# Patient Record
Sex: MALE | Race: WHITE | Employment: OTHER | URBAN - METROPOLITAN AREA
[De-identification: names, ages, dates, MRNs, and addresses within clinical notes are randomized per-mention and may not be internally consistent; named-entity substitution may affect disease eponyms.]

---

## 2019-03-27 ENCOUNTER — HOSPITAL ENCOUNTER (OUTPATIENT)
Dept: SURGERY | Age: 54
Discharge: HOME | End: 2019-03-27
Payer: MEDICARE

## 2019-03-27 ENCOUNTER — ANESTHESIA EVENT (OUTPATIENT)
Dept: SURGERY | Age: 54
DRG: 483 | End: 2019-03-27
Payer: MEDICARE

## 2019-03-27 VITALS
HEART RATE: 88 BPM | BODY MASS INDEX: 29.3 KG/M2 | OXYGEN SATURATION: 95 % | SYSTOLIC BLOOD PRESSURE: 107 MMHG | HEIGHT: 66 IN | WEIGHT: 182.31 LBS | RESPIRATION RATE: 18 BRPM | DIASTOLIC BLOOD PRESSURE: 69 MMHG | TEMPERATURE: 97.6 F

## 2019-03-27 LAB
ANION GAP SERPL CALC-SCNC: 6 MMOL/L (ref 7–16)
BACTERIA SPEC CULT: NORMAL
BUN SERPL-MCNC: 16 MG/DL (ref 6–23)
CALCIUM SERPL-MCNC: 8.5 MG/DL (ref 8.3–10.4)
CHLORIDE SERPL-SCNC: 106 MMOL/L (ref 98–107)
CO2 SERPL-SCNC: 29 MMOL/L (ref 21–32)
CREAT SERPL-MCNC: 0.97 MG/DL (ref 0.8–1.5)
ERYTHROCYTE [DISTWIDTH] IN BLOOD BY AUTOMATED COUNT: 13.4 % (ref 11.9–14.6)
GLUCOSE SERPL-MCNC: 150 MG/DL (ref 65–100)
HCT VFR BLD AUTO: 48.9 % (ref 41.1–50.3)
HGB BLD-MCNC: 16 G/DL (ref 13.6–17.2)
MCH RBC QN AUTO: 31.4 PG (ref 26.1–32.9)
MCHC RBC AUTO-ENTMCNC: 32.7 G/DL (ref 31.4–35)
MCV RBC AUTO: 96.1 FL (ref 79.6–97.8)
NRBC # BLD: 0 K/UL (ref 0–0.2)
PLATELET # BLD AUTO: 229 K/UL (ref 150–450)
PMV BLD AUTO: 10 FL (ref 9.4–12.3)
POTASSIUM SERPL-SCNC: 3.8 MMOL/L (ref 3.5–5.1)
RBC # BLD AUTO: 5.09 M/UL (ref 4.23–5.6)
SERVICE CMNT-IMP: NORMAL
SODIUM SERPL-SCNC: 141 MMOL/L (ref 136–145)
WBC # BLD AUTO: 4.9 K/UL (ref 4.3–11.1)

## 2019-03-27 PROCEDURE — 85027 COMPLETE CBC AUTOMATED: CPT

## 2019-03-27 PROCEDURE — 87641 MR-STAPH DNA AMP PROBE: CPT

## 2019-03-27 PROCEDURE — 80048 BASIC METABOLIC PNL TOTAL CA: CPT

## 2019-03-27 RX ORDER — CETIRIZINE HCL 10 MG
10 TABLET ORAL
COMMUNITY

## 2019-03-27 RX ORDER — LANOLIN ALCOHOL/MO/W.PET/CERES
CREAM (GRAM) TOPICAL
COMMUNITY

## 2019-03-27 RX ORDER — ERGOCALCIFEROL 1.25 MG/1
50000 CAPSULE ORAL
COMMUNITY

## 2019-03-27 NOTE — PERIOP NOTES
Lab results reviewed and acceptable per anesthesia protocol Recent Results (from the past 12 hour(s)) CBC W/O DIFF Collection Time: 03/27/19  2:49 PM  
Result Value Ref Range WBC 4.9 4.3 - 11.1 K/uL  
 RBC 5.09 4.23 - 5.6 M/uL  
 HGB 16.0 13.6 - 17.2 g/dL HCT 48.9 41.1 - 50.3 % MCV 96.1 79.6 - 97.8 FL  
 MCH 31.4 26.1 - 32.9 PG  
 MCHC 32.7 31.4 - 35.0 g/dL  
 RDW 13.4 11.9 - 14.6 % PLATELET 488 164 - 869 K/uL MPV 10.0 9.4 - 12.3 FL ABSOLUTE NRBC 0.00 0.0 - 0.2 K/uL METABOLIC PANEL, BASIC Collection Time: 03/27/19  2:49 PM  
Result Value Ref Range Sodium 141 136 - 145 mmol/L Potassium 3.8 3.5 - 5.1 mmol/L Chloride 106 98 - 107 mmol/L  
 CO2 29 21 - 32 mmol/L Anion gap 6 (L) 7 - 16 mmol/L Glucose 150 (H) 65 - 100 mg/dL BUN 16 6 - 23 MG/DL Creatinine 0.97 0.8 - 1.5 MG/DL  
 GFR est AA >60 >60 ml/min/1.73m2 GFR est non-AA >60 >60 ml/min/1.73m2 Calcium 8.5 8.3 - 10.4 MG/DL  
MSSA/MRSA SC BY PCR, NASAL SWAB Collection Time: 03/27/19  2:49 PM  
Result Value Ref Range Special Requests: NO SPECIAL REQUESTS Culture result:     
  SA target not detected. A MRSA NEGATIVE, SA NEGATIVE test result does not preclude MRSA or SA nasal colonization.

## 2019-03-27 NOTE — PERIOP NOTES
Patient verified name and . Patient provided medical/health information and PTA medications to the best of their ability. TYPE  CASE:lll Order for consent NOT found in EHR and unable to match case posting. Labs per surgeon:none. Labs per anesthesia protocol: CBC, BMP, MRSA, T&S on DOS. EKG  :  10/5/2018 SR with Rt BBB, Has 1 stent placed in , Last heart cath in  no intervention. Cardiac clearance received from Dr. Rima Concepcion on 3/18/2019. Called and spoke with Dr. Garfield Marcos to inform of patient hx and surgery, no need to see patient today. Patient provided with and instructed on education handouts including Guide to Surgery, blood transfusions, pain management, and hand hygiene for the family and community, and [facility] brochure. Antibacterial soap and Hibiclens instructions given per hospital policy. Instructed patient to continue previous medications as prescribed prior to surgery unless otherwise directed and to take the following medications the day of surgery according to anesthesia guidelines : ASA 81mg, Coreg, Breo inhaler, Levothyroxine, Hydrocodone if needed, Protonix, Desvenlafaxine . Instructed patient to hold  the following medications: Celebrex, Vitamins, CBD oil with THC until after surgery. Original medication prescription bottles were visualized during patient appointment. Patient teach back successful and patient demonstrates knowledge of instruction.

## 2019-03-28 NOTE — PERIOP NOTES
Received phone call from patient. He states he does not have a legal healthcare power of  or living will, but does not want his wife (judy [address]) to be designated. Instructed needs to get legal statement, but will document in this note. States he prefers aunt Krishna Leon ([phone]) or Elayne Garvey ([phone]).

## 2019-03-31 PROBLEM — M25.511 RIGHT SHOULDER PAIN: Status: ACTIVE | Noted: 2019-03-31

## 2019-03-31 RX ORDER — CEFAZOLIN SODIUM/WATER 2 G/20 ML
2 SYRINGE (ML) INTRAVENOUS ONCE
Status: CANCELLED | OUTPATIENT
Start: 2019-04-01 | End: 2019-04-01
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
## 2019-04-01 ENCOUNTER — HOSPITAL ENCOUNTER (INPATIENT)
Age: 54
LOS: 2 days | Discharge: HOME HEALTH | DRG: 483 | End: 2019-04-03
Attending: ORTHOPAEDIC SURGERY | Admitting: ORTHOPAEDIC SURGERY
Payer: MEDICARE

## 2019-04-01 ENCOUNTER — APPOINTMENT (OUTPATIENT)
Dept: GENERAL RADIOLOGY | Age: 54
DRG: 483 | End: 2019-04-01
Attending: ORTHOPAEDIC SURGERY
Payer: MEDICARE

## 2019-04-01 ENCOUNTER — ANESTHESIA (OUTPATIENT)
Dept: SURGERY | Age: 54
DRG: 483 | End: 2019-04-01
Payer: MEDICARE

## 2019-04-01 PROBLEM — M87.00 AVASCULAR NECROSIS (HCC): Status: ACTIVE | Noted: 2019-04-01

## 2019-04-01 LAB
ABO + RH BLD: NORMAL
BLOOD GROUP ANTIBODIES SERPL: NORMAL
BLOOD GROUP ANTIBODIES SERPL: NORMAL
SPECIMEN EXP DATE BLD: NORMAL

## 2019-04-01 PROCEDURE — 94760 N-INVAS EAR/PLS OXIMETRY 1: CPT

## 2019-04-01 PROCEDURE — 76010010054 HC POST OP PAIN BLOCK: Performed by: ORTHOPAEDIC SURGERY

## 2019-04-01 PROCEDURE — 77030031139 HC SUT VCRL2 J&J -A: Performed by: ORTHOPAEDIC SURGERY

## 2019-04-01 PROCEDURE — 86870 RBC ANTIBODY IDENTIFICATION: CPT

## 2019-04-01 PROCEDURE — C1776 JOINT DEVICE (IMPLANTABLE): HCPCS | Performed by: ORTHOPAEDIC SURGERY

## 2019-04-01 PROCEDURE — 77030032490 HC SLV COMPR SCD KNE COVD -B: Performed by: ORTHOPAEDIC SURGERY

## 2019-04-01 PROCEDURE — 65270000029 HC RM PRIVATE

## 2019-04-01 PROCEDURE — 77030037088 HC TUBE ENDOTRACH ORAL NSL COVD-A: Performed by: ANESTHESIOLOGY

## 2019-04-01 PROCEDURE — 77030020255 HC SOL INJ LR 1000ML BG

## 2019-04-01 PROCEDURE — 77030008467 HC STPLR SKN COVD -B: Performed by: ORTHOPAEDIC SURGERY

## 2019-04-01 PROCEDURE — 74011250636 HC RX REV CODE- 250/636

## 2019-04-01 PROCEDURE — 74011000250 HC RX REV CODE- 250

## 2019-04-01 PROCEDURE — 77030027138 HC INCENT SPIROMETER -A

## 2019-04-01 PROCEDURE — 77030002922 HC SUT FBRWRE ARTH -B: Performed by: ORTHOPAEDIC SURGERY

## 2019-04-01 PROCEDURE — 76210000006 HC OR PH I REC 0.5 TO 1 HR: Performed by: ORTHOPAEDIC SURGERY

## 2019-04-01 PROCEDURE — 77030018836 HC SOL IRR NACL ICUM -A: Performed by: ORTHOPAEDIC SURGERY

## 2019-04-01 PROCEDURE — 77030012894: Performed by: ORTHOPAEDIC SURGERY

## 2019-04-01 PROCEDURE — 77030039425 HC BLD LARYNG TRULITE DISP TELE -A: Performed by: ANESTHESIOLOGY

## 2019-04-01 PROCEDURE — 73030 X-RAY EXAM OF SHOULDER: CPT

## 2019-04-01 PROCEDURE — 74011250636 HC RX REV CODE- 250/636: Performed by: ORTHOPAEDIC SURGERY

## 2019-04-01 PROCEDURE — 76010000171 HC OR TIME 2 TO 2.5 HR INTENSV-TIER 1: Performed by: ORTHOPAEDIC SURGERY

## 2019-04-01 PROCEDURE — 77030018673: Performed by: ORTHOPAEDIC SURGERY

## 2019-04-01 PROCEDURE — 77030003602 HC NDL NRV BLK BBMI -B: Performed by: ANESTHESIOLOGY

## 2019-04-01 PROCEDURE — 74011250636 HC RX REV CODE- 250/636: Performed by: ANESTHESIOLOGY

## 2019-04-01 PROCEDURE — 77030016570 HC BLNKT BAIR HGGR 3M -B: Performed by: ANESTHESIOLOGY

## 2019-04-01 PROCEDURE — 77030006835 HC BLD SAW SAG STRY -B: Performed by: ORTHOPAEDIC SURGERY

## 2019-04-01 PROCEDURE — 77030002982 HC SUT POLYSRB J&J -A: Performed by: ORTHOPAEDIC SURGERY

## 2019-04-01 PROCEDURE — 76942 ECHO GUIDE FOR BIOPSY: CPT | Performed by: ORTHOPAEDIC SURGERY

## 2019-04-01 PROCEDURE — 86900 BLOOD TYPING SEROLOGIC ABO: CPT

## 2019-04-01 PROCEDURE — L3650 SO 8 ABD RESTRAINT PRE OTS: HCPCS | Performed by: ORTHOPAEDIC SURGERY

## 2019-04-01 PROCEDURE — 74011250637 HC RX REV CODE- 250/637: Performed by: ORTHOPAEDIC SURGERY

## 2019-04-01 PROCEDURE — 77030013727 HC IRR FAN PULSVC ZIMM -B: Performed by: ORTHOPAEDIC SURGERY

## 2019-04-01 PROCEDURE — 76060000035 HC ANESTHESIA 2 TO 2.5 HR: Performed by: ORTHOPAEDIC SURGERY

## 2019-04-01 DEVICE — IMPLANTABLE DEVICE: Type: IMPLANTABLE DEVICE | Site: SHOULDER | Status: FUNCTIONAL

## 2019-04-01 DEVICE — STEM HUM DIA15MM SHLDR PRI PRESSFIT EQUINOXE: Type: IMPLANTABLE DEVICE | Site: SHOULDER | Status: FUNCTIONAL

## 2019-04-01 DEVICE — HEAD HUM DIA47MM SHT SHLDR FOR HEMIARTHROPLASTY EQUINOXE: Type: IMPLANTABLE DEVICE | Site: SHOULDER | Status: FUNCTIONAL

## 2019-04-01 RX ORDER — DICYCLOMINE HYDROCHLORIDE 10 MG/1
10 CAPSULE ORAL 2 TIMES DAILY
Status: DISCONTINUED | OUTPATIENT
Start: 2019-04-01 | End: 2019-04-03 | Stop reason: HOSPADM

## 2019-04-01 RX ORDER — MIDAZOLAM HYDROCHLORIDE 1 MG/ML
2 INJECTION, SOLUTION INTRAMUSCULAR; INTRAVENOUS
Status: COMPLETED | OUTPATIENT
Start: 2019-04-01 | End: 2019-04-01

## 2019-04-01 RX ORDER — PANTOPRAZOLE SODIUM 40 MG/1
40 TABLET, DELAYED RELEASE ORAL
Status: DISCONTINUED | OUTPATIENT
Start: 2019-04-01 | End: 2019-04-03 | Stop reason: HOSPADM

## 2019-04-01 RX ORDER — LIDOCAINE HYDROCHLORIDE 20 MG/ML
INJECTION, SOLUTION EPIDURAL; INFILTRATION; INTRACAUDAL; PERINEURAL AS NEEDED
Status: DISCONTINUED | OUTPATIENT
Start: 2019-04-01 | End: 2019-04-01 | Stop reason: HOSPADM

## 2019-04-01 RX ORDER — CEFAZOLIN SODIUM/WATER 2 G/20 ML
2 SYRINGE (ML) INTRAVENOUS
Status: COMPLETED | OUTPATIENT
Start: 2019-04-01 | End: 2019-04-01

## 2019-04-01 RX ORDER — ROCURONIUM BROMIDE 10 MG/ML
INJECTION, SOLUTION INTRAVENOUS AS NEEDED
Status: DISCONTINUED | OUTPATIENT
Start: 2019-04-01 | End: 2019-04-01 | Stop reason: HOSPADM

## 2019-04-01 RX ORDER — PANTOPRAZOLE SODIUM 40 MG/1
40 TABLET, DELAYED RELEASE ORAL 2 TIMES DAILY
Status: DISCONTINUED | OUTPATIENT
Start: 2019-04-01 | End: 2019-04-01

## 2019-04-01 RX ORDER — ALPRAZOLAM 0.5 MG/1
2 TABLET ORAL
Status: DISCONTINUED | OUTPATIENT
Start: 2019-04-01 | End: 2019-04-01

## 2019-04-01 RX ORDER — LEVOTHYROXINE SODIUM 50 UG/1
50 TABLET ORAL
Status: DISCONTINUED | OUTPATIENT
Start: 2019-04-02 | End: 2019-04-01

## 2019-04-01 RX ORDER — LEVALBUTEROL TARTRATE 45 UG/1
2 AEROSOL, METERED ORAL
Status: DISCONTINUED | OUTPATIENT
Start: 2019-04-01 | End: 2019-04-03 | Stop reason: HOSPADM

## 2019-04-01 RX ORDER — CELECOXIB 200 MG/1
200 CAPSULE ORAL DAILY
Status: DISCONTINUED | OUTPATIENT
Start: 2019-04-02 | End: 2019-04-03 | Stop reason: HOSPADM

## 2019-04-01 RX ORDER — LISINOPRIL 5 MG/1
5 TABLET ORAL DAILY
Status: DISCONTINUED | OUTPATIENT
Start: 2019-04-02 | End: 2019-04-03 | Stop reason: HOSPADM

## 2019-04-01 RX ORDER — HYDROCODONE BITARTRATE AND ACETAMINOPHEN 10; 325 MG/1; MG/1
1 TABLET ORAL
Status: DISCONTINUED | OUTPATIENT
Start: 2019-04-01 | End: 2019-04-02

## 2019-04-01 RX ORDER — SODIUM CHLORIDE 0.9 % (FLUSH) 0.9 %
5-40 SYRINGE (ML) INJECTION EVERY 8 HOURS
Status: DISCONTINUED | OUTPATIENT
Start: 2019-04-01 | End: 2019-04-03 | Stop reason: HOSPADM

## 2019-04-01 RX ORDER — IPRATROPIUM BROMIDE AND ALBUTEROL SULFATE 2.5; .5 MG/3ML; MG/3ML
3 SOLUTION RESPIRATORY (INHALATION)
Status: DISCONTINUED | OUTPATIENT
Start: 2019-04-01 | End: 2019-04-03 | Stop reason: HOSPADM

## 2019-04-01 RX ORDER — ENOXAPARIN SODIUM 100 MG/ML
40 INJECTION SUBCUTANEOUS EVERY 24 HOURS
Status: DISCONTINUED | OUTPATIENT
Start: 2019-04-02 | End: 2019-04-03 | Stop reason: HOSPADM

## 2019-04-01 RX ORDER — HYDROMORPHONE HYDROCHLORIDE 2 MG/ML
0.5 INJECTION, SOLUTION INTRAMUSCULAR; INTRAVENOUS; SUBCUTANEOUS
Status: DISCONTINUED | OUTPATIENT
Start: 2019-04-01 | End: 2019-04-01 | Stop reason: HOSPADM

## 2019-04-01 RX ORDER — GLYCOPYRROLATE 0.2 MG/ML
INJECTION INTRAMUSCULAR; INTRAVENOUS AS NEEDED
Status: DISCONTINUED | OUTPATIENT
Start: 2019-04-01 | End: 2019-04-01 | Stop reason: HOSPADM

## 2019-04-01 RX ORDER — CELECOXIB 200 MG/1
200 CAPSULE ORAL DAILY
Status: DISCONTINUED | OUTPATIENT
Start: 2019-04-02 | End: 2019-04-01

## 2019-04-01 RX ORDER — DEXAMETHASONE SODIUM PHOSPHATE 4 MG/ML
INJECTION, SOLUTION INTRA-ARTICULAR; INTRALESIONAL; INTRAMUSCULAR; INTRAVENOUS; SOFT TISSUE AS NEEDED
Status: DISCONTINUED | OUTPATIENT
Start: 2019-04-01 | End: 2019-04-01 | Stop reason: HOSPADM

## 2019-04-01 RX ORDER — SODIUM CHLORIDE 0.9 % (FLUSH) 0.9 %
5-40 SYRINGE (ML) INJECTION AS NEEDED
Status: DISCONTINUED | OUTPATIENT
Start: 2019-04-01 | End: 2019-04-03 | Stop reason: HOSPADM

## 2019-04-01 RX ORDER — BUPIVACAINE HYDROCHLORIDE 5 MG/ML
INJECTION, SOLUTION EPIDURAL; INTRACAUDAL
Status: COMPLETED | OUTPATIENT
Start: 2019-04-01 | End: 2019-04-01

## 2019-04-01 RX ORDER — NITROGLYCERIN 0.4 MG/1
0.4 TABLET SUBLINGUAL AS NEEDED
Status: DISCONTINUED | OUTPATIENT
Start: 2019-04-01 | End: 2019-04-03 | Stop reason: HOSPADM

## 2019-04-01 RX ORDER — SODIUM CHLORIDE 0.9 % (FLUSH) 0.9 %
5-40 SYRINGE (ML) INJECTION AS NEEDED
Status: DISCONTINUED | OUTPATIENT
Start: 2019-04-01 | End: 2019-04-01 | Stop reason: HOSPADM

## 2019-04-01 RX ORDER — PROPOFOL 10 MG/ML
INJECTION, EMULSION INTRAVENOUS AS NEEDED
Status: DISCONTINUED | OUTPATIENT
Start: 2019-04-01 | End: 2019-04-01 | Stop reason: HOSPADM

## 2019-04-01 RX ORDER — BUPIVACAINE HYDROCHLORIDE AND EPINEPHRINE 2.5; 5 MG/ML; UG/ML
INJECTION, SOLUTION EPIDURAL; INFILTRATION; INTRACAUDAL; PERINEURAL
Status: COMPLETED | OUTPATIENT
Start: 2019-04-01 | End: 2019-04-01

## 2019-04-01 RX ORDER — ASPIRIN 81 MG/1
81 TABLET ORAL DAILY
Status: DISCONTINUED | OUTPATIENT
Start: 2019-04-02 | End: 2019-04-03 | Stop reason: HOSPADM

## 2019-04-01 RX ORDER — FLUTICASONE PROPIONATE 50 MCG
2 SPRAY, SUSPENSION (ML) NASAL DAILY
Status: DISCONTINUED | OUTPATIENT
Start: 2019-04-02 | End: 2019-04-03 | Stop reason: HOSPADM

## 2019-04-01 RX ORDER — VANCOMYCIN HYDROCHLORIDE
1250 ONCE
Status: COMPLETED | OUTPATIENT
Start: 2019-04-01 | End: 2019-04-01

## 2019-04-01 RX ORDER — LIDOCAINE HYDROCHLORIDE 10 MG/ML
0.1 INJECTION INFILTRATION; PERINEURAL AS NEEDED
Status: DISCONTINUED | OUTPATIENT
Start: 2019-04-01 | End: 2019-04-01 | Stop reason: HOSPADM

## 2019-04-01 RX ORDER — SODIUM CHLORIDE, SODIUM LACTATE, POTASSIUM CHLORIDE, CALCIUM CHLORIDE 600; 310; 30; 20 MG/100ML; MG/100ML; MG/100ML; MG/100ML
75 INJECTION, SOLUTION INTRAVENOUS CONTINUOUS
Status: DISCONTINUED | OUTPATIENT
Start: 2019-04-01 | End: 2019-04-01 | Stop reason: HOSPADM

## 2019-04-01 RX ORDER — ALPRAZOLAM 0.5 MG/1
2 TABLET ORAL
Status: DISCONTINUED | OUTPATIENT
Start: 2019-04-01 | End: 2019-04-03 | Stop reason: HOSPADM

## 2019-04-01 RX ORDER — FLUTICASONE PROPIONATE 50 MCG
2 SPRAY, SUSPENSION (ML) NASAL DAILY
Status: DISCONTINUED | OUTPATIENT
Start: 2019-04-02 | End: 2019-04-01

## 2019-04-01 RX ORDER — CARVEDILOL 3.12 MG/1
3.12 TABLET ORAL DAILY
Status: DISCONTINUED | OUTPATIENT
Start: 2019-04-02 | End: 2019-04-03 | Stop reason: HOSPADM

## 2019-04-01 RX ORDER — CEFAZOLIN SODIUM/WATER 2 G/20 ML
2 SYRINGE (ML) INTRAVENOUS EVERY 8 HOURS
Status: COMPLETED | OUTPATIENT
Start: 2019-04-01 | End: 2019-04-02

## 2019-04-01 RX ORDER — LORATADINE 10 MG/1
10 TABLET ORAL DAILY
Status: DISCONTINUED | OUTPATIENT
Start: 2019-04-02 | End: 2019-04-03 | Stop reason: HOSPADM

## 2019-04-01 RX ORDER — LISINOPRIL 5 MG/1
5 TABLET ORAL DAILY
Status: DISCONTINUED | OUTPATIENT
Start: 2019-04-02 | End: 2019-04-01

## 2019-04-01 RX ORDER — DESVENLAFAXINE SUCCINATE 50 MG/1
50 TABLET, EXTENDED RELEASE ORAL DAILY
Status: DISCONTINUED | OUTPATIENT
Start: 2019-04-02 | End: 2019-04-03 | Stop reason: HOSPADM

## 2019-04-01 RX ORDER — DICYCLOMINE HYDROCHLORIDE 10 MG/1
10 CAPSULE ORAL 2 TIMES DAILY
Status: DISCONTINUED | OUTPATIENT
Start: 2019-04-01 | End: 2019-04-01

## 2019-04-01 RX ORDER — CARVEDILOL 3.12 MG/1
3.12 TABLET ORAL 2 TIMES DAILY
Status: DISCONTINUED | OUTPATIENT
Start: 2019-04-01 | End: 2019-04-01

## 2019-04-01 RX ORDER — OXYCODONE AND ACETAMINOPHEN 5; 325 MG/1; MG/1
1 TABLET ORAL AS NEEDED
Status: DISCONTINUED | OUTPATIENT
Start: 2019-04-01 | End: 2019-04-01 | Stop reason: HOSPADM

## 2019-04-01 RX ORDER — TRANEXAMIC ACID 100 MG/ML
INJECTION, SOLUTION INTRAVENOUS AS NEEDED
Status: DISCONTINUED | OUTPATIENT
Start: 2019-04-01 | End: 2019-04-01 | Stop reason: HOSPADM

## 2019-04-01 RX ORDER — ONDANSETRON 2 MG/ML
INJECTION INTRAMUSCULAR; INTRAVENOUS AS NEEDED
Status: DISCONTINUED | OUTPATIENT
Start: 2019-04-01 | End: 2019-04-01 | Stop reason: HOSPADM

## 2019-04-01 RX ORDER — ASPIRIN 81 MG/1
81 TABLET ORAL DAILY
Status: DISCONTINUED | OUTPATIENT
Start: 2019-04-02 | End: 2019-04-01

## 2019-04-01 RX ORDER — SODIUM CHLORIDE 0.9 % (FLUSH) 0.9 %
5-40 SYRINGE (ML) INJECTION EVERY 8 HOURS
Status: DISCONTINUED | OUTPATIENT
Start: 2019-04-01 | End: 2019-04-01 | Stop reason: HOSPADM

## 2019-04-01 RX ORDER — HYDROMORPHONE HYDROCHLORIDE 1 MG/ML
0.5 INJECTION, SOLUTION INTRAMUSCULAR; INTRAVENOUS; SUBCUTANEOUS
Status: DISPENSED | OUTPATIENT
Start: 2019-04-01 | End: 2019-04-02

## 2019-04-01 RX ORDER — EPHEDRINE SULFATE 50 MG/ML
INJECTION, SOLUTION INTRAVENOUS AS NEEDED
Status: DISCONTINUED | OUTPATIENT
Start: 2019-04-01 | End: 2019-04-01 | Stop reason: HOSPADM

## 2019-04-01 RX ORDER — NALOXONE HYDROCHLORIDE 0.4 MG/ML
0.2 INJECTION, SOLUTION INTRAMUSCULAR; INTRAVENOUS; SUBCUTANEOUS AS NEEDED
Status: DISCONTINUED | OUTPATIENT
Start: 2019-04-01 | End: 2019-04-01 | Stop reason: HOSPADM

## 2019-04-01 RX ORDER — LEVOTHYROXINE SODIUM 50 UG/1
50 TABLET ORAL
Status: DISCONTINUED | OUTPATIENT
Start: 2019-04-02 | End: 2019-04-03 | Stop reason: HOSPADM

## 2019-04-01 RX ORDER — NEOSTIGMINE METHYLSULFATE 1 MG/ML
INJECTION, SOLUTION INTRAVENOUS AS NEEDED
Status: DISCONTINUED | OUTPATIENT
Start: 2019-04-01 | End: 2019-04-01 | Stop reason: HOSPADM

## 2019-04-01 RX ADMIN — BUPIVACAINE HYDROCHLORIDE 10 ML: 5 INJECTION, SOLUTION EPIDURAL; INTRACAUDAL at 08:55

## 2019-04-01 RX ADMIN — Medication 5 ML: at 20:36

## 2019-04-01 RX ADMIN — ONDANSETRON 4 MG: 2 INJECTION INTRAMUSCULAR; INTRAVENOUS at 11:40

## 2019-04-01 RX ADMIN — Medication 2 G: at 10:25

## 2019-04-01 RX ADMIN — ALPRAZOLAM 2 MG: 0.5 TABLET ORAL at 17:02

## 2019-04-01 RX ADMIN — PANTOPRAZOLE SODIUM 40 MG: 40 TABLET, DELAYED RELEASE ORAL at 18:34

## 2019-04-01 RX ADMIN — GLYCOPYRROLATE 0.4 MG: 0.2 INJECTION INTRAMUSCULAR; INTRAVENOUS at 11:49

## 2019-04-01 RX ADMIN — Medication 10 ML: at 13:37

## 2019-04-01 RX ADMIN — HYDROMORPHONE HYDROCHLORIDE 0.5 MG: 1 INJECTION, SOLUTION INTRAMUSCULAR; INTRAVENOUS; SUBCUTANEOUS at 20:35

## 2019-04-01 RX ADMIN — LIDOCAINE HYDROCHLORIDE 80 MG: 20 INJECTION, SOLUTION EPIDURAL; INFILTRATION; INTRACAUDAL; PERINEURAL at 10:19

## 2019-04-01 RX ADMIN — EPHEDRINE SULFATE 10 MG: 50 INJECTION, SOLUTION INTRAVENOUS at 10:51

## 2019-04-01 RX ADMIN — SODIUM CHLORIDE, SODIUM LACTATE, POTASSIUM CHLORIDE, AND CALCIUM CHLORIDE 75 ML/HR: 600; 310; 30; 20 INJECTION, SOLUTION INTRAVENOUS at 08:30

## 2019-04-01 RX ADMIN — SODIUM CHLORIDE, SODIUM LACTATE, POTASSIUM CHLORIDE, AND CALCIUM CHLORIDE: 600; 310; 30; 20 INJECTION, SOLUTION INTRAVENOUS at 11:19

## 2019-04-01 RX ADMIN — VANCOMYCIN HYDROCHLORIDE 1250 MG: 10 INJECTION, POWDER, LYOPHILIZED, FOR SOLUTION INTRAVENOUS at 08:30

## 2019-04-01 RX ADMIN — TRANEXAMIC ACID 2 G: 100 INJECTION, SOLUTION INTRAVENOUS at 10:51

## 2019-04-01 RX ADMIN — PROPOFOL 200 MG: 10 INJECTION, EMULSION INTRAVENOUS at 10:19

## 2019-04-01 RX ADMIN — NEOSTIGMINE METHYLSULFATE 3 MG: 1 INJECTION, SOLUTION INTRAVENOUS at 11:49

## 2019-04-01 RX ADMIN — Medication 2 G: at 17:03

## 2019-04-01 RX ADMIN — MIDAZOLAM HYDROCHLORIDE 2 MG: 2 INJECTION, SOLUTION INTRAMUSCULAR; INTRAVENOUS at 09:37

## 2019-04-01 RX ADMIN — MIDAZOLAM HYDROCHLORIDE 2 MG: 2 INJECTION, SOLUTION INTRAMUSCULAR; INTRAVENOUS at 08:50

## 2019-04-01 RX ADMIN — DEXAMETHASONE SODIUM PHOSPHATE 4 MG: 4 INJECTION, SOLUTION INTRA-ARTICULAR; INTRALESIONAL; INTRAMUSCULAR; INTRAVENOUS; SOFT TISSUE at 11:20

## 2019-04-01 RX ADMIN — ROCURONIUM BROMIDE 50 MG: 10 INJECTION, SOLUTION INTRAVENOUS at 10:19

## 2019-04-01 RX ADMIN — TRANEXAMIC ACID 1 G: 100 INJECTION, SOLUTION INTRAVENOUS at 11:43

## 2019-04-01 RX ADMIN — ALPRAZOLAM 2 MG: 0.5 TABLET ORAL at 23:41

## 2019-04-01 RX ADMIN — DICYCLOMINE HYDROCHLORIDE 10 MG: 10 CAPSULE ORAL at 18:33

## 2019-04-01 RX ADMIN — EPHEDRINE SULFATE 10 MG: 50 INJECTION, SOLUTION INTRAVENOUS at 10:35

## 2019-04-01 RX ADMIN — BUPIVACAINE HYDROCHLORIDE AND EPINEPHRINE 10 ML: 2.5; 5 INJECTION, SOLUTION EPIDURAL; INFILTRATION; INTRACAUDAL; PERINEURAL at 08:55

## 2019-04-01 RX ADMIN — Medication 1 AMPULE: at 20:35

## 2019-04-01 NOTE — PERIOP NOTES
Spoke with JEOVANNY IBANEZ [facility] in respiratory. Bipap to be needed in PACU after surgery. Verbalized understanding

## 2019-04-01 NOTE — H&P
History and Physical Updated with no interval change. Igor Elaine MD History and Physical Updated with no interval change.  Igor Elaine MD

## 2019-04-01 NOTE — PERIOP NOTES
Pt complains of shortness of breath. Sats 98%. Gave his Albuterol inhaler to take. 2 puffs taken. Dr. Heena Hodge aware and stated pt could have 2 more mg of Versed.

## 2019-04-01 NOTE — BRIEF OP NOTE
BRIEF OPERATIVE NOTE Date of Procedure: 4/1/2019 Preoperative Diagnosis: Osteonecrosis ([address] [M87.9] Postoperative Diagnosis: Osteonecrosis ([address] [M87.9] Procedure(s): RIGHT SHOULDER ARTHROPLASTY/TOTAL MORENO/NEPHEW  /CHOICE ANES Surgeon(s) and Role: 
   Monalisa Estrada MD - Primary Surgical Assistant: Dr. Gabino Fang Surgical Staff: 
Circ-1: Margarita Rivers RN 
Circ-Relief: Dalton Ware RN; Leland Agarwal RN Scrub Tech-1: TobyJose Cruz bullock Scrub Tech-2: Corrie Powell Event Time In Time Out Incision Start 1051 Incision Close Anesthesia: General  
Estimated Blood Loss: 200 ml Specimens: * No specimens in log * Findings:  As above Complications: none noted Implants:  
Implant Name Type Inv. Item Serial No.  Lot No. LRB No. Used Action STEM HUM PF PRIMARY 15. 0MM -- Tyler Patel - [id number]  STEM HUM PF PRIMARY 15. 0MM -- Tyler Patel [id number] [employer]  Right 1 Implanted CAGE Edra Sicard MED --  - [id number]  CAGE Edra Sicard MED --  [id number] [address]  Right 1 Implanted CAGE Edra Sicard MED --  - [id number]  CAGE Edra Sicard MED --   [address] [id number] Right 1 Implanted HEAD HUM SHORT BETA 47MM Formerly [facility]  HEAD HUM SHORT BETA 47MM [employer] [id number] Right 1 Implanted

## 2019-04-01 NOTE — ANESTHESIA POSTPROCEDURE EVALUATION
Procedure(s): RIGHT SHOULDER ARTHROPLASTY/TOTAL SMITH/NEPHEW  /CHOICE ANES. general 
 
Anesthesia Post Evaluation Multimodal analgesia: multimodal analgesia used between 6 hours prior to anesthesia start to PACU discharge Patient location during evaluation: PACU Patient participation: complete - patient participated Level of consciousness: awake and alert Pain management: adequate Airway patency: patent Anesthetic complications: no 
Cardiovascular status: acceptable Respiratory status: acceptable Hydration status: acceptable Post anesthesia nausea and vomiting:  none Vitals Value Taken Time /86 4/1/2019  1:08 PM  
Temp 36.2 °C (97.2 °F) 4/1/2019 12:50 PM  
Pulse 66 4/1/2019  1:16 PM  
Resp 21 4/1/2019  1:16 PM  
SpO2 97 % 4/1/2019  1:16 PM  
Vitals shown include unvalidated device data.

## 2019-04-01 NOTE — ANESTHESIA PREPROCEDURE EVALUATION
Anesthetic History Review of Systems / Medical History Patient summary reviewed and pertinent labs reviewed Pulmonary COPD Sleep apnea: CPAP Asthma Neuro/Psych  
 
 
 
TIA Comments: Myopathy Fibromyalgia PTSD Cardiovascular Hypertension CHF Past MI, CAD, cardiac stents (BRANDON 5-13) and hyperlipidemia Exercise tolerance: >4 METS Comments: Stopped Plavix in 2014, still takes 81 mg ASA  
GI/Hepatic/Renal 
  
GERD: well controlled Endo/Other Hypothyroidism Arthritis Other Findings Physical Exam 
 
Airway Mallampati: II 
TM Distance: 4 - 6 cm Neck ROM: normal range of motion Mouth opening: Diminished (comment) Cardiovascular Regular rate and rhythm,  S1 and S2 normal,  no murmur, click, rub, or gallop Rhythm: regular Dental 
 
Dentition: Caps/crowns Pulmonary Breath sounds clear to auscultation Abdominal 
 
 
 
 Other Findings Anesthetic Plan ASA: 3 Anesthesia type: general 
 
 
Post-op pain plan if not by surgeon: peripheral nerve block single Anesthetic plan and risks discussed with: Patient

## 2019-04-01 NOTE — ANESTHESIA PROCEDURE NOTES
Right Interscalene Block    Start time: 4/1/2019 8:50 AM  End time: 4/1/2019 8:55 AM  Performed by: Mahesh Howard MD  Authorized by: Mahesh Howard MD       Pre-procedure: Indications: at surgeon's request and post-op pain management    Preanesthetic Checklist: patient identified, risks and benefits discussed, site marked, timeout performed, anesthesia consent given and patient being monitored    Timeout Time: 08:50          Block Type:   Block Type:   Interscalene  Laterality:  Right  Monitoring:  Standard ASA monitoring, responsive to questions, oxygen, continuous pulse ox, frequent vital sign checks and heart rate  Injection Technique:  Single shot  Procedures: ultrasound guided    Patient Position: seated  Prep: chlorhexidine    Location:  Interscalene  Needle Type:  Stimuplex  Needle Gauge:  22 G  Needle Localization:  Ultrasound guidance    Assessment:  Number of attempts:  1  Injection Assessment:  Incremental injection every 5 mL, negative aspiration for CSF, no paresthesia, ultrasound image on chart, no intravascular symptoms, negative aspiration for blood and local visualized surrounding nerve on ultrasound  Patient tolerance:  Patient tolerated the procedure well with no immediate complications  The relevant block area was scanned by ultrasound before, during, and after the local anesthetic injection and no gross abnormalities were noted

## 2019-04-01 NOTE — PERIOP NOTES
TRANSFER - OUT REPORT: 
 
Verbal report given to Pradip Escamilla RN(name) on Lito Alcaraz  being transferred to 725(unit) for routine post - op Report consisted of patients Situation, Background, Assessment and  
Recommendations(SBAR). Information from the following report(s) Kardex, OR Summary, Procedure Summary, Intake/Output, MAR and Cardiac Rhythm SR was reviewed with the receiving nurse. Lines:  
Peripheral IV 04/01/19 Left Hand (Active) Site Assessment Clean, dry, & intact 4/1/2019 12:50 PM  
Phlebitis Assessment 0 4/1/2019 12:50 PM  
Infiltration Assessment 0 4/1/2019 12:50 PM  
Dressing Status Clean, dry, & intact 4/1/2019 12:50 PM  
Dressing Type Tape;Transparent 4/1/2019 12:50 PM  
Hub Color/Line Status Capped;Pink 4/1/2019 12:50 PM  
Alcohol Cap Used No 4/1/2019 12:23 PM  
  
 
Opportunity for questions and clarification was provided. Patient transported with: 
 O2 @ 2 liters VTE prophylaxis orders have been written for Lito Alcaraz. Patient and family given floor number and nurses name. Family updated re: pt status after security code verified.

## 2019-04-02 LAB
HCT VFR BLD AUTO: 48.7 % (ref 41.1–50.3)
HGB BLD-MCNC: 15.9 G/DL (ref 13.6–17.2)

## 2019-04-02 PROCEDURE — 65270000029 HC RM PRIVATE

## 2019-04-02 PROCEDURE — 74011250637 HC RX REV CODE- 250/637: Performed by: ORTHOPAEDIC SURGERY

## 2019-04-02 PROCEDURE — 74011250636 HC RX REV CODE- 250/636: Performed by: ORTHOPAEDIC SURGERY

## 2019-04-02 PROCEDURE — 36415 COLL VENOUS BLD VENIPUNCTURE: CPT

## 2019-04-02 PROCEDURE — 85018 HEMOGLOBIN: CPT

## 2019-04-02 PROCEDURE — 74011250637 HC RX REV CODE- 250/637: Performed by: NURSE PRACTITIONER

## 2019-04-02 PROCEDURE — 94760 N-INVAS EAR/PLS OXIMETRY 1: CPT

## 2019-04-02 RX ORDER — HYDROCODONE BITARTRATE AND ACETAMINOPHEN 10; 325 MG/1; MG/1
1 TABLET ORAL
Status: DISCONTINUED | OUTPATIENT
Start: 2019-04-02 | End: 2019-04-03 | Stop reason: HOSPADM

## 2019-04-02 RX ADMIN — Medication 5 ML: at 17:49

## 2019-04-02 RX ADMIN — DESVENLAFAXINE SUCCINATE 50 MG: 50 TABLET, EXTENDED RELEASE ORAL at 09:00

## 2019-04-02 RX ADMIN — Medication 1 AMPULE: at 09:35

## 2019-04-02 RX ADMIN — Medication 2 G: at 01:51

## 2019-04-02 RX ADMIN — ENOXAPARIN SODIUM 40 MG: 40 INJECTION SUBCUTANEOUS at 09:36

## 2019-04-02 RX ADMIN — LISINOPRIL 5 MG: 5 TABLET ORAL at 11:32

## 2019-04-02 RX ADMIN — LORATADINE 10 MG: 10 TABLET ORAL at 09:37

## 2019-04-02 RX ADMIN — Medication 2 SPRAY: at 11:28

## 2019-04-02 RX ADMIN — PANTOPRAZOLE SODIUM 40 MG: 40 TABLET, DELAYED RELEASE ORAL at 05:22

## 2019-04-02 RX ADMIN — CELECOXIB 200 MG: 200 CAPSULE ORAL at 11:29

## 2019-04-02 RX ADMIN — ALPRAZOLAM 2 MG: 0.5 TABLET ORAL at 09:35

## 2019-04-02 RX ADMIN — HYDROMORPHONE HYDROCHLORIDE 0.5 MG: 1 INJECTION, SOLUTION INTRAMUSCULAR; INTRAVENOUS; SUBCUTANEOUS at 09:45

## 2019-04-02 RX ADMIN — LEVOTHYROXINE SODIUM 50 MCG: 50 TABLET ORAL at 05:22

## 2019-04-02 RX ADMIN — DICYCLOMINE HYDROCHLORIDE 10 MG: 10 CAPSULE ORAL at 17:47

## 2019-04-02 RX ADMIN — PANTOPRAZOLE SODIUM 40 MG: 40 TABLET, DELAYED RELEASE ORAL at 17:48

## 2019-04-02 RX ADMIN — Medication 1 AMPULE: at 20:46

## 2019-04-02 RX ADMIN — DICYCLOMINE HYDROCHLORIDE 10 MG: 10 CAPSULE ORAL at 11:26

## 2019-04-02 RX ADMIN — HYDROMORPHONE HYDROCHLORIDE 0.5 MG: 1 INJECTION, SOLUTION INTRAMUSCULAR; INTRAVENOUS; SUBCUTANEOUS at 01:51

## 2019-04-02 RX ADMIN — Medication 5 ML: at 20:47

## 2019-04-02 RX ADMIN — HYDROCODONE BITARTRATE AND ACETAMINOPHEN 1 TABLET: 10; 325 TABLET ORAL at 05:22

## 2019-04-02 RX ADMIN — HYDROCODONE BITARTRATE AND ACETAMINOPHEN 1 TABLET: 10; 325 TABLET ORAL at 20:46

## 2019-04-02 RX ADMIN — ASPIRIN 81 MG: 81 TABLET, COATED ORAL at 09:00

## 2019-04-02 RX ADMIN — Medication 2 G: at 11:28

## 2019-04-02 RX ADMIN — CARVEDILOL 3.12 MG: 3.12 TABLET ORAL at 11:26

## 2019-04-02 RX ADMIN — Medication 5 ML: at 05:22

## 2019-04-02 NOTE — PROGRESS NOTES
04/01/19 1327 Dual Skin Pressure Injury Assessment Dual Skin Pressure Injury Assessment WDL Second Care Provider (Based on [address] [place] [address]) [facility], RN Skin Integumentary Skin Integumentary (WDL) X Skin Condition/Temp Dry; Warm  
Skin Color Appropriate for ethnicity;Pink (Blanchable pinkness: Buttocks) Skin Integrity Incision (comment); Scars (comment) (Incision: RUE) Turgor Non-tenting Wound Prevention and Protection Methods Orientation of Wound Prevention Posterior Location of Wound Prevention Sacrum/Coccyx Dressing Present  No  
Wound Offloading (Prevention Methods) Bed, pressure redistribution/air;Bed, pressure reduction mattress;Pillows;Repositioning;Turning

## 2019-04-02 NOTE — PROGRESS NOTES
Orthopedic Joint Progress Note 2019 Admit Date: 2019 Admit Diagnosis: [address]onecrosis ([facility] [address] [facility] [M87.9] Avascular necrosis ([facility] [address] [M87.00] 1 Day Post-Op Subjective:  
 
Jovani [place] alert oriented and doing well with some anxiety(baseline) Review of Systems: Pertinent items are noted in HPI. Objective:  
 
PT/OT:  
 
PATIENT MOBILITY Vital Signs:   
Blood pressure 123/72, pulse 93, temperature 98.2 °F (36.8 °C), resp. rate 19, height 5' 6\" (1.676 m), weight 81.1 kg (178 lb 11.2 oz), SpO2 94 %. Temp (24hrs), Av.8 °F (36.6 °C), Min:97.2 °F (36.2 °C), Max:98.2 °F (36.8 °C) Pain Control:  
Pain Assessment Pain Scale 1: Numeric (0 - 10) Pain Intensity 1: 10 
Pain Onset 1: post op Pain Location 1: Shoulder Pain Orientation 1: Right Pain Description 1: Throbbing Pain Intervention(s) 1: Cold pack Meds: 
Current Facility-Administered Medications Medication Dose Route Frequency  HYDROcodone-acetaminophen (NORCO)  mg tablet 1 Tab  1 Tab Oral Q6H PRN  
 albuterol-ipratropium (DUO-NEB) 2.5 MG-0.5 MG/3 ML  3 mL Nebulization Q6H PRN  
 loratadine (CLARITIN) tablet 10 mg  10 mg Oral DAILY  desvenlafaxine succinate (PRISTIQ) ER tablet 50 mg  (Patient Supplied)  50 mg Oral DAILY  levalbuterol tartrate (XOPENEX) 45 mcg/actuation inhaler HFAA 2 Puff (Patient Supplied)  2 Puff Inhalation Q4H PRN  
 metroNIDAZOLE (NORITATE) 1 % cream (Patient Supplied)   Topical BID WITH MEALS  nitroglycerin (NITROSTAT) tablet 0.4 mg  0.4 mg SubLINGual PRN  
 sodium chloride (NS) flush 5-40 mL  5-40 mL IntraVENous Q8H  
 sodium chloride (NS) flush 5-40 mL  5-40 mL IntraVENous PRN  
 HYDROmorphone (PF) (DILAUDID) injection 0.5 mg  0.5 mg IntraVENous Q3H PRN  
 enoxaparin (LOVENOX) injection 40 mg  40 mg SubCUTAneous Q24H  
 alcohol 62% (NOZIN) nasal  1 Ampule  1 Ampule Topical Q12H  aspirin delayed-release tablet 81 mg  81 mg Oral DAILY  carvedilol (COREG) tablet 3.125 mg (Patient Supplied)  3.125 mg Oral DAILY  celecoxib (CELEBREX) capsule 200 mg (Patient Supplied)  200 mg Oral DAILY  dicyclomine (BENTYL) capsule 10 mg  (Patient Supplied)  10 mg Oral BID  fluticasone propionate (FLONASE) 50 mcg/actuation nasal spray 2 Spray  (Patient Supplied)  2 [place] Both Nostrils DAILY  levothyroxine (SYNTHROID) tablet 50 mcg  (Patient Supplied)  50 mcg Oral ACB  ALPRAZolam (XANAX) tablet 2 mg  2 mg Oral QID PRN  
 lisinopril (PRINIVIL, ZESTRIL) tablet 5 mg (Patient Supplied)  5 mg Oral DAILY  pantoprazole (PROTONIX) tablet 40 mg (Patient Supplied)  40 mg Oral ACB&D  
 tapentadol Tb12 200 mg (Patient Supplied)  200 mg Oral BID  
  
 
LAB:   
Lab Results Component Value Date/Time INR 0.9 03/25/2015 03:20 PM  
 INR 1.0 06/21/2013 12:16 PM  
 
Lab Results Component Value Date/Time HGB 15.9 04/02/2019 05:35 AM  
 HGB 16.0 03/27/2019 02:49 PM  
 HGB 15.1 06/06/2016 05:40 PM  
 
 
Wound Shoulder Right (Active) Dressing Status Clean, dry, and intact 4/1/2019  7:34 PM  
Dressing Type ABD pad;Special tape (comment) 4/1/2019  7:34 PM  
Splint Type/Material Shoulder Immobilizer 4/1/2019  7:34 PM  
Drainage Amount None 4/1/2019  1:27 PM  
Wound Odor None 4/1/2019  1:27 PM  
Number of days: 1 Physical Exam: No significant changes, NVI with great  and wrist function. No drainage. Assessment:  
  
Principal Problem: 
  Right shoulder pain (3/31/2019) Active Problems: 
  Avascular necrosis ([address] (4/1/2019) Plan:  
 
Continue PT/OT/Rehab Consult: Rehab team including PT, OT, recreational therapy, and  Patient Expects to be Discharged to[de-identified] Private residence Signed By: Juanna Felty, MD

## 2019-04-02 NOTE — PROGRESS NOTES
Interdisciplinary team rounds were held 4/2/2019 with the following team members:Care Management, Physical Therapy and . Anticipate discharge home tomorrow. Plan of care discussed. See clinical pathway and/or care plan for interventions and desired outcomes.

## 2019-04-02 NOTE — PROGRESS NOTES
Problem: Pain Goal: *Control of Pain Outcome: Progressing Towards Goal 
  
Problem: Patient Education: Go to Patient Education Activity Goal: Patient/Family Education Outcome: Progressing Towards Goal

## 2019-04-02 NOTE — PROGRESS NOTES
Pt only has IV dilaudid for pain. Jcarlos Grant NP to clarify orders for pain. Orders received for pt's PTA dose of Norco  mg q6h PRN for moderate pain.

## 2019-04-02 NOTE — PROGRESS NOTES
Nutrition Reason for assessment: Referral received from nursing admission Malnutrition Screening Tool Recently Lost Weight Without Trying: Yes If Yes, How Much Weight Loss: 14 - 23 lbs(20 lbs) Eating Poorly Due to Decreased Appetite: Yes Assessment:  
Diet: DIET REGULAR 
DIET NUTRITIONAL SUPPLEMENTS All Meals; [employer] PMH: CHF, COPD, MI Food/Nutrition Patient History:  The patient is currently admitted for right shoulder pain and underwent right shoulder arthroplasty yesterday. He reports that he has lost about 20#. He attributes the weight loss to \"issues with my wife and the shoulder pain being unbearable at times. \" He states that he ate really well at breakfast this morning, however he could not eat lunch due to his intense shoulder pain from the doctor taking him off \"the good stuff. \" He was very fidgety and asked me to get his nurse multiple times for pain meds. The patient has no questions or concerns about his nutrition at this time. He would like to continue to receive Ensure supplements. Weight history in the EMR cannot be verified as accurate due to unknown weight source (pt stated vs estimated vs measured). Weight Loss Metrics 4/1/2019 3/27/2019 10/5/2018 Today's Wt 178 lb 11.2 oz 182 lb 5 oz 194 lb BMI 28.84 kg/m2 29.43 kg/m2 31.31 kg/m2 According to the EMR the patient has potentially lost ~16 lbs over the past ~6 months. This is a clinically insignificant weight loss of 8.2% over the past ~6 months. Anthropometrics:Height: 5' 6\" (167.6 cm),  Weight: 81.1 kg (178 lb 11.2 oz), Weight Source: Standing scale (comment), Body mass index is 28.84 kg/m². BMI class of overweight. Macronutrient needs: EER:  3357-2376 kcal /day (20-25 kcal/kg listed BW of 81.1 kg) EPR:  81-97 grams protein/day (1-1.2 grams/kg listed BW) Intake/Comparative Standards: Too be determined-no po intakes have been recorded at this time.   
 
Nutrition Diagnosis: Unintended weight loss related to social issues/martial strife as evidenced by patient with 16 lb weight loss over the past 6 months. Intervention: 
Meals and snacks: Continue current diet. Nutrition Supplement Therapy: Ensure Enlive TID Nutrition Discharge Plan: Too soon to be determined. Lazarus Dad Edmundo Gilles, [address], [address],  [phone]

## 2019-04-03 VITALS
WEIGHT: 178.1 LBS | HEART RATE: 96 BPM | HEIGHT: 66 IN | SYSTOLIC BLOOD PRESSURE: 103 MMHG | TEMPERATURE: 97.3 F | OXYGEN SATURATION: 96 % | DIASTOLIC BLOOD PRESSURE: 66 MMHG | RESPIRATION RATE: 17 BRPM | BODY MASS INDEX: 28.62 KG/M2

## 2019-04-03 PROCEDURE — 74011250637 HC RX REV CODE- 250/637: Performed by: ORTHOPAEDIC SURGERY

## 2019-04-03 PROCEDURE — 0RRJ0JZ REPLACEMENT OF RIGHT SHOULDER JOINT WITH SYNTHETIC SUBSTITUTE, OPEN APPROACH: ICD-10-PCS | Performed by: ORTHOPAEDIC SURGERY

## 2019-04-03 PROCEDURE — 74011250636 HC RX REV CODE- 250/636: Performed by: ORTHOPAEDIC SURGERY

## 2019-04-03 PROCEDURE — 97161 PT EVAL LOW COMPLEX 20 MIN: CPT

## 2019-04-03 PROCEDURE — 97110 THERAPEUTIC EXERCISES: CPT

## 2019-04-03 PROCEDURE — 97530 THERAPEUTIC ACTIVITIES: CPT

## 2019-04-03 PROCEDURE — 74011250637 HC RX REV CODE- 250/637: Performed by: NURSE PRACTITIONER

## 2019-04-03 RX ADMIN — LORATADINE 10 MG: 10 TABLET ORAL at 10:02

## 2019-04-03 RX ADMIN — Medication 5 ML: at 05:37

## 2019-04-03 RX ADMIN — DESVENLAFAXINE SUCCINATE 50 MG: 50 TABLET, EXTENDED RELEASE ORAL at 10:20

## 2019-04-03 RX ADMIN — PANTOPRAZOLE SODIUM 40 MG: 40 TABLET, DELAYED RELEASE ORAL at 05:36

## 2019-04-03 RX ADMIN — DICYCLOMINE HYDROCHLORIDE 10 MG: 10 CAPSULE ORAL at 10:19

## 2019-04-03 RX ADMIN — Medication 2 SPRAY: at 10:20

## 2019-04-03 RX ADMIN — LISINOPRIL 5 MG: 5 TABLET ORAL at 10:20

## 2019-04-03 RX ADMIN — CELECOXIB 200 MG: 200 CAPSULE ORAL at 10:18

## 2019-04-03 RX ADMIN — ASPIRIN 81 MG: 81 TABLET, COATED ORAL at 10:03

## 2019-04-03 RX ADMIN — CARVEDILOL 3.12 MG: 3.12 TABLET ORAL at 10:19

## 2019-04-03 RX ADMIN — ENOXAPARIN SODIUM 40 MG: 40 INJECTION SUBCUTANEOUS at 10:03

## 2019-04-03 RX ADMIN — HYDROCODONE BITARTRATE AND ACETAMINOPHEN 1 TABLET: 10; 325 TABLET ORAL at 10:16

## 2019-04-03 RX ADMIN — LEVOTHYROXINE SODIUM 50 MCG: 50 TABLET ORAL at 05:36

## 2019-04-03 RX ADMIN — Medication 1 AMPULE: at 10:02

## 2019-04-03 NOTE — PROGRESS NOTES
Pt's D/C instructions completed. Verbalized understanding of all instructions including diet, activity, s/sx to alert MD, medications, wound care, and f/u appointment. Family at [facility].

## 2019-04-03 NOTE — DISCHARGE SUMMARY
Total Joint Discharge Summary    Patient: Aleksandra Lee MRN: [id number]  SSN: [id number]    YOB: 1965  Age: 52 y.o. Sex: male      Admit Date: 4/1/2019  Discharge Date:4/3/2019  Admitting Physician: Connor Coto MD   Discharge Physician: Connor Coto MD   Admission Diagnoses: Osteonecrosis [facility]) [M87.9]  Avascular necrosis [facility]) [M87.00]   Discharge Diagnoses:   Patient Active Problem List   Diagnosis Code    MRSA (methicillin resistant staph aureus) culture positive Z22.322    AVN of femur ([facility] M87.059    Chronic anxiety F41.9    Chest pain R07.9    Chronic systolic heart failure ([facility] I50.22    Acute myocardial infarction ([facility] [address] I21.9    Hyperlipidemia E78.5    ASCAD-of the Native Vessel I25.10    Right shoulder pain M25.511    Avascular necrosis ([facility] [address] M87.00     Surgeon: Surgeon(s):  Bettina Mcneill MD   DVT Prophylaxis:Lovenox                                  ASA  Postoperative Complications: none detected  Last Hemoglobin:   Lab Results   Component Value Date/Time    HGB 15.9 04/02/2019 05:35 AM        Wound appears to be healing without any evidence of infection. Physical Therapy started on the day following surgery and progressed to independent ambulation with the aid of a walker. At the time of discharge, patient is able to go up and down stairs and has understanding of precautions needed following surgery. Discharged Disposition: Home    Discharge Instructions:   Anticoagulate with Ecotrin 325 mg orally once a day for 4 weeks   Resume pre-hospital diet             Resume home medications per medical continuation form      Ambulate with walker, appropriate total joint protocol   Follow up in office as scheduled    Call doctor immediately if temperature is greater [id number]°F, increased pain, swelling, drainage.    If shortness of breath or chest pain, immediately go to the Emergency Department    Signed By: Connor Coto MD     April 3, 2019

## 2019-04-03 NOTE — PROGRESS NOTES
Problem: Mobility Impaired (Adult and Pediatric) Goal: *Acute Goals and Plan of Care (Insert Text) Description Discharge Goals: 
(1.)Mr. La Edwards will demonstrate independence with HEP within 7 treatment day(s). (2.)Mr. La Edwards will ambulate with INDEPENDENT for 500+ feet with the least restrictive device within 7 treatment day(s). ________________________________________________________________________________________________ Outcome: Progressing Towards Goal 
 
 
PHYSICAL THERAPY: Initial Assessment and PM 4/3/2019 INPATIENT: PT Visit Days : 1 Payor: Jean Paul Madrigal / Plan: [address] / Product Type: Managed Care Medicare /   
  
NAME/AGE/GENDER: Joshua Vance is a 52 y.o. male PRIMARY DIAGNOSIS: Osteonecrosis ([facility] [address] [M87.9] Avascular necrosis ([facility]) [M87.00] Right shoulder pain Right shoulder pain Procedure(s) (LRB): 
RIGHT SHOULDER ARTHROPLASTY/TOTAL MORENO/NEPHEW  /CHOICE ANES (Right) 2 Days Post-Op ICD-10: Treatment Diagnosis:  
 Generalized Muscle Weakness (M62.81) Precaution/Allergies: 
Levaquin [levofloxacin]; Lexapro [escitalopram]; Statins-hmg-coa reductase inhibitors; and Sulfa (sulfonamide antibiotics) NWB R UE 
  
ASSESSMENT:  
 
Mr. La Edwards is sitting EOB upon contact and agreeable to PT evaluation and treatment this afternoon. Pt has list of questions for PT which PT addressed. Pt is independent with gait and ADLs at baseline and drives. Pt is now 2 days post op R TSA. Reviewed NWB R UE and adjusted sling for improved shoulder positioning. Reviwed HEP and pt performed 10 repetitions of each. Pt requiring much reassurance and education. Pt performed bed mobility independently and STS independently. Pt ambulated 500 ft in hallway with SBA with increased trunk sway noted but no LOB noted. Pt returned to room and left sitting EOB with all needs met and within reach. Moe Rico will benefit from skilled PT (medically necessary) to address decreased strength, decreased balance, decreased functional tolerance affecting participation in basic ADLs and functional tasks. This section established at most recent assessment PROBLEM LIST (Impairments causing functional limitations): 
Decreased Strength Decreased ADL/Functional Activities Decreased Transfer Abilities Decreased Ambulation Ability/Technique Decreased Balance Increased Pain Decreased Flexibility/Joint Mobility Decreased Knowledge of Precautions Decreased [place] with Home Exercise Program 
 INTERVENTIONS PLANNED: (Benefits and precautions of physical therapy have been discussed with the patient.) Balance Exercise Bed Mobility Family Education Gait Training Home Exercise Program (HEP) Neuromuscular Re-education/Strengthening Range of Motion (ROM) Therapeutic Activites Therapeutic Exercise/Strengthening Transfer Training TREATMENT PLAN: Frequency/Duration: daily for duration of hospital stay Rehabilitation Potential For Stated Goals: Excellent RECOMMENDED REHABILITATION/EQUIPMENT: (at time of discharge pending progress): Due to the probability of continued deficits (see above) this patient will likely need continued skilled physical therapy after discharge. Equipment:  
None at this time HISTORY:  
History of Present Injury/Illness (Reason for Referral): S/p R TSA Past Medical History/Comorbidities:  
Mr. Donna Macias  has a past medical history of Abnormal liver function, cause undetermined, Acute myocardial infarction ([address] (4/29/2016), Anxiety, Arthritis, ASCAD-of the Native Vessel (4/29/2016), Asthma, Autoimmune disease ([address] AVN of femur ([address] (4/18/2016), Bulging lumbar disc, CAD (coronary artery disease) (5/16/2013), Chest pain (4/29/2016), Chronic anxiety (4/29/2016), Chronic obstructive pulmonary disease ([address] Chronic pain, Chronic systolic heart failure ([facility] (4/29/2016), Claustrophobia, Congestive heart failure ([facility] [address] Depression, Extradural cyst of spine, GERD (gastroesophageal reflux disease), Heart failure ([facility] [address] History of rectal bleeding, Hyperlipidemia, Hypertension, MRSA (methicillin resistant staph aureus) culture positive (4/18/2016), Old MI (myocardial infarction), PTSD (post-traumatic stress disorder), Right BB block, Stroke ([facility] [address] (1991), Thyroid disease, and Unspecified sleep apnea. Mr. Jeremy Shields  has a past surgical history that includes hx hernia repair; hx colonoscopy; hx endoscopy; pr sinus surgery proc unlisted; pr left heart cath,percutaneous (5/16/2013); hx heart catheterization (06/09/2016); hx shoulder arthroscopy (Left); and hx orthopaedic (Right). Social History/Living Environment:  
Home Environment: Private residence # Steps to Enter: 2 One/Two Story Residence: Two story, live on 1st floor # of Interior Steps: 0 Lift Chair Available: No 
Living Alone: No 
Support Systems: Spouse/Significant Other/Partner, Family member(s) Patient Expects to be Discharged to[de-identified] Private residence Current DME Used/Available at Home: None Prior Level of Function/Work/Activity: 
Independent with all mobility Number of Personal Factors/Comorbidities that affect the Plan of Care: 3+: HIGH COMPLEXITY EXAMINATION:  
Most Recent Physical Functioning:  
Gross Assessment: 
Strength: Within functional limits Coordination: Within functional limits Posture: 
  
Balance: 
Sitting: Intact; Without support Standing: Intact; Without support Bed Mobility: 
Supine to Sit: Independent Sit to Supine: Independent Wheelchair Mobility: 
  
Transfers: 
Sit to Stand: Independent Stand to Sit: Independent Gait: 
  
Step Length: Left shortened;Right shortened Gait Abnormalities: Decreased step clearance Distance (ft): 500 Feet (ft) Assistive Device: (none) Ambulation - Level of Assistance: Stand-by assistance Interventions: Safety awareness training Body Structures Involved: 
Nerves Bones Joints Muscles Ligaments Body Functions Affected: 
Neuromusculoskeletal 
Movement Related Activities and Participation Affected: 
General Tasks and Demands Mobility Self Care Domestic Life Interpersonal Interactions and Relationships Community, Social and [place] Number of elements that affect the Plan of Care: 4+: HIGH COMPLEXITY CLINICAL PRESENTATION:  
Presentation: Evolving clinical presentation with changing clinical characteristics: MODERATE COMPLEXITY CLINICAL DECISION MAKING:  
[facility] AM-PAC? ?6 Clicks? Basic Mobility Inpatient Short Form How much difficulty does the patient currently have. .. Unable A Lot A Little None 1. Turning over in bed (including adjusting bedclothes, sheets and blankets)? ? 1   ? 2   ? 3   ? 4  
2. Sitting down on and standing up from a chair with arms ( e.g., wheelchair, bedside commode, etc.)   ? 1   ? 2   ? 3   ? 4  
3. Moving from lying on back to sitting on the side of the bed?   ? 1   ? 2   ? 3   ? 4 How much help from another person does the patient currently need. .. Total A Lot A Little None 4. Moving to and from a bed to a chair (including a wheelchair)? ? 1   ? 2   ? 3   ? 4  
5. Need to walk in hospital room? ? 1   ? 2   ? 3   ? 4  
6. Climbing 3-5 steps with a railing? ? 1   ? 2   ? 3   ? 4  
© 2007, Trustees of [facility] [employer], under license to [employer]. All rights reserved Score:  Initial: 24 Most Recent: X (Date: -- ) Interpretation of Tool:  Represents activities that are increasingly more difficult (i.e. Bed mobility, Transfers, Gait). Medical Necessity:    
Patient is expected to demonstrate progress in strength and range of motion 
 to decrease assistance required with HEP for discharge from improved strengthening allowed per protocol Jose Choi Reason for Services/Other Comments: 
Patient continues to require skilled intervention due to decreased strength and ROM Ivory Rabago Use of outcome tool(s) and clinical judgement create a POC that gives a: Clear prediction of patient's progress: LOW COMPLEXITY  
  
 
 
 
TREATMENT:  
(In addition to Assessment/Re-Assessment sessions the following treatments were rendered) Pre-treatment Symptoms/Complaints:  none Pain: Initial:  
Pain Intensity 1: 0  Post Session:  0/10 Therapeutic Activity: (    10 minutes): Therapeutic activities including Bed transfers and Ambulation on level ground to improve mobility, strength, balance and coordination. Required SBA Safety awareness training to promote dynamic balance in standing. Therapeutic Exercise: (15 Minutes):  Exercises per grid below to improve mobility and strength. Required minimal visual, verbal, manual and tactile cues to promote proper body alignment, promote proper body posture and promote proper body mechanics. Progressed repetitions as indicated. Date: 
4/3/19 Date: 
 Date: Activity/Exercise Parameters Parameters Parameters  
pendulums 10 X each direction Elbow flexion/extension 10 X AA, painfree range Wrist flexion/extension 10 X Forearm pronation/supination 10 X   10 X Braces/Orthotics/Lines/Etc:  
O2 Device: Room air Treatment/Session Assessment:   
Response to Treatment:  Amb 500 ft with SBA Interdisciplinary Collaboration:  
Physical Therapist 
Registered Nurse SPT After treatment position/precautions:  
Bed/Chair-wheels locked Bed in low position Call light within reach Sitting EOB Compliance with Program/Exercises: Will assess as treatment progresses Recommendations/Intent for next treatment session: \"Next visit will focus on advancements to more challenging activities and reduction in assistance provided\". Total Treatment Duration: PT Patient Time In/Time Out Time In: 2277 Time Out: 9202 Chasity Rodriguez

## 2019-04-03 NOTE — DISCHARGE INSTRUCTIONS
Maintain sling and may remove only to ROM elbow TID. · Anticoagulate with Ecotrin 325 mg orally once a day for 4 weeks  · Resume pre-hospital diet            · Resume home medications per medical continuation form     · Ambulate with walker, appropriate total joint protocol  · Follow up in office as scheduled   · Call doctor immediately if temperature is greater rtcl206.5°F, increased pain, swelling, drainage. · If shortness of breath or chest pain, immediately go to the Emergency Department        DISCHARGE SUMMARY from Nurse    PATIENT INSTRUCTIONS:    After general anesthesia or intravenous sedation, for 24 hours or while taking prescription Narcotics:  · Limit your activities  · Do not drive and operate hazardous machinery  · Do not make important personal or business decisions  · Do  not drink alcoholic beverages  · If you have not urinated within 8 hours after discharge, please contact your surgeon on call. Report the following to your surgeon:  · Excessive pain, swelling, redness or odor of or around the surgical area  · Temperature over 100.5  · Nausea and vomiting lasting longer than 4 hours or if unable to take medications  · Any signs of decreased circulation or nerve impairment to extremity: change in color, persistent  numbness, tingling, coldness or increase pain  · Any questions    What to do at Home:  Recommended activity: Activity as tolerated, per MD/PT/OT    If you experience any of the following symptoms fever > 100.5, nausea, vomiting, pain, chest pain and/or shortness of breath to ER please follow up with MD.    *  Please give a list of your current medications to your Primary Care Provider. *  Please update this list whenever your medications are discontinued, doses are      changed, or new medications (including over-the-counter products) are added. *  Please carry medication information at all times in case of emergency situations.     These are general instructions for a healthy lifestyle:    No smoking/ No tobacco products/ Avoid exposure to second hand smoke  Surgeon General's Warning:  Quitting smoking now greatly reduces serious risk to your health. Obesity, smoking, and sedentary lifestyle greatly increases your risk for illness    A healthy diet, regular physical exercise & weight monitoring are important for maintaining a healthy lifestyle    You may be retaining fluid if you have a history of heart failure or if you experience any of the following symptoms:  Weight gain of 3 pounds or more overnight or 5 pounds in a week, increased swelling in our hands or feet or shortness of breath while lying flat in bed. Please call your doctor as soon as you notice any of these symptoms; do not wait until your next office visit. Recognize signs and symptoms of STROKE:    F-face looks uneven    A-arms unable to move or move unevenly    S-speech slurred or non-existent    T-time-call 911 as soon as signs and symptoms begin-DO NOT go       Back to bed or wait to see if you get better-TIME IS BRAIN. Warning Signs of HEART ATTACK     Call 911 if you have these symptoms:   Chest discomfort. Most heart attacks involve discomfort in the center of the chest that lasts more than a few minutes, or that goes away and comes back. It can feel like uncomfortable pressure, squeezing, fullness, or pain.  Discomfort in other areas of the upper body. Symptoms can include pain or discomfort in one or both arms, the back, neck, jaw, or stomach.  Shortness of breath with or without chest discomfort.  Other signs may include breaking out in a cold sweat, nausea, or lightheadedness. Don't wait more than five minutes to call 911 - MINUTES MATTER! Fast action can save your life. Calling 911 is almost always the fastest way to get lifesaving treatment. Emergency Medical Services staff can begin treatment when they arrive -- up to an hour sooner than if someone gets to the hospital by car. The discharge information has been reviewed with the patient. The patient verbalized understanding. Discharge medications reviewed with the patient and appropriate educational materials and side effects teaching were provided.   ___________________________________________________________________________________________________________________________________

## 2019-04-03 NOTE — OP NOTES
[facility]  OPERATIVE REPORT    Name:  Mg Avelar  MR#:  [id number]  :  1965  ACCOUNT #:  [de-identified]  DATE OF SERVICE:  2019    PREOPERATIVE DIAGNOSIS:  Right proximal humeral head avascular necrosis. POSTOPERATIVE DIAGNOSIS:  Right proximal humeral head avascular necrosis. PROCEDURE PERFORMED:  Right total shoulder arthroplasty with Exactech press-fit components. SURGEON:  Destiney Higginbotham MD    ASSISTANT:  Because of the complexity of this case, it was felt that a trained assistant was necessary for the patient's benefit. Dr. Mariana Flanagan was my assistant. ANESTHESIA:  General anesthetic. COMPLICATIONS:  No complications were noted. SPECIMENS REMOVED:  none. IMPLANTS:  See brief op note. ESTIMATED BLOOD LOSS:  Approximately 200 mL. INDICATIONS:  The patient is a 59-year-old gentleman with a previous history of hip arthroplasty and he did well with that and had a diagnosis of avascular necrosis. He subsequently presented several months ago with pain and deformity in his right shoulder and findings x-ray and MRI consistent with AVN of that shoulder. It was noted that his rotator cuff was intact. We discussed different treatment options. He has opted for right total shoulder arthroplasty. He understands there are significant risks and complications included, but not limited to infection, dislocation, loosening of component which all may lead to further surgery as well as blood clots, nerve and tendon damage, and blood loss, and there are other less common but potentially serious complications that may occur, and informed consent was obtained. PROCEDURE:  The patient was seen in the preoperative area. His chart was updated. His shoulder was marked. He was taken to the operating room after a block had been placed by Anesthesia in the preop area. General anesthetic was achieved.   He was placed in the beach-chair reclining position with all prominences well padded. The shoulder was prepped and draped into a sterile field. A time-out was then effected by the surgeon. Once confirmed by the operative team and patient was given 2 g of Ancef preoperatively, an incision was made from the [facility] joint along the lateral aspect of the coracoid process to the medial aspect of the upper arm. Dissection was carried sharply through the skin and subcutaneous tissue down to the muscle layer. We then found the fibers differentiating the deltoid from the pectoralis and made our blunt dissection directly down on to the clavipectoral fascia. This was then incised. We located the biceps tendon and then excised it from its insertion proximally. We then incised the subscapularis tendon with electrocautery tagged the tendon, held this retracted, dislocated the shoulder anteriorly. There was marked changes of the humeral head with loose cartilaginous flaps and fragments. Utilizing the [facility] guide, we then osteotomized the proximal head measured for a size 47. We then broached and placed in to the appropriate size and placed in a stem  permanent 15 implant with a cap over this. We then turned our attention to the glenoid where we retracted appropriately. We circumferentially removed the labrum with sharp dissection. The actual glenoid itself was in fairly good shape as there was no evidence of AVN of the glenoid. We marked the center of this. We drilled our peg. We placed our guide. We drilled our other three holes around this. We then used the guide to remove the cartilage down to cortical bone. We sized for size medium. We then packed bone graft from the humeral head that appeared healthy into the central peg area and then implanted this press-fit component with the three pegs with a large central peg. It appeared to be very stable. We then turned our attention back to the proximal femur.   We placed in appropriate-sized humeral implant for placing the locking block and placed an Exactech stem and twisting off the head utilizing the appropriate . Once this was done, we trialed and had our appropriate size and placed our permanent. The shoulder was taken through a range of motion which was acceptable and appeared to be stable on all ranges. We utilized 3 liters of PulsaVac lavage to the shoulder joint and then closed the capsule and subscapularis through our previously placed sutures utilizing figure-of-eight configuration in the subscapularis and closed part of the rotator cuff interval with figure-of-eight FiberWire sutures. We then closed the fascial layer with 0 Vicryl and then 2-0 Vicryl and clips on the skin. EBL was approximately 200 mL. No complications were noted. He appeared to tolerate the procedure well with no complications. He will be admitted to our service.       MD IGNACIO Butts/[email or web address]  D:  04/03/2019 15:04  T:  04/03/2019 16:20  JOB #:  [id number]

## 2019-04-03 NOTE — PROGRESS NOTES
Problem: Patient Education: Go to Patient Education Activity Goal: Patient/Family Education Outcome: Progressing Towards Goal 
  
Problem: Upper Extremity Surgical Pathway: POD 1 Goal: Activity/Safety Outcome: Progressing Towards Goal 
Goal: Diagnostic Test/Procedures Outcome: Progressing Towards Goal 
Goal: Nutrition/Diet Outcome: Progressing Towards Goal 
Goal: Discharge Planning Outcome: Progressing Towards Goal 
Goal: Medications Outcome: Progressing Towards Goal 
Goal: Respiratory Outcome: Progressing Towards Goal 
Goal: Treatments/Interventions/Procedures Outcome: Progressing Towards Goal 
Goal: Psychosocial 
Outcome: Progressing Towards Goal 
  
Problem: Pain Goal: *Control of Pain Outcome: Progressing Towards Goal 
  
Problem: Patient Education: Go to Patient Education Activity Goal: Patient/Family Education Outcome: Progressing Towards Goal 
  
Problem: Falls - Risk of 
Goal: *Absence of Falls Description Document Richar Rivas Fall Risk and appropriate interventions in the flowsheet. Outcome: Progressing Towards Goal 
  
Problem: Patient Education: Go to Patient Education Activity Goal: Patient/Family Education Outcome: Progressing Towards Goal

## 2022-03-19 PROBLEM — M25.511 RIGHT SHOULDER PAIN: Status: ACTIVE | Noted: 2019-03-31

## 2022-03-20 PROBLEM — M87.00 AVASCULAR NECROSIS (HCC): Status: ACTIVE | Noted: 2019-04-01

## 2022-10-17 ENCOUNTER — TELEPHONE (OUTPATIENT)
Dept: CARDIOLOGY CLINIC | Age: 57
End: 2022-10-17

## 2022-10-17 NOTE — TELEPHONE ENCOUNTER
Patient was in the hospital for 37 days. He was taken off Lisinopril and Coreg but is wondering if he should resume that now that he is home.

## 2022-10-18 NOTE — TELEPHONE ENCOUNTER
Pt.in hospital 37 days for fistula. Came home a week ago. BP at home 145/93 most days. HR is staying . They stopped his Coreg,Lasix and Lisinopril at the hospital.FU appt. made for 11/3 w/. Should he start back on any of the above discontinued meds?

## 2022-11-03 ENCOUNTER — OFFICE VISIT (OUTPATIENT)
Dept: CARDIOLOGY CLINIC | Age: 57
End: 2022-11-03
Payer: MEDICARE

## 2022-11-03 VITALS
HEART RATE: 98 BPM | HEIGHT: 66 IN | WEIGHT: 164 LBS | DIASTOLIC BLOOD PRESSURE: 78 MMHG | SYSTOLIC BLOOD PRESSURE: 125 MMHG | BODY MASS INDEX: 26.36 KG/M2

## 2022-11-03 DIAGNOSIS — E78.2 MIXED HYPERLIPIDEMIA: Primary | ICD-10-CM

## 2022-11-03 DIAGNOSIS — I50.22 CHRONIC SYSTOLIC HEART FAILURE (HCC): ICD-10-CM

## 2022-11-03 DIAGNOSIS — I25.10 ATHEROSCLEROSIS OF NATIVE CORONARY ARTERY OF NATIVE HEART WITHOUT ANGINA PECTORIS: ICD-10-CM

## 2022-11-03 PROCEDURE — 3017F COLORECTAL CA SCREEN DOC REV: CPT | Performed by: INTERNAL MEDICINE

## 2022-11-03 PROCEDURE — G8419 CALC BMI OUT NRM PARAM NOF/U: HCPCS | Performed by: INTERNAL MEDICINE

## 2022-11-03 PROCEDURE — G8484 FLU IMMUNIZE NO ADMIN: HCPCS | Performed by: INTERNAL MEDICINE

## 2022-11-03 PROCEDURE — 4004F PT TOBACCO SCREEN RCVD TLK: CPT | Performed by: INTERNAL MEDICINE

## 2022-11-03 PROCEDURE — 99214 OFFICE O/P EST MOD 30 MIN: CPT | Performed by: INTERNAL MEDICINE

## 2022-11-03 PROCEDURE — G8427 DOCREV CUR MEDS BY ELIG CLIN: HCPCS | Performed by: INTERNAL MEDICINE

## 2022-11-03 NOTE — PROGRESS NOTES
[address] [place], [place]  [address], [address]  PHONE: [phone]    SUBJECTIVE: Govind Corrales (1965) is a 59 y.o. male seen for a follow up visit regarding the following:   Specialty Problems          Cardiology Problems    Acute myocardial infarction [facility])        Chest pain        Chronic systolic heart failure ([facility]        Coronary atherosclerosis of native coronary vessel        Hyperlipidemia         Patient is here for follow up after recent hospital stay where his medications. He was recently in the hospital with sepsis and had significant hypotension and so his BP medications were discontinued. After discharge his BP improved and so he called about his medications and he restarted his Lisinopril and Coreg. He has also restarted his Lasix in the mornings. He is currently on Doxycycline currently because he still has an open bowel to bladder fistula. Pt currently states he is felling okay and doing well. He denies any chest pain, palpitations, or shortness of breath. Patient had previously had a history of HFrEF but now he has improved ejection fraction which goes back at least to 2020 most recent echo done at [place] in August shows preserved ejection fraction. Blood pressure today is good    Past Medical History, Past Surgical History, Family history, Social History, and Medications were all reviewed with the patient today and updated as necessary.     Allergies   Allergen Reactions    Ciprofloxacin Other (See Comments)    Clopidogrel Other (See Comments)    Escitalopram Other (See Comments)    Escitalopram Oxalate Other (See Comments)    Levofloxacin Other (See Comments)    Pregabalin Other (See Comments)    Sulfa Antibiotics Hives     Past Medical History:   Diagnosis Date    Abnormal liver function     Acute myocardial infarction ([facility] [address] 4/29/2016    Anxiety     managed with medication    Arthritis     generalized    Asthma     managed with inhalers and jet neb    Autoimmune disease ([address]     Fibromyalgia    AVN of femur ([address] 2016    Bulging lumbar disc     x3 states patient     CAD (coronary artery disease) 2013    STEMI-1 stent    Chest pain 2016    Chronic anxiety 2016    Chronic obstructive pulmonary disease (HCC)      Chronic Bronchitis    Chronic pain     generalized , See Dr. Char Rodriguez    Chronic systolic heart failure ([address] 2016    Claustrophobia     mild    Congestive heart failure ([address]     EF 60% 2016    Coronary atherosclerosis of native coronary vessel 2016    Depression     managed with medication     Extradural cyst of spine     at the base of neck    GERD (gastroesophageal reflux disease)     managed with medication     Heart failure ([address]     managed with medication     History of rectal bleeding     due to Plavix, discontinued with no further difficulty    Hyperlipidemia     no present treatment     Hypertension     managed with medication     MRSA (methicillin resistant staph aureus) culture positive 2016    Old MI (myocardial infarction)     x 1    PTSD (post-traumatic stress disorder)     Right BB block     Stroke ([address]     TIA, left side    Thyroid disease     Hypoactive    Unspecified sleep apnea     CPAP compliant     Past Surgical History:   Procedure Laterality Date    CARDIAC CATHETERIZATION  2016    normal    COLONOSCOPY      HERNIA REPAIR      LEFT HEART CATH,PERCUTANEOUS  2013    stent x1 to LAD, 2 caths in  2013    ORTHOPEDIC SURGERY Right     Right hip replacement    SHOULDER ARTHROSCOPY Left     SINUS SURGERY PROC UNLISTED       x3    UPPER GASTROINTESTINAL ENDOSCOPY       Family History   Problem Relation Age of Onset    Lung Disease Mother     Diabetes Mother     Heart Disease Father         CABG-IN 52'S    Heart Disease Paternal Uncle          in 63's MI    Heart Disease Paternal Uncle         CABG in 42's    Heart Disease Paternal Aunt     Heart Disease Maternal Uncle         MI at 62yrs    Coronary Art Dis Other         Family Hx of CAD      Social History     Tobacco Use    Smoking status: Never    Smokeless tobacco: Current    Tobacco comments:     Quit smoking: chewing tobacco   Substance Use Topics    Alcohol use: No       Current Outpatient Medications:     ALPRAZolam (XANAX) 1 MG tablet, 1 mg 4 times daily as needed. , Disp: , Rfl:     aspirin 81 MG EC tablet, Take 81 mg by mouth daily, Disp: , Rfl:     baclofen (LIORESAL) 10 MG tablet, Take 10 mg by mouth 3 times daily, Disp: , Rfl:     carvedilol (COREG) 3.125 MG tablet, Take 3.125 mg by mouth daily, Disp: , Rfl:     cetirizine (ZYRTEC) 10 MG tablet, Take 10 mg by mouth daily as needed, Disp: , Rfl:     dicyclomine (BENTYL) 10 MG capsule, Take 10 mg by mouth 2 times daily, Disp: , Rfl:     divalproex (DEPAKOTE) 500 MG DR tablet, Take 1,000 mg by mouth 2 times daily, Disp: , Rfl:     docusate sodium (COLACE) 50 MG capsule, Take 50 mg by mouth, Disp: , Rfl:     ergocalciferol (ERGOCALCIFEROL) 1.25 MG (55932 UT) capsule, Take 50,000 Units by mouth, Disp: , Rfl:     ferrous sulfate (IRON 325) 325 (65 Fe) MG tablet, Take by mouth, Disp: , Rfl:     fluticasone-vilanterol (BREO ELLIPTA) 100-25 MCG/INH AEPB inhaler, Inhale 1 puff into the lungs every other day, Disp: , Rfl:     fluticasone (FLONASE) 50 MCG/ACT nasal spray, 2 sprays by Nasal route daily as needed, Disp: , Rfl:     furosemide (LASIX) 20 MG tablet, Take 20 mg by mouth daily, Disp: , Rfl:     HYDROcodone-acetaminophen (NORCO)  MG per tablet, Take 1 tablet by mouth every 6 hours as needed. , Disp: , Rfl:     ipratropium-albuterol (DUONEB) 0.5-2.5 (3) MG/3ML SOLN nebulizer solution, Inhale 3 mLs into the lungs every 6 hours as needed, Disp: , Rfl:     levalbuterol (XOPENEX HFA) 45 MCG/ACT inhaler, Inhale 2 puffs into the lungs every 4 hours as needed, Disp: , Rfl:     levothyroxine (SYNTHROID) 50 MCG tablet, Take 50 mcg by mouth every morning (before breakfast), Disp: , Rfl:     lisinopril (PRINIVIL;ZESTRIL) 5 MG tablet, TAKE 1 TABLET BY MOUTH EVERY DAY, Disp: , Rfl:     nitroGLYCERIN (NITROSTAT) 0.4 MG SL tablet, Place 1 sl under the tongue q 5 min prn cp, max 3 sl in a 15-min time period. Call 911 if no relief after the 3rd sl., Disp: , Rfl:     OLANZapine (ZYPREXA) 5 MG tablet, TAKE 1 TABLET BY MOUTH AT BEDTIME, Disp: , Rfl:     pantoprazole (PROTONIX) 40 MG tablet, Take 40 mg by mouth 2 times daily, Disp: , Rfl:     tapentadol (NUCYNTA ER) 150 MG TB12 extended release tablet, Take 150 mg by mouth 2 times daily. , Disp: , Rfl:     testosterone cypionate (DEPOTESTOTERONE CYPIONATE) 100 MG/ML injection, Inject into the muscle every 30 days. , Disp: , Rfl:     ROS:  Review of Systems - General ROS: negative for - chills, fatigue or fever  Hematological and Lymphatic ROS: negative for - bleeding problems, bruising or jaundice  Respiratory ROS: no cough, shortness of breath, or wheezing  Cardiovascular ROS: no chest pain or dyspnea on exertion  Gastrointestinal ROS: no abdominal pain, change in bowel habits, or black or bloody stools  Neurological ROS: no TIA or stroke symptoms  All other systems negative.     Wt Readings from Last 3 Encounters:   11/03/22 164 lb (74.4 kg)     Temp Readings from Last 3 Encounters:   No data found for Temp     BP Readings from Last 3 Encounters:   11/03/22 125/78     Pulse Readings from Last 3 Encounters:   11/03/22 98       PHYSICAL EXAM:  /78   Pulse 98   Ht 5' 6\" (1.676 m)   Wt 164 lb (74.4 kg)   BMI 26.47 kg/m²   Physical Examination: General appearance - alert, well appearing, and in no distress  Mental status - alert, oriented to person, place, and time  Eyes - pupils equal and reactive, extraocular eye movements intact  Neck/lymph - supple, no significant adenopathy  Chest/lungs - clear to auscultation, no wheezes, rales or rhonchi, symmetric air entry  Heart/CV - normal rate, regular rhythm, normal S1, S2, no murmurs, rubs, clicks or gallops  Abdomen/GI - soft, nontender, nondistended, no masses or organomegaly  Musculoskeletal - no joint tenderness, deformity or swelling  Extremities - no pedal edema, no clubbing or cyanosis  Skin - normal coloration and turgor, no rashes, no suspicious skin lesions noted    EKG: .         Medications reviewed and questions answered    No results found for this or any previous visit (from the past 672 hour(s)). No results found for: CHOL, CHOLPOCT, CHOLX, CHLST, CHOLV, HDL, HDLPOC, HDLC, LDL, LDLC, VLDLC, VLDL, TGLX, TRIGL    ASSESSMENT and PLAN  Problem List Items Addressed This Visit          Circulatory    Coronary atherosclerosis of native coronary vessel    Chronic systolic heart failure (HCC)       Other    Hyperlipidemia - Primary      Coronary Artery Disease:   - Stable, no anginal symptoms  - Continue current regimen    Chronic Systolic Heart Failure:  - Most recent echo is from 3/2020, EF at that time was normal at 16-27%, diastolic function indeterminate. - On ACE-I and Beta blocker, not on MRA or SGLT2 therapy    Hypertension:   - Pt's BP was low while in the hospital for sepsis and his BP medications were discontinued. He has since resumed his medications and BP has remained stable  - Continue current regimen    Hyperlipidemia:   - Most recent lipid panel from 8/8/2022 showed total cholesterol 94, LDL 52, HDL 19. Continue meds as below    Current Outpatient Medications:     ALPRAZolam (XANAX) 1 MG tablet, 1 mg 4 times daily as needed. , Disp: , Rfl:     aspirin 81 MG EC tablet, Take 81 mg by mouth daily, Disp: , Rfl:     baclofen (LIORESAL) 10 MG tablet, Take 10 mg by mouth 3 times daily, Disp: , Rfl:     carvedilol (COREG) 3.125 MG tablet, Take 3.125 mg by mouth daily, Disp: , Rfl:     cetirizine (ZYRTEC) 10 MG tablet, Take 10 mg by mouth daily as needed, Disp: , Rfl:     dicyclomine (BENTYL) 10 MG capsule, Take 10 mg by mouth 2 times daily, Disp: , Rfl: divalproex (DEPAKOTE) 500 MG DR tablet, Take 1,000 mg by mouth 2 times daily, Disp: , Rfl:     docusate sodium (COLACE) 50 MG capsule, Take 50 mg by mouth, Disp: , Rfl:     ergocalciferol (ERGOCALCIFEROL) 1.25 MG (57928 UT) capsule, Take 50,000 Units by mouth, Disp: , Rfl:     ferrous sulfate (IRON 325) 325 (65 Fe) MG tablet, Take by mouth, Disp: , Rfl:     fluticasone-vilanterol (BREO ELLIPTA) 100-25 MCG/INH AEPB inhaler, Inhale 1 puff into the lungs every other day, Disp: , Rfl:     fluticasone (FLONASE) 50 MCG/ACT nasal spray, 2 sprays by Nasal route daily as needed, Disp: , Rfl:     furosemide (LASIX) 20 MG tablet, Take 20 mg by mouth daily, Disp: , Rfl:     HYDROcodone-acetaminophen (NORCO)  MG per tablet, Take 1 tablet by mouth every 6 hours as needed. , Disp: , Rfl:     ipratropium-albuterol (DUONEB) 0.5-2.5 (3) MG/3ML SOLN nebulizer solution, Inhale 3 mLs into the lungs every 6 hours as needed, Disp: , Rfl:     levalbuterol (XOPENEX HFA) 45 MCG/ACT inhaler, Inhale 2 puffs into the lungs every 4 hours as needed, Disp: , Rfl:     levothyroxine (SYNTHROID) 50 MCG tablet, Take 50 mcg by mouth every morning (before breakfast), Disp: , Rfl:     lisinopril (PRINIVIL;ZESTRIL) 5 MG tablet, TAKE 1 TABLET BY MOUTH EVERY DAY, Disp: , Rfl:     nitroGLYCERIN (NITROSTAT) 0.4 MG SL tablet, Place 1 sl under the tongue q 5 min prn cp, max 3 sl in a 15-min time period. Call 911 if no relief after the 3rd sl., Disp: , Rfl:     OLANZapine (ZYPREXA) 5 MG tablet, TAKE 1 TABLET BY MOUTH AT BEDTIME, Disp: , Rfl:     pantoprazole (PROTONIX) 40 MG tablet, Take 40 mg by mouth 2 times daily, Disp: , Rfl:     tapentadol (NUCYNTA ER) 150 MG TB12 extended release tablet, Take 150 mg by mouth 2 times daily. , Disp: , Rfl:     testosterone cypionate (DEPOTESTOTERONE CYPIONATE) 100 MG/ML injection, Inject into the muscle every 30 days. , Disp: , Rfl:     Carter Andujar MD  11/3/2022  8:32 AM    Pt is instructed to follow all appropriate cardiac risk factor recommendations and to be compliant with meds and testing. Instructed to follow up appropriately and seek urgent medical care if acute or unstable issues arise. Results of some tests may be viewed thru [address] but this does not substitute for follow up with MD. If follow up is not scheduled pt is instructed to call for follow up    I have personally seen and evaluated the patient and reviewed the students note and agree with the following assessment and plan and findings. I was present for and observed the key components of this note. Any appropriate additions or editing of the information have been done by me.     Randall Landau MD, [address] [facility]  Cardiology

## 2024-03-12 ENCOUNTER — APPOINTMENT (OUTPATIENT)
Dept: GENERAL RADIOLOGY | Age: 59
DRG: 321 | End: 2024-03-12
Attending: INTERNAL MEDICINE
Payer: MEDICARE

## 2024-03-12 ENCOUNTER — HOSPITAL ENCOUNTER (INPATIENT)
Age: 59
LOS: 14 days | Discharge: NURSING, REHAB OR LONG-TERM CARE | DRG: 321 | End: 2024-03-27
Attending: INTERNAL MEDICINE | Admitting: INTERNAL MEDICINE
Payer: MEDICARE

## 2024-03-12 ENCOUNTER — APPOINTMENT (OUTPATIENT)
Dept: NON INVASIVE DIAGNOSTICS | Age: 59
DRG: 321 | End: 2024-03-12
Attending: INTERNAL MEDICINE
Payer: MEDICARE

## 2024-03-12 DIAGNOSIS — R79.89 ELEVATED TROPONIN: Primary | ICD-10-CM

## 2024-03-12 DIAGNOSIS — I25.119 CORONARY ARTERY DISEASE INVOLVING NATIVE CORONARY ARTERY OF NATIVE HEART WITH ANGINA PECTORIS (HCC): ICD-10-CM

## 2024-03-12 DIAGNOSIS — F41.9 CHRONIC ANXIETY: ICD-10-CM

## 2024-03-12 DIAGNOSIS — R62.7 FAILURE TO THRIVE IN ADULT: ICD-10-CM

## 2024-03-12 DIAGNOSIS — R07.9 CHEST PAIN: ICD-10-CM

## 2024-03-12 DIAGNOSIS — R90.89 ABNORMAL BRAIN MRI: ICD-10-CM

## 2024-03-12 PROBLEM — J39.8: Status: ACTIVE | Noted: 2024-03-12

## 2024-03-12 PROBLEM — T17.500A MUCUS PLUGGING OF BRONCHI: Status: ACTIVE | Noted: 2024-03-12

## 2024-03-12 PROBLEM — J18.9 PNEUMONIA OF LEFT LOWER LOBE DUE TO INFECTIOUS ORGANISM: Status: ACTIVE | Noted: 2024-03-12

## 2024-03-12 LAB
ECHO AO ASC DIAM: 3.7 CM
ECHO AO ROOT DIAM: 3.4 CM
ECHO AV AREA PEAK VELOCITY: 2.4 CM2
ECHO AV AREA VTI: 2.9 CM2
ECHO AV MEAN GRADIENT: 5 MMHG
ECHO AV MEAN VELOCITY: 1 M/S
ECHO AV PEAK GRADIENT: 9 MMHG
ECHO AV PEAK VELOCITY: 1.5 M/S
ECHO AV VELOCITY RATIO: 0.67
ECHO AV VTI: 23.3 CM
ECHO EST RA PRESSURE: 3 MMHG
ECHO IVC PROX: 1.8 CM
ECHO LA DIAMETER: 3.2 CM
ECHO LA TO AORTIC ROOT RATIO: 0.94
ECHO LV EDV A2C: 76 ML
ECHO LV EDV A4C: 50 ML
ECHO LV EJECTION FRACTION A2C: 60 %
ECHO LV EJECTION FRACTION A4C: 60 %
ECHO LV EJECTION FRACTION BIPLANE: 60 % (ref 55–100)
ECHO LV ESV A2C: 31 ML
ECHO LV ESV A4C: 20 ML
ECHO LV FRACTIONAL SHORTENING: 32 % (ref 28–44)
ECHO LV INTERNAL DIMENSION DIASTOLIC: 4.7 CM (ref 4.2–5.9)
ECHO LV INTERNAL DIMENSION SYSTOLIC: 3.2 CM
ECHO LV IVSD: 1 CM (ref 0.6–1)
ECHO LV MASS 2D: 164.5 G (ref 88–224)
ECHO LV POSTERIOR WALL DIASTOLIC: 1 CM (ref 0.6–1)
ECHO LV RELATIVE WALL THICKNESS RATIO: 0.43
ECHO LVOT AREA: 3.8 CM2
ECHO LVOT AV VTI INDEX: 0.76
ECHO LVOT DIAM: 2.2 CM
ECHO LVOT MEAN GRADIENT: 2 MMHG
ECHO LVOT PEAK GRADIENT: 4 MMHG
ECHO LVOT PEAK VELOCITY: 1 M/S
ECHO LVOT SV: 67.6 ML
ECHO LVOT VTI: 17.8 CM
ECHO MV A VELOCITY: 0.95 M/S
ECHO MV E DECELERATION TIME (DT): 130 MS
ECHO MV E VELOCITY: 0.65 M/S
ECHO MV E/A RATIO: 0.68
ECHO PV ACCELERATION TIME (AT): 85 MS
ECHO PV MAX VELOCITY: 1.3 M/S
ECHO PV PEAK GRADIENT: 7 MMHG
ECHO RIGHT VENTRICULAR SYSTOLIC PRESSURE (RVSP): 27 MMHG
ECHO RV BASAL DIMENSION: 2.9 CM
ECHO RV INTERNAL DIMENSION: 3.2 CM
ECHO RV TAPSE: 1.9 CM (ref 1.7–?)
ECHO TV REGURGITANT MAX VELOCITY: 2.43 M/S
ECHO TV REGURGITANT PEAK GRADIENT: 24 MMHG
EKG ATRIAL RATE: 82 BPM
EKG DIAGNOSIS: NORMAL
EKG P AXIS: 34 DEGREES
EKG P-R INTERVAL: 160 MS
EKG Q-T INTERVAL: 364 MS
EKG QRS DURATION: 110 MS
EKG QTC CALCULATION (BAZETT): 425 MS
EKG R AXIS: 13 DEGREES
EKG T AXIS: 108 DEGREES
EKG VENTRICULAR RATE: 82 BPM
TROPONIN I SERPL HS-MCNC: 1062 PG/ML (ref 0–14)
TROPONIN I SERPL HS-MCNC: 1083.1 PG/ML (ref 0–14)
TROPONIN I SERPL HS-MCNC: 943.3 PG/ML (ref 0–14)

## 2024-03-12 PROCEDURE — 96372 THER/PROPH/DIAG INJ SC/IM: CPT

## 2024-03-12 PROCEDURE — 94640 AIRWAY INHALATION TREATMENT: CPT

## 2024-03-12 PROCEDURE — 96365 THER/PROPH/DIAG IV INF INIT: CPT

## 2024-03-12 PROCEDURE — 36415 COLL VENOUS BLD VENIPUNCTURE: CPT

## 2024-03-12 PROCEDURE — 6360000002 HC RX W HCPCS: Performed by: PHYSICIAN ASSISTANT

## 2024-03-12 PROCEDURE — 96368 THER/DIAG CONCURRENT INF: CPT

## 2024-03-12 PROCEDURE — 2580000003 HC RX 258: Performed by: INTERNAL MEDICINE

## 2024-03-12 PROCEDURE — 84484 ASSAY OF TROPONIN QUANT: CPT

## 2024-03-12 PROCEDURE — 96366 THER/PROPH/DIAG IV INF ADDON: CPT

## 2024-03-12 PROCEDURE — 87040 BLOOD CULTURE FOR BACTERIA: CPT

## 2024-03-12 PROCEDURE — 93005 ELECTROCARDIOGRAM TRACING: CPT | Performed by: PHYSICIAN ASSISTANT

## 2024-03-12 PROCEDURE — 99223 1ST HOSP IP/OBS HIGH 75: CPT | Performed by: INTERNAL MEDICINE

## 2024-03-12 PROCEDURE — 2580000003 HC RX 258: Performed by: PHYSICIAN ASSISTANT

## 2024-03-12 PROCEDURE — G0378 HOSPITAL OBSERVATION PER HR: HCPCS

## 2024-03-12 PROCEDURE — 2500000003 HC RX 250 WO HCPCS

## 2024-03-12 PROCEDURE — 93010 ELECTROCARDIOGRAM REPORT: CPT | Performed by: INTERNAL MEDICINE

## 2024-03-12 PROCEDURE — 93306 TTE W/DOPPLER COMPLETE: CPT

## 2024-03-12 PROCEDURE — 6370000000 HC RX 637 (ALT 250 FOR IP): Performed by: PHYSICIAN ASSISTANT

## 2024-03-12 PROCEDURE — 94761 N-INVAS EAR/PLS OXIMETRY MLT: CPT

## 2024-03-12 PROCEDURE — 6360000002 HC RX W HCPCS: Performed by: INTERNAL MEDICINE

## 2024-03-12 PROCEDURE — 71046 X-RAY EXAM CHEST 2 VIEWS: CPT

## 2024-03-12 PROCEDURE — 93306 TTE W/DOPPLER COMPLETE: CPT | Performed by: INTERNAL MEDICINE

## 2024-03-12 RX ORDER — SODIUM CHLORIDE 0.9 % (FLUSH) 0.9 %
5-40 SYRINGE (ML) INJECTION PRN
Status: DISCONTINUED | OUTPATIENT
Start: 2024-03-12 | End: 2024-03-27 | Stop reason: HOSPADM

## 2024-03-12 RX ORDER — ATORVASTATIN CALCIUM 40 MG/1
40 TABLET, FILM COATED ORAL NIGHTLY
Status: DISCONTINUED | OUTPATIENT
Start: 2024-03-12 | End: 2024-03-15

## 2024-03-12 RX ORDER — ASPIRIN 81 MG/1
81 TABLET, CHEWABLE ORAL DAILY
Status: DISCONTINUED | OUTPATIENT
Start: 2024-03-13 | End: 2024-03-12

## 2024-03-12 RX ORDER — LISINOPRIL 5 MG/1
5 TABLET ORAL DAILY
Status: DISCONTINUED | OUTPATIENT
Start: 2024-03-12 | End: 2024-03-18

## 2024-03-12 RX ORDER — CETIRIZINE HYDROCHLORIDE 10 MG/1
10 TABLET ORAL DAILY PRN
Status: DISCONTINUED | OUTPATIENT
Start: 2024-03-12 | End: 2024-03-27 | Stop reason: HOSPADM

## 2024-03-12 RX ORDER — ONDANSETRON 2 MG/ML
4 INJECTION INTRAMUSCULAR; INTRAVENOUS EVERY 6 HOURS PRN
Status: DISCONTINUED | OUTPATIENT
Start: 2024-03-12 | End: 2024-03-27 | Stop reason: HOSPADM

## 2024-03-12 RX ORDER — SODIUM CHLORIDE 0.9 % (FLUSH) 0.9 %
5-40 SYRINGE (ML) INJECTION EVERY 12 HOURS SCHEDULED
Status: DISCONTINUED | OUTPATIENT
Start: 2024-03-12 | End: 2024-03-27 | Stop reason: HOSPADM

## 2024-03-12 RX ORDER — POTASSIUM CHLORIDE 20 MEQ/1
40 TABLET, EXTENDED RELEASE ORAL PRN
Status: DISCONTINUED | OUTPATIENT
Start: 2024-03-12 | End: 2024-03-27 | Stop reason: HOSPADM

## 2024-03-12 RX ORDER — CARVEDILOL 3.12 MG/1
3.12 TABLET ORAL DAILY
Status: DISCONTINUED | OUTPATIENT
Start: 2024-03-13 | End: 2024-03-18

## 2024-03-12 RX ORDER — CELECOXIB 100 MG/1
200 CAPSULE ORAL NIGHTLY
COMMUNITY

## 2024-03-12 RX ORDER — DICYCLOMINE HYDROCHLORIDE 10 MG/1
20 CAPSULE ORAL 2 TIMES DAILY
Status: DISCONTINUED | OUTPATIENT
Start: 2024-03-12 | End: 2024-03-27 | Stop reason: HOSPADM

## 2024-03-12 RX ORDER — DIVALPROEX SODIUM 500 MG/1
1000 TABLET, DELAYED RELEASE ORAL 2 TIMES DAILY
Status: DISCONTINUED | OUTPATIENT
Start: 2024-03-12 | End: 2024-03-17

## 2024-03-12 RX ORDER — IPRATROPIUM BROMIDE AND ALBUTEROL SULFATE 2.5; .5 MG/3ML; MG/3ML
1 SOLUTION RESPIRATORY (INHALATION) EVERY 6 HOURS PRN
Status: DISCONTINUED | OUTPATIENT
Start: 2024-03-12 | End: 2024-03-27 | Stop reason: HOSPADM

## 2024-03-12 RX ORDER — ALBUTEROL SULFATE 2.5 MG/3ML
2.5 SOLUTION RESPIRATORY (INHALATION)
Status: DISCONTINUED | OUTPATIENT
Start: 2024-03-12 | End: 2024-03-22

## 2024-03-12 RX ORDER — LEVOTHYROXINE SODIUM 0.05 MG/1
50 TABLET ORAL
Status: DISCONTINUED | OUTPATIENT
Start: 2024-03-13 | End: 2024-03-27 | Stop reason: HOSPADM

## 2024-03-12 RX ORDER — ACETAMINOPHEN 325 MG/1
650 TABLET ORAL EVERY 6 HOURS PRN
Status: DISCONTINUED | OUTPATIENT
Start: 2024-03-12 | End: 2024-03-27 | Stop reason: HOSPADM

## 2024-03-12 RX ORDER — LISINOPRIL 5 MG/1
5 TABLET ORAL DAILY
Status: DISCONTINUED | OUTPATIENT
Start: 2024-03-13 | End: 2024-03-12

## 2024-03-12 RX ORDER — TOPIRAMATE SPINKLE 25 MG/1
50 CAPSULE ORAL 2 TIMES DAILY
COMMUNITY

## 2024-03-12 RX ORDER — ASPIRIN 81 MG/1
81 TABLET ORAL DAILY
Status: DISCONTINUED | OUTPATIENT
Start: 2024-03-12 | End: 2024-03-27 | Stop reason: HOSPADM

## 2024-03-12 RX ORDER — HYDROCODONE BITARTRATE AND ACETAMINOPHEN 10; 325 MG/1; MG/1
1 TABLET ORAL EVERY 6 HOURS PRN
Status: DISCONTINUED | OUTPATIENT
Start: 2024-03-12 | End: 2024-03-14

## 2024-03-12 RX ORDER — MAGNESIUM SULFATE IN WATER 40 MG/ML
2000 INJECTION, SOLUTION INTRAVENOUS PRN
Status: DISCONTINUED | OUTPATIENT
Start: 2024-03-12 | End: 2024-03-27 | Stop reason: HOSPADM

## 2024-03-12 RX ORDER — FLUTICASONE PROPIONATE 50 MCG
2 SPRAY, SUSPENSION (ML) NASAL DAILY PRN
Status: DISCONTINUED | OUTPATIENT
Start: 2024-03-12 | End: 2024-03-27 | Stop reason: HOSPADM

## 2024-03-12 RX ORDER — ONDANSETRON 4 MG/1
4 TABLET, ORALLY DISINTEGRATING ORAL EVERY 8 HOURS PRN
Status: DISCONTINUED | OUTPATIENT
Start: 2024-03-12 | End: 2024-03-27 | Stop reason: HOSPADM

## 2024-03-12 RX ORDER — ALBUTEROL SULFATE 90 UG/1
2 AEROSOL, METERED RESPIRATORY (INHALATION) EVERY 4 HOURS PRN
Status: DISCONTINUED | OUTPATIENT
Start: 2024-03-12 | End: 2024-03-12

## 2024-03-12 RX ORDER — POLYETHYLENE GLYCOL 3350 17 G/17G
17 POWDER, FOR SOLUTION ORAL DAILY PRN
Status: DISCONTINUED | OUTPATIENT
Start: 2024-03-12 | End: 2024-03-27 | Stop reason: HOSPADM

## 2024-03-12 RX ORDER — SODIUM CHLORIDE 9 MG/ML
INJECTION, SOLUTION INTRAVENOUS PRN
Status: DISCONTINUED | OUTPATIENT
Start: 2024-03-12 | End: 2024-03-27 | Stop reason: HOSPADM

## 2024-03-12 RX ORDER — TOPIRAMATE 25 MG/1
50 TABLET ORAL 2 TIMES DAILY
Status: DISCONTINUED | OUTPATIENT
Start: 2024-03-12 | End: 2024-03-27 | Stop reason: HOSPADM

## 2024-03-12 RX ORDER — ASPIRIN 81 MG/1
81 TABLET ORAL DAILY
Status: DISCONTINUED | OUTPATIENT
Start: 2024-03-13 | End: 2024-03-12

## 2024-03-12 RX ORDER — PANTOPRAZOLE SODIUM 40 MG/1
40 TABLET, DELAYED RELEASE ORAL 2 TIMES DAILY
Status: DISCONTINUED | OUTPATIENT
Start: 2024-03-12 | End: 2024-03-27 | Stop reason: HOSPADM

## 2024-03-12 RX ORDER — ALPRAZOLAM 0.5 MG/1
1 TABLET ORAL 4 TIMES DAILY PRN
Status: DISCONTINUED | OUTPATIENT
Start: 2024-03-12 | End: 2024-03-13

## 2024-03-12 RX ORDER — POTASSIUM CHLORIDE 7.45 MG/ML
10 INJECTION INTRAVENOUS PRN
Status: DISCONTINUED | OUTPATIENT
Start: 2024-03-12 | End: 2024-03-27 | Stop reason: HOSPADM

## 2024-03-12 RX ORDER — ENOXAPARIN SODIUM 100 MG/ML
40 INJECTION SUBCUTANEOUS DAILY
Status: DISCONTINUED | OUTPATIENT
Start: 2024-03-12 | End: 2024-03-27 | Stop reason: HOSPADM

## 2024-03-12 RX ORDER — OLANZAPINE 5 MG/1
5 TABLET ORAL NIGHTLY
Status: DISCONTINUED | OUTPATIENT
Start: 2024-03-12 | End: 2024-03-27 | Stop reason: HOSPADM

## 2024-03-12 RX ORDER — SODIUM CHLORIDE FOR INHALATION 3 %
4 VIAL, NEBULIZER (ML) INHALATION
Status: DISCONTINUED | OUTPATIENT
Start: 2024-03-12 | End: 2024-03-22

## 2024-03-12 RX ADMIN — Medication 4 ML: at 19:49

## 2024-03-12 RX ADMIN — ENOXAPARIN SODIUM 40 MG: 100 INJECTION SUBCUTANEOUS at 13:50

## 2024-03-12 RX ADMIN — ALBUTEROL SULFATE 2.5 MG: 2.5 SOLUTION RESPIRATORY (INHALATION) at 19:49

## 2024-03-12 RX ADMIN — DICYCLOMINE HYDROCHLORIDE 10 MG: 10 CAPSULE ORAL at 21:41

## 2024-03-12 RX ADMIN — ACETAMINOPHEN 650 MG: 325 TABLET ORAL at 17:10

## 2024-03-12 RX ADMIN — TUBERCULIN PURIFIED PROTEIN DERIVATIVE 5 UNITS: 5 INJECTION, SOLUTION INTRADERMAL at 21:53

## 2024-03-12 RX ADMIN — AZITHROMYCIN MONOHYDRATE 500 MG: 500 INJECTION, POWDER, LYOPHILIZED, FOR SOLUTION INTRAVENOUS at 13:57

## 2024-03-12 RX ADMIN — CEFTRIAXONE SODIUM 2000 MG: 2 INJECTION, POWDER, FOR SOLUTION INTRAMUSCULAR; INTRAVENOUS at 13:56

## 2024-03-12 RX ADMIN — TOPIRAMATE 50 MG: 25 TABLET, FILM COATED ORAL at 21:41

## 2024-03-12 RX ADMIN — ALBUTEROL SULFATE 2.5 MG: 2.5 SOLUTION RESPIRATORY (INHALATION) at 15:23

## 2024-03-12 RX ADMIN — ALBUTEROL SULFATE 2.5 MG: 2.5 SOLUTION RESPIRATORY (INHALATION) at 13:05

## 2024-03-12 RX ADMIN — SODIUM CHLORIDE, PRESERVATIVE FREE 10 ML: 5 INJECTION INTRAVENOUS at 21:42

## 2024-03-12 RX ADMIN — IPRATROPIUM BROMIDE AND ALBUTEROL SULFATE 1 DOSE: 2.5; .5 SOLUTION RESPIRATORY (INHALATION) at 23:32

## 2024-03-12 RX ADMIN — MOMETASONE FUROATE AND FORMOTEROL FUMARATE DIHYDRATE 2 PUFF: 100; 5 AEROSOL RESPIRATORY (INHALATION) at 19:49

## 2024-03-12 RX ADMIN — OLANZAPINE 5 MG: 5 TABLET, FILM COATED ORAL at 21:41

## 2024-03-12 RX ADMIN — ATORVASTATIN CALCIUM 40 MG: 40 TABLET, FILM COATED ORAL at 21:41

## 2024-03-12 RX ADMIN — PANTOPRAZOLE SODIUM 40 MG: 40 TABLET, DELAYED RELEASE ORAL at 21:41

## 2024-03-12 RX ADMIN — Medication 4 ML: at 13:06

## 2024-03-12 RX ADMIN — DIVALPROEX SODIUM 1000 MG: 500 TABLET, DELAYED RELEASE ORAL at 21:41

## 2024-03-12 RX ADMIN — ASPIRIN 81 MG: 81 TABLET, COATED ORAL at 15:51

## 2024-03-12 RX ADMIN — SODIUM CHLORIDE, PRESERVATIVE FREE 10 ML: 5 INJECTION INTRAVENOUS at 13:51

## 2024-03-12 ASSESSMENT — PAIN SCALES - GENERAL
PAINLEVEL_OUTOF10: 8
PAINLEVEL_OUTOF10: 3

## 2024-03-12 ASSESSMENT — PAIN - FUNCTIONAL ASSESSMENT: PAIN_FUNCTIONAL_ASSESSMENT: ACTIVITIES ARE NOT PREVENTED

## 2024-03-12 ASSESSMENT — PAIN DESCRIPTION - LOCATION: LOCATION: BACK

## 2024-03-12 ASSESSMENT — PAIN DESCRIPTION - DESCRIPTORS: DESCRIPTORS: ACHING

## 2024-03-12 NOTE — H&P
[facility] Cardiology Initial Cardiac Evaluation      Date of  Admission: 3/12/2024  9:47 AM     Primary Care Physician: Benito Alonzo MD  Primary Cardiologist: Dr Wallace  Referring Physician: Ronen Fisher  Supervising Physician: Dr Trotter    CC/Reason for evaluation: elevated troponin     HPI:  Katarina Zuñiga is a 58 y.o. male with prior history of CAD s/p PCI pLAD 5/17/2013, last [facility] 2016 with mild nonobstructive coronary artery disease with patent stent, HTN, dyslipidemia, DM, COPD who presented to [facility] ED on 3/11 with complaint of generalized weakness for the past one month. States weakness progressively worse over the last two week. States he always has shortness of breath and cough but over last two weeks shortness of breath and cough have both increased. Denies any fever or chills.    Work up in ED at [facility]: WBC 10.4, H/H 12.8/40.3, Ptl 190, Na 143, K 4.3, BUN/Cr 22/0.96, trop 0.39- 0.71. CXR showed right lung base opacities, concerning for infection. CTA chest with bibasilar groundglass and nodular opacities. Left lower lobe consolidation. Bilateral mucus plugging. No acute pulmonary arterial thromboembolism. He was given Unasyn and 1L bolus in ED. Patient transferred to [facility] to cardiology service for elevated troponin. Patient denies chest pain, pressure, or tightness.     Past Medical History:   Diagnosis Date    Abnormal liver function     Acute myocardial infarction (HCC) 4/29/2016    Anxiety     managed with medication    Arthritis     generalized    Asthma     managed with inhalers and jet neb    Autoimmune disease (HCC)     Fibromyalgia    AVN of femur (HCC) 4/18/2016    Bulging lumbar disc     x3 states patient     CAD (coronary artery disease) 5/16/2013    STEMI-1 stent    Chest pain 4/29/2016    Chronic anxiety 4/29/2016    Chronic obstructive pulmonary disease (HCC)      Chronic Bronchitis    Chronic pain     generalized , See Dr. Berg    Chronic

## 2024-03-12 NOTE — MANAGEMENT PLAN
Per Dr. Trotter request,   IP consult to CM/PT/OT & PPD ordered due to high likelihood patient will require rehab prior to going home.

## 2024-03-13 PROBLEM — J44.9 COPD (CHRONIC OBSTRUCTIVE PULMONARY DISEASE) (HCC): Chronic | Status: ACTIVE | Noted: 2018-10-04

## 2024-03-13 PROBLEM — F43.10 POST TRAUMATIC STRESS DISORDER (PTSD): Status: ACTIVE | Noted: 2018-10-04

## 2024-03-13 PROBLEM — K21.9 GASTROESOPHAGEAL REFLUX DISEASE WITHOUT ESOPHAGITIS: Status: ACTIVE | Noted: 2018-10-04

## 2024-03-13 PROBLEM — G47.33 OSA ON CPAP: Status: ACTIVE | Noted: 2018-10-04

## 2024-03-13 PROBLEM — E11.9 TYPE 2 DIABETES MELLITUS (HCC): Chronic | Status: ACTIVE | Noted: 2018-10-04

## 2024-03-13 PROBLEM — E03.9 ACQUIRED HYPOTHYROIDISM: Chronic | Status: ACTIVE | Noted: 2018-10-04

## 2024-03-13 PROBLEM — I10 ESSENTIAL HYPERTENSION: Chronic | Status: ACTIVE | Noted: 2018-10-04

## 2024-03-13 PROBLEM — D50.8 IRON DEFICIENCY ANEMIA SECONDARY TO INADEQUATE DIETARY IRON INTAKE: Status: ACTIVE | Noted: 2018-10-04

## 2024-03-13 LAB
25(OH)D3 SERPL-MCNC: 40.1 NG/ML (ref 30–100)
ANION GAP SERPL CALC-SCNC: 9 MMOL/L (ref 2–11)
BUN SERPL-MCNC: 10 MG/DL (ref 8–23)
CALCIUM SERPL-MCNC: 8.9 MG/DL (ref 8.3–10.4)
CHLORIDE SERPL-SCNC: 114 MMOL/L (ref 103–113)
CHOLEST SERPL-MCNC: 188 MG/DL
CO2 SERPL-SCNC: 21 MMOL/L (ref 21–32)
CREAT SERPL-MCNC: 0.8 MG/DL (ref 0.8–1.5)
ERYTHROCYTE [DISTWIDTH] IN BLOOD BY AUTOMATED COUNT: 14.8 % (ref 11.9–14.6)
FOLATE SERPL-MCNC: 13.5 NG/ML (ref 3.1–17.5)
GLUCOSE SERPL-MCNC: 123 MG/DL (ref 65–100)
HCT VFR BLD AUTO: 38.8 % (ref 41.1–50.3)
HDLC SERPL-MCNC: 36 MG/DL (ref 40–60)
HDLC SERPL: 5.2
HGB BLD-MCNC: 12.4 G/DL (ref 13.6–17.2)
LDLC SERPL CALC-MCNC: 124.2 MG/DL
MCH RBC QN AUTO: 30.5 PG (ref 26.1–32.9)
MCHC RBC AUTO-ENTMCNC: 32 G/DL (ref 31.4–35)
MCV RBC AUTO: 95.6 FL (ref 82–102)
MM INDURATION, POC: 0 MM (ref 0–5)
NRBC # BLD: 0 K/UL (ref 0–0.2)
PLATELET # BLD AUTO: 199 K/UL (ref 150–450)
PMV BLD AUTO: 9.6 FL (ref 9.4–12.3)
POTASSIUM SERPL-SCNC: 3.7 MMOL/L (ref 3.5–5.1)
PPD, POC: NEGATIVE
RBC # BLD AUTO: 4.06 M/UL (ref 4.23–5.6)
SODIUM SERPL-SCNC: 144 MMOL/L (ref 136–146)
TRIGL SERPL-MCNC: 139 MG/DL (ref 35–150)
TSH W FREE THYROID IF ABNORMAL: 0.84 UIU/ML (ref 0.36–3.74)
VIT B12 SERPL-MCNC: 717 PG/ML (ref 193–986)
VLDLC SERPL CALC-MCNC: 27.8 MG/DL (ref 6–23)
WBC # BLD AUTO: 8.1 K/UL (ref 4.3–11.1)

## 2024-03-13 PROCEDURE — 94761 N-INVAS EAR/PLS OXIMETRY MLT: CPT

## 2024-03-13 PROCEDURE — G0378 HOSPITAL OBSERVATION PER HR: HCPCS

## 2024-03-13 PROCEDURE — 6360000002 HC RX W HCPCS: Performed by: INTERNAL MEDICINE

## 2024-03-13 PROCEDURE — 87206 SMEAR FLUORESCENT/ACID STAI: CPT

## 2024-03-13 PROCEDURE — 84443 ASSAY THYROID STIM HORMONE: CPT

## 2024-03-13 PROCEDURE — 96366 THER/PROPH/DIAG IV INF ADDON: CPT

## 2024-03-13 PROCEDURE — 85027 COMPLETE CBC AUTOMATED: CPT

## 2024-03-13 PROCEDURE — 2580000003 HC RX 258: Performed by: PHYSICIAN ASSISTANT

## 2024-03-13 PROCEDURE — 82306 VITAMIN D 25 HYDROXY: CPT

## 2024-03-13 PROCEDURE — 87186 SC STD MICRODIL/AGAR DIL: CPT

## 2024-03-13 PROCEDURE — 97530 THERAPEUTIC ACTIVITIES: CPT

## 2024-03-13 PROCEDURE — 94640 AIRWAY INHALATION TREATMENT: CPT

## 2024-03-13 PROCEDURE — 6360000002 HC RX W HCPCS: Performed by: PHYSICIAN ASSISTANT

## 2024-03-13 PROCEDURE — 87205 SMEAR GRAM STAIN: CPT

## 2024-03-13 PROCEDURE — 36415 COLL VENOUS BLD VENIPUNCTURE: CPT

## 2024-03-13 PROCEDURE — 80048 BASIC METABOLIC PNL TOTAL CA: CPT

## 2024-03-13 PROCEDURE — 99232 SBSQ HOSP IP/OBS MODERATE 35: CPT | Performed by: INTERNAL MEDICINE

## 2024-03-13 PROCEDURE — 2580000003 HC RX 258: Performed by: INTERNAL MEDICINE

## 2024-03-13 PROCEDURE — 80061 LIPID PANEL: CPT

## 2024-03-13 PROCEDURE — 97165 OT EVAL LOW COMPLEX 30 MIN: CPT

## 2024-03-13 PROCEDURE — 87070 CULTURE OTHR SPECIMN AEROBIC: CPT

## 2024-03-13 PROCEDURE — 87116 MYCOBACTERIA CULTURE: CPT

## 2024-03-13 PROCEDURE — 82607 VITAMIN B-12: CPT

## 2024-03-13 PROCEDURE — 97161 PT EVAL LOW COMPLEX 20 MIN: CPT

## 2024-03-13 PROCEDURE — 6370000000 HC RX 637 (ALT 250 FOR IP): Performed by: PHYSICIAN ASSISTANT

## 2024-03-13 PROCEDURE — 82746 ASSAY OF FOLIC ACID SERUM: CPT

## 2024-03-13 PROCEDURE — 87077 CULTURE AEROBIC IDENTIFY: CPT

## 2024-03-13 PROCEDURE — 96372 THER/PROPH/DIAG INJ SC/IM: CPT

## 2024-03-13 RX ORDER — ALPRAZOLAM 0.5 MG/1
1 TABLET ORAL 2 TIMES DAILY PRN
Status: DISCONTINUED | OUTPATIENT
Start: 2024-03-13 | End: 2024-03-27 | Stop reason: HOSPADM

## 2024-03-13 RX ADMIN — ALBUTEROL SULFATE 2.5 MG: 2.5 SOLUTION RESPIRATORY (INHALATION) at 08:32

## 2024-03-13 RX ADMIN — SODIUM CHLORIDE, PRESERVATIVE FREE 10 ML: 5 INJECTION INTRAVENOUS at 21:47

## 2024-03-13 RX ADMIN — OLANZAPINE 5 MG: 5 TABLET, FILM COATED ORAL at 21:46

## 2024-03-13 RX ADMIN — ATORVASTATIN CALCIUM 40 MG: 40 TABLET, FILM COATED ORAL at 21:46

## 2024-03-13 RX ADMIN — DIVALPROEX SODIUM 1000 MG: 500 TABLET, DELAYED RELEASE ORAL at 21:46

## 2024-03-13 RX ADMIN — PANTOPRAZOLE SODIUM 40 MG: 40 TABLET, DELAYED RELEASE ORAL at 08:30

## 2024-03-13 RX ADMIN — SODIUM CHLORIDE, PRESERVATIVE FREE 10 ML: 5 INJECTION INTRAVENOUS at 08:31

## 2024-03-13 RX ADMIN — ALBUTEROL SULFATE 2.5 MG: 2.5 SOLUTION RESPIRATORY (INHALATION) at 12:07

## 2024-03-13 RX ADMIN — ASPIRIN 81 MG: 81 TABLET, COATED ORAL at 08:30

## 2024-03-13 RX ADMIN — MOMETASONE FUROATE AND FORMOTEROL FUMARATE DIHYDRATE 2 PUFF: 100; 5 AEROSOL RESPIRATORY (INHALATION) at 08:33

## 2024-03-13 RX ADMIN — ENOXAPARIN SODIUM 40 MG: 100 INJECTION SUBCUTANEOUS at 08:30

## 2024-03-13 RX ADMIN — CEFTRIAXONE SODIUM 2000 MG: 2 INJECTION, POWDER, FOR SOLUTION INTRAMUSCULAR; INTRAVENOUS at 11:05

## 2024-03-13 RX ADMIN — PANTOPRAZOLE SODIUM 40 MG: 40 TABLET, DELAYED RELEASE ORAL at 21:46

## 2024-03-13 RX ADMIN — DICYCLOMINE HYDROCHLORIDE 20 MG: 10 CAPSULE ORAL at 08:29

## 2024-03-13 RX ADMIN — ALBUTEROL SULFATE 2.5 MG: 2.5 SOLUTION RESPIRATORY (INHALATION) at 16:15

## 2024-03-13 RX ADMIN — Medication 4 ML: at 08:32

## 2024-03-13 RX ADMIN — LEVOTHYROXINE SODIUM 50 MCG: 0.05 TABLET ORAL at 05:39

## 2024-03-13 RX ADMIN — TOPIRAMATE 50 MG: 25 TABLET, FILM COATED ORAL at 21:46

## 2024-03-13 RX ADMIN — AZITHROMYCIN MONOHYDRATE 500 MG: 500 INJECTION, POWDER, LYOPHILIZED, FOR SOLUTION INTRAVENOUS at 11:40

## 2024-03-13 RX ADMIN — DICYCLOMINE HYDROCHLORIDE 20 MG: 10 CAPSULE ORAL at 21:46

## 2024-03-13 RX ADMIN — TOPIRAMATE 50 MG: 25 TABLET, FILM COATED ORAL at 08:30

## 2024-03-13 RX ADMIN — DIVALPROEX SODIUM 1000 MG: 500 TABLET, DELAYED RELEASE ORAL at 08:30

## 2024-03-13 RX ADMIN — DOCUSATE SODIUM 50 MG: 50 LIQUID ORAL at 08:30

## 2024-03-13 ASSESSMENT — PAIN SCALES - GENERAL
PAINLEVEL_OUTOF10: 0

## 2024-03-13 NOTE — THERAPY EVALUATION
ACUTE PHYSICAL THERAPY GOALS:   (Developed with and agreed upon by patient and/or caregiver.)    (1.) Katarina Zuñiga  will move from supine to sit and sit to supine , scoot up and down, and roll side to side with STAND BY ASSIST within 7 treatment day(s).    (2.) Katarina Zuñiga will transfer from bed to chair and chair to bed with STAND BY ASSIST using the least restrictive device within 7 treatment day(s).    (3.) Katarina Zuñiga will ambulate with STAND BY ASSIST for 150 feet with the least restrictive device within 7 treatment day(s).   (4.) Katarina Zuñiga will perform standing static and dynamic balance activities x 5 minutes with STAND BY ASSIST to improve safety within 7 treatment day(s).  (5.) Katarina Zuñiga will perform therapeutic exercises x 10 min for HEP with STAND BY ASSIST to improve strength, endurance, and functional mobility within 7 treatment day(s).      PHYSICAL THERAPY Initial Assessment, Daily Note, and AM  (Link to Caseload Tracking: PT Visit Days : 1  Acknowledge Orders  Time In/Out  PT Charge Capture  Rehab Caseload Tracker    Katarina Zuñiga is a 58 y.o. male   PRIMARY DIAGNOSIS: Elevated troponin  Elevated troponin [R79.89]       Reason for Referral: Generalized Muscle Weakness (M62.81)  Difficulty in walking, Not elsewhere classified (R26.2)  Observation: Payor: [employer]ICARE / Plan: [facility] MEDICARE-ADVANTAGE PPO / Product Type: Medicare /     ASSESSMENT:     REHAB RECOMMENDATIONS:   Recommendation to date pending progress:  Setting:  Short-term Rehab    Equipment:    To Be Determined     ASSESSMENT:  Mr. Zuñiga is a 58 year old male who presents with progressive weakness and is admitted with elevated troponin and pneumonia. At baseline he lives home with wife and 3 children and performs mobility MI using a 2WW. Patient reports limited activity tolerance/mobility even at baseline, with wife assisting with meals and

## 2024-03-14 ENCOUNTER — APPOINTMENT (OUTPATIENT)
Dept: MRI IMAGING | Age: 59
DRG: 321 | End: 2024-03-14
Attending: INTERNAL MEDICINE
Payer: MEDICARE

## 2024-03-14 LAB
ANION GAP SERPL CALC-SCNC: 4 MMOL/L (ref 2–11)
BUN SERPL-MCNC: 10 MG/DL (ref 8–23)
CALCIUM SERPL-MCNC: 9.3 MG/DL (ref 8.3–10.4)
CHLORIDE SERPL-SCNC: 114 MMOL/L (ref 103–113)
CO2 SERPL-SCNC: 23 MMOL/L (ref 21–32)
CREAT SERPL-MCNC: 0.7 MG/DL (ref 0.8–1.5)
ERYTHROCYTE [DISTWIDTH] IN BLOOD BY AUTOMATED COUNT: 14.8 % (ref 11.9–14.6)
GLUCOSE SERPL-MCNC: 84 MG/DL (ref 65–100)
HCT VFR BLD AUTO: 39.8 % (ref 41.1–50.3)
HGB BLD-MCNC: 12.7 G/DL (ref 13.6–17.2)
MCH RBC QN AUTO: 30.6 PG (ref 26.1–32.9)
MCHC RBC AUTO-ENTMCNC: 31.9 G/DL (ref 31.4–35)
MCV RBC AUTO: 95.9 FL (ref 82–102)
MM INDURATION, POC: 0 MM (ref 0–5)
NRBC # BLD: 0 K/UL (ref 0–0.2)
PLATELET # BLD AUTO: 221 K/UL (ref 150–450)
PMV BLD AUTO: 9.6 FL (ref 9.4–12.3)
POTASSIUM SERPL-SCNC: 4 MMOL/L (ref 3.5–5.1)
PPD, POC: NEGATIVE
RBC # BLD AUTO: 4.15 M/UL (ref 4.23–5.6)
SODIUM SERPL-SCNC: 141 MMOL/L (ref 136–146)
WBC # BLD AUTO: 8.8 K/UL (ref 4.3–11.1)

## 2024-03-14 PROCEDURE — 97530 THERAPEUTIC ACTIVITIES: CPT

## 2024-03-14 PROCEDURE — 6360000002 HC RX W HCPCS: Performed by: INTERNAL MEDICINE

## 2024-03-14 PROCEDURE — 36415 COLL VENOUS BLD VENIPUNCTURE: CPT

## 2024-03-14 PROCEDURE — 94640 AIRWAY INHALATION TREATMENT: CPT

## 2024-03-14 PROCEDURE — 2140000000 HC CCU INTERMEDIATE R&B

## 2024-03-14 PROCEDURE — 2580000003 HC RX 258: Performed by: PHYSICIAN ASSISTANT

## 2024-03-14 PROCEDURE — 2580000003 HC RX 258: Performed by: INTERNAL MEDICINE

## 2024-03-14 PROCEDURE — 6370000000 HC RX 637 (ALT 250 FOR IP): Performed by: NURSE PRACTITIONER

## 2024-03-14 PROCEDURE — 94760 N-INVAS EAR/PLS OXIMETRY 1: CPT

## 2024-03-14 PROCEDURE — 97112 NEUROMUSCULAR REEDUCATION: CPT

## 2024-03-14 PROCEDURE — 70553 MRI BRAIN STEM W/O & W/DYE: CPT

## 2024-03-14 PROCEDURE — 6370000000 HC RX 637 (ALT 250 FOR IP): Performed by: FAMILY MEDICINE

## 2024-03-14 PROCEDURE — 6370000000 HC RX 637 (ALT 250 FOR IP): Performed by: PHYSICIAN ASSISTANT

## 2024-03-14 PROCEDURE — 94669 MECHANICAL CHEST WALL OSCILL: CPT

## 2024-03-14 PROCEDURE — 99232 SBSQ HOSP IP/OBS MODERATE 35: CPT | Performed by: INTERNAL MEDICINE

## 2024-03-14 PROCEDURE — 85027 COMPLETE CBC AUTOMATED: CPT

## 2024-03-14 PROCEDURE — 6360000002 HC RX W HCPCS: Performed by: PHYSICIAN ASSISTANT

## 2024-03-14 PROCEDURE — 92610 EVALUATE SWALLOWING FUNCTION: CPT

## 2024-03-14 PROCEDURE — 6360000004 HC RX CONTRAST MEDICATION: Performed by: FAMILY MEDICINE

## 2024-03-14 PROCEDURE — 94761 N-INVAS EAR/PLS OXIMETRY MLT: CPT

## 2024-03-14 PROCEDURE — A9579 GAD-BASE MR CONTRAST NOS,1ML: HCPCS | Performed by: FAMILY MEDICINE

## 2024-03-14 PROCEDURE — 97535 SELF CARE MNGMENT TRAINING: CPT

## 2024-03-14 PROCEDURE — 80048 BASIC METABOLIC PNL TOTAL CA: CPT

## 2024-03-14 RX ORDER — GUAIFENESIN/DEXTROMETHORPHAN 100-10MG/5
5 SYRUP ORAL EVERY 4 HOURS PRN
Status: DISCONTINUED | OUTPATIENT
Start: 2024-03-14 | End: 2024-03-27 | Stop reason: HOSPADM

## 2024-03-14 RX ORDER — TRAMADOL HYDROCHLORIDE 50 MG/1
50 TABLET ORAL EVERY 6 HOURS PRN
Status: DISCONTINUED | OUTPATIENT
Start: 2024-03-14 | End: 2024-03-27 | Stop reason: HOSPADM

## 2024-03-14 RX ADMIN — DICYCLOMINE HYDROCHLORIDE 20 MG: 10 CAPSULE ORAL at 21:09

## 2024-03-14 RX ADMIN — ALPRAZOLAM 1 MG: 0.5 TABLET ORAL at 15:46

## 2024-03-14 RX ADMIN — DOCUSATE SODIUM 50 MG: 50 LIQUID ORAL at 08:54

## 2024-03-14 RX ADMIN — ALBUTEROL SULFATE 2.5 MG: 2.5 SOLUTION RESPIRATORY (INHALATION) at 07:50

## 2024-03-14 RX ADMIN — DIVALPROEX SODIUM 1000 MG: 500 TABLET, DELAYED RELEASE ORAL at 21:09

## 2024-03-14 RX ADMIN — ENOXAPARIN SODIUM 40 MG: 100 INJECTION SUBCUTANEOUS at 08:52

## 2024-03-14 RX ADMIN — ASPIRIN 81 MG: 81 TABLET, COATED ORAL at 08:53

## 2024-03-14 RX ADMIN — TRAMADOL HYDROCHLORIDE 50 MG: 50 TABLET ORAL at 00:34

## 2024-03-14 RX ADMIN — TOPIRAMATE 50 MG: 25 TABLET, FILM COATED ORAL at 21:09

## 2024-03-14 RX ADMIN — GUAIFENESIN SYRUP AND DEXTROMETHORPHAN 5 ML: 100; 10 SYRUP ORAL at 22:50

## 2024-03-14 RX ADMIN — PANTOPRAZOLE SODIUM 40 MG: 40 TABLET, DELAYED RELEASE ORAL at 21:09

## 2024-03-14 RX ADMIN — LEVOTHYROXINE SODIUM 50 MCG: 0.05 TABLET ORAL at 05:28

## 2024-03-14 RX ADMIN — ALBUTEROL SULFATE 2.5 MG: 2.5 SOLUTION RESPIRATORY (INHALATION) at 12:19

## 2024-03-14 RX ADMIN — OLANZAPINE 5 MG: 5 TABLET, FILM COATED ORAL at 21:09

## 2024-03-14 RX ADMIN — PANTOPRAZOLE SODIUM 40 MG: 40 TABLET, DELAYED RELEASE ORAL at 08:53

## 2024-03-14 RX ADMIN — AZITHROMYCIN MONOHYDRATE 500 MG: 500 INJECTION, POWDER, LYOPHILIZED, FOR SOLUTION INTRAVENOUS at 12:01

## 2024-03-14 RX ADMIN — CEFTRIAXONE SODIUM 2000 MG: 2 INJECTION, POWDER, FOR SOLUTION INTRAMUSCULAR; INTRAVENOUS at 10:57

## 2024-03-14 RX ADMIN — TOPIRAMATE 50 MG: 25 TABLET, FILM COATED ORAL at 08:53

## 2024-03-14 RX ADMIN — GADOTERIDOL 15 ML: 279.3 INJECTION, SOLUTION INTRAVENOUS at 16:45

## 2024-03-14 RX ADMIN — SODIUM CHLORIDE, PRESERVATIVE FREE 10 ML: 5 INJECTION INTRAVENOUS at 08:54

## 2024-03-14 RX ADMIN — TRAMADOL HYDROCHLORIDE 50 MG: 50 TABLET ORAL at 09:05

## 2024-03-14 RX ADMIN — DICYCLOMINE HYDROCHLORIDE 20 MG: 10 CAPSULE ORAL at 08:53

## 2024-03-14 RX ADMIN — SODIUM CHLORIDE, PRESERVATIVE FREE 10 ML: 5 INJECTION INTRAVENOUS at 19:37

## 2024-03-14 RX ADMIN — TRAMADOL HYDROCHLORIDE 50 MG: 50 TABLET ORAL at 18:04

## 2024-03-14 RX ADMIN — MOMETASONE FUROATE AND FORMOTEROL FUMARATE DIHYDRATE 2 PUFF: 100; 5 AEROSOL RESPIRATORY (INHALATION) at 07:51

## 2024-03-14 RX ADMIN — ATORVASTATIN CALCIUM 40 MG: 40 TABLET, FILM COATED ORAL at 21:09

## 2024-03-14 RX ADMIN — DIVALPROEX SODIUM 1000 MG: 500 TABLET, DELAYED RELEASE ORAL at 08:53

## 2024-03-14 RX ADMIN — IPRATROPIUM BROMIDE AND ALBUTEROL SULFATE 1 DOSE: 2.5; .5 SOLUTION RESPIRATORY (INHALATION) at 18:01

## 2024-03-14 ASSESSMENT — PAIN SCALES - GENERAL
PAINLEVEL_OUTOF10: 0
PAINLEVEL_OUTOF10: 6
PAINLEVEL_OUTOF10: 5
PAINLEVEL_OUTOF10: 6
PAINLEVEL_OUTOF10: 0

## 2024-03-14 ASSESSMENT — PAIN DESCRIPTION - LOCATION
LOCATION: GENERALIZED
LOCATION: GENERALIZED

## 2024-03-14 ASSESSMENT — PAIN - FUNCTIONAL ASSESSMENT: PAIN_FUNCTIONAL_ASSESSMENT: ACTIVITIES ARE NOT PREVENTED

## 2024-03-15 PROBLEM — R90.89 ABNORMAL BRAIN MRI: Status: ACTIVE | Noted: 2024-03-15

## 2024-03-15 LAB
25(OH)D3 SERPL-MCNC: 45.6 NG/ML (ref 30–100)
ANION GAP SERPL CALC-SCNC: 6 MMOL/L (ref 2–11)
BUN SERPL-MCNC: 12 MG/DL (ref 8–23)
CALCIUM SERPL-MCNC: 9.3 MG/DL (ref 8.3–10.4)
CHLORIDE SERPL-SCNC: 111 MMOL/L (ref 103–113)
CO2 SERPL-SCNC: 23 MMOL/L (ref 21–32)
CREAT SERPL-MCNC: 0.7 MG/DL (ref 0.8–1.5)
ERYTHROCYTE [DISTWIDTH] IN BLOOD BY AUTOMATED COUNT: 14.7 % (ref 11.9–14.6)
EST. AVERAGE GLUCOSE BLD GHB EST-MCNC: 117 MG/DL
GLUCOSE SERPL-MCNC: 100 MG/DL (ref 65–100)
HBA1C MFR BLD: 5.7 % (ref 4.8–5.6)
HCT VFR BLD AUTO: 40 % (ref 41.1–50.3)
HGB BLD-MCNC: 12.7 G/DL (ref 13.6–17.2)
MAGNESIUM SERPL-MCNC: 2.2 MG/DL (ref 1.8–2.4)
MCH RBC QN AUTO: 30.5 PG (ref 26.1–32.9)
MCHC RBC AUTO-ENTMCNC: 31.8 G/DL (ref 31.4–35)
MCV RBC AUTO: 96.2 FL (ref 82–102)
NRBC # BLD: 0 K/UL (ref 0–0.2)
PLATELET # BLD AUTO: 237 K/UL (ref 150–450)
PMV BLD AUTO: 9.2 FL (ref 9.4–12.3)
POTASSIUM SERPL-SCNC: 4.2 MMOL/L (ref 3.5–5.1)
RBC # BLD AUTO: 4.16 M/UL (ref 4.23–5.6)
SODIUM SERPL-SCNC: 140 MMOL/L (ref 136–146)
T4 FREE SERPL-MCNC: 1.1 NG/DL (ref 0.78–1.46)
WBC # BLD AUTO: 8.4 K/UL (ref 4.3–11.1)

## 2024-03-15 PROCEDURE — 6370000000 HC RX 637 (ALT 250 FOR IP): Performed by: PHYSICIAN ASSISTANT

## 2024-03-15 PROCEDURE — 83735 ASSAY OF MAGNESIUM: CPT

## 2024-03-15 PROCEDURE — 94760 N-INVAS EAR/PLS OXIMETRY 1: CPT

## 2024-03-15 PROCEDURE — 85027 COMPLETE CBC AUTOMATED: CPT

## 2024-03-15 PROCEDURE — 97530 THERAPEUTIC ACTIVITIES: CPT

## 2024-03-15 PROCEDURE — 92526 ORAL FUNCTION THERAPY: CPT

## 2024-03-15 PROCEDURE — 82172 ASSAY OF APOLIPOPROTEIN: CPT

## 2024-03-15 PROCEDURE — 80048 BASIC METABOLIC PNL TOTAL CA: CPT

## 2024-03-15 PROCEDURE — 83036 HEMOGLOBIN GLYCOSYLATED A1C: CPT

## 2024-03-15 PROCEDURE — 82306 VITAMIN D 25 HYDROXY: CPT

## 2024-03-15 PROCEDURE — 94667 MNPJ CHEST WALL 1ST: CPT

## 2024-03-15 PROCEDURE — 2140000000 HC CCU INTERMEDIATE R&B

## 2024-03-15 PROCEDURE — 6360000002 HC RX W HCPCS: Performed by: INTERNAL MEDICINE

## 2024-03-15 PROCEDURE — 99222 1ST HOSP IP/OBS MODERATE 55: CPT | Performed by: PSYCHIATRY & NEUROLOGY

## 2024-03-15 PROCEDURE — 2580000003 HC RX 258: Performed by: PHYSICIAN ASSISTANT

## 2024-03-15 PROCEDURE — 6370000000 HC RX 637 (ALT 250 FOR IP): Performed by: INTERNAL MEDICINE

## 2024-03-15 PROCEDURE — 84439 ASSAY OF FREE THYROXINE: CPT

## 2024-03-15 PROCEDURE — 6360000002 HC RX W HCPCS: Performed by: PHYSICIAN ASSISTANT

## 2024-03-15 PROCEDURE — 92507 TX SP LANG VOICE COMM INDIV: CPT

## 2024-03-15 PROCEDURE — 6370000000 HC RX 637 (ALT 250 FOR IP): Performed by: NURSE PRACTITIONER

## 2024-03-15 PROCEDURE — 2580000003 HC RX 258: Performed by: INTERNAL MEDICINE

## 2024-03-15 PROCEDURE — 99232 SBSQ HOSP IP/OBS MODERATE 35: CPT | Performed by: INTERNAL MEDICINE

## 2024-03-15 PROCEDURE — 6370000000 HC RX 637 (ALT 250 FOR IP): Performed by: FAMILY MEDICINE

## 2024-03-15 PROCEDURE — 36415 COLL VENOUS BLD VENIPUNCTURE: CPT

## 2024-03-15 RX ORDER — ATORVASTATIN CALCIUM 80 MG/1
80 TABLET, FILM COATED ORAL NIGHTLY
Status: DISCONTINUED | OUTPATIENT
Start: 2024-03-15 | End: 2024-03-27 | Stop reason: HOSPADM

## 2024-03-15 RX ORDER — ACETYLCYSTEINE 200 MG/ML
600 SOLUTION ORAL; RESPIRATORY (INHALATION)
Status: DISCONTINUED | OUTPATIENT
Start: 2024-03-15 | End: 2024-03-22

## 2024-03-15 RX ADMIN — ALPRAZOLAM 1 MG: 0.5 TABLET ORAL at 16:40

## 2024-03-15 RX ADMIN — OLANZAPINE 5 MG: 5 TABLET, FILM COATED ORAL at 20:28

## 2024-03-15 RX ADMIN — TOPIRAMATE 50 MG: 25 TABLET, FILM COATED ORAL at 08:11

## 2024-03-15 RX ADMIN — DICYCLOMINE HYDROCHLORIDE 20 MG: 10 CAPSULE ORAL at 20:28

## 2024-03-15 RX ADMIN — DIVALPROEX SODIUM 1000 MG: 500 TABLET, DELAYED RELEASE ORAL at 20:28

## 2024-03-15 RX ADMIN — ATORVASTATIN CALCIUM 80 MG: 80 TABLET, FILM COATED ORAL at 20:28

## 2024-03-15 RX ADMIN — PANTOPRAZOLE SODIUM 40 MG: 40 TABLET, DELAYED RELEASE ORAL at 20:28

## 2024-03-15 RX ADMIN — GUAIFENESIN SYRUP AND DEXTROMETHORPHAN 5 ML: 100; 10 SYRUP ORAL at 03:26

## 2024-03-15 RX ADMIN — DICYCLOMINE HYDROCHLORIDE 20 MG: 10 CAPSULE ORAL at 08:12

## 2024-03-15 RX ADMIN — TRAMADOL HYDROCHLORIDE 50 MG: 50 TABLET ORAL at 03:23

## 2024-03-15 RX ADMIN — AZITHROMYCIN MONOHYDRATE 500 MG: 500 INJECTION, POWDER, LYOPHILIZED, FOR SOLUTION INTRAVENOUS at 11:54

## 2024-03-15 RX ADMIN — GUAIFENESIN SYRUP AND DEXTROMETHORPHAN 5 ML: 100; 10 SYRUP ORAL at 18:33

## 2024-03-15 RX ADMIN — SODIUM CHLORIDE, PRESERVATIVE FREE 10 ML: 5 INJECTION INTRAVENOUS at 07:31

## 2024-03-15 RX ADMIN — TOPIRAMATE 50 MG: 25 TABLET, FILM COATED ORAL at 20:28

## 2024-03-15 RX ADMIN — TRAMADOL HYDROCHLORIDE 50 MG: 50 TABLET ORAL at 11:23

## 2024-03-15 RX ADMIN — ENOXAPARIN SODIUM 40 MG: 100 INJECTION SUBCUTANEOUS at 08:12

## 2024-03-15 RX ADMIN — LEVOTHYROXINE SODIUM 50 MCG: 0.05 TABLET ORAL at 06:30

## 2024-03-15 RX ADMIN — PANTOPRAZOLE SODIUM 40 MG: 40 TABLET, DELAYED RELEASE ORAL at 08:12

## 2024-03-15 RX ADMIN — CEFTRIAXONE SODIUM 2000 MG: 2 INJECTION, POWDER, FOR SOLUTION INTRAMUSCULAR; INTRAVENOUS at 11:18

## 2024-03-15 RX ADMIN — DIVALPROEX SODIUM 1000 MG: 500 TABLET, DELAYED RELEASE ORAL at 08:11

## 2024-03-15 RX ADMIN — ASPIRIN 81 MG: 81 TABLET, COATED ORAL at 08:12

## 2024-03-15 RX ADMIN — SODIUM CHLORIDE, PRESERVATIVE FREE 5 ML: 5 INJECTION INTRAVENOUS at 20:29

## 2024-03-15 ASSESSMENT — PAIN SCALES - GENERAL
PAINLEVEL_OUTOF10: 0
PAINLEVEL_OUTOF10: 5
PAINLEVEL_OUTOF10: 0
PAINLEVEL_OUTOF10: 6

## 2024-03-15 ASSESSMENT — PAIN DESCRIPTION - ORIENTATION: ORIENTATION: MID

## 2024-03-15 ASSESSMENT — PAIN DESCRIPTION - LOCATION
LOCATION: GENERALIZED
LOCATION: GENERALIZED;BACK

## 2024-03-15 ASSESSMENT — PAIN DESCRIPTION - DESCRIPTORS: DESCRIPTORS: ACHING

## 2024-03-15 NOTE — PLAN OF CARE
Problem: Respiratory - Adult  Goal: Achieves optimal ventilation and oxygenation  Outcome: Progressing  Flowsheets (Taken 3/15/2024 2217)  Achieves optimal ventilation and oxygenation:   Assess for changes in respiratory status   Position to facilitate oxygenation and minimize respiratory effort   Oxygen supplementation based on oxygen saturation or arterial blood gases   Assess for changes in mentation and behavior   Encourage broncho-pulmonary hygiene including cough, deep breathe, incentive spirometry   Assess and instruct to report shortness of breath or any respiratory difficulty   Respiratory therapy support as indicated

## 2024-03-15 NOTE — PLAN OF CARE
Problem: Discharge Planning  Goal: Discharge to home or other facility with appropriate resources  Outcome: Progressing     Problem: Safety - Adult  Goal: Free from fall injury  Outcome: Progressing     Problem: Skin/Tissue Integrity  Goal: Absence of new skin breakdown  Description: 1.  Monitor for areas of redness and/or skin breakdown  2.  Assess vascular access sites hourly  3.  Every 4-6 hours minimum:  Change oxygen saturation probe site  4.  Every 4-6 hours:  If on nasal continuous positive airway pressure, respiratory therapy assess nares and determine need for appliance change or resting period.  Outcome: Progressing     Problem: Pain  Goal: Verbalizes/displays adequate comfort level or baseline comfort level  Outcome: Progressing     Problem: Chronic Conditions and Co-morbidities  Goal: Patient's chronic conditions and co-morbidity symptoms are monitored and maintained or improved  Outcome: Progressing

## 2024-03-15 NOTE — CONSULTS
Comprehensive Nutrition Assessment    Type and Reason for Visit: Initial, Consult  Oral Nutrition Supplements (Cardiology) - RN generated  \" Pt has decreased appetite at home per wife, pt has lost strength and developed sacral wound.\"    Nutrition Recommendations/Plan:   Meals and Snacks:  Diet: Liberalize to 4 CHO choices  Nutrition Supplement Therapy:  Medical food supplement therapy:  Initiate Glucerna Shake once per day (this provides 220 kcal and 10 grams protein per bottle) and Pa twice per day (this provides 90 kcal and 2.5 grams protein per packet)     Malnutrition Assessment:  Malnutrition Status: At risk for malnutrition (Comment) (decreased ability to prepare meals, recall PTA, new wound)    No wasting  Nutrition Assessment:  Nutrition History: Wife provides history. She states that patient has a tremor and uses a walker thus difficulty preparing things to eat. She states if she is home for breakfast he will eat a good breakfast. Otherwise he may just drink and Ensure. She tries to prepare him something for lunch before she goes to work, if not he will have another Ensure and a pack of crackers or sandwich.. She usually cooks a full dinner or they will eat out. They both deny weight changes.     Do You Have Any Cultural, Church, or Ethnic Food Preferences?: No   Weight History: 3/17/23 163#, 4/3/23 168#, 11/6/23 182#, 2/12/24 173#  Nutrition Background:   Wound Type:  (sacral wound per notes, WC assessment pending)   PMH significant for MI, CAD< COPD, CHF, HLD, TIA, DM. He present to outside ER with worsening weakness and SOB.  He was transferred to [facility] for cardiology with elevated troponin.   Nutrition Interval:  Patient reclined in bed. He endorses at good appetite. He ate ~75% protein portion and all of mashed potatoes. He did not eat green beans as they were not prepare the way he likes. Discussed starting nutrition supplement in case he does not eat well for a meal. Also discussed wound healing 
Consult    Patient: Katarina Zuñiga MRN: [id number]     YOB: 1965  Age: 58 y.o.  Sex: male      Subjective:      Katarina Zuñiga is a 58 y.o. male who is being seen for left-sided weakness.    The patient has had progressive generalized weakness but has also had progressive left-sided sensorimotor deficits.  Over the last few weeks he has been having difficulty getting in and out of the car because he cannot use his left side to pull them up into the vehicle or assist him getting out of the vehicle.  He feels as though his symptoms have been progressive.    Otherwise the patient is hospitalized in the setting of coronary artery disease, pneumonia, tracheomalacia, hypothyroidism, and failure to thrive.    Past Medical History:   Diagnosis Date    Abnormal liver function     Acute myocardial infarction (HCC) 4/29/2016    Anxiety     managed with medication    Arthritis     generalized    Asthma     managed with inhalers and jet neb    Autoimmune disease ([facility])     Fibromyalgia    AVN of femur ([facility]) 4/18/2016    Bulging lumbar disc     x3 states patient     CAD (coronary artery disease) 5/16/2013    STEMI-1 stent    Chest pain 4/29/2016    Chronic anxiety 4/29/2016    Chronic obstructive pulmonary disease (HCC)      Chronic Bronchitis    Chronic pain     generalized , See Dr. Berg    Chronic systolic heart failure ([facility]) 4/29/2016    Claustrophobia     mild    Congestive heart failure ([facility])     EF 60% 6/2016    Coronary atherosclerosis of native coronary vessel 4/29/2016    Depression     managed with medication     Extradural cyst of spine     at the base of neck    GERD (gastroesophageal reflux disease)     managed with medication     Heart failure (HCC)     managed with medication     History of rectal bleeding     due to Plavix, discontinued with no further difficulty    Hyperlipidemia     no present treatment     Hypertension     managed with medication     MRSA (methicillin 
previous visit.    MICRO: No results for input(s): \"CULTURE\" in the last 72 hours.  No results for input(s): \"COVID19\" in the last 72 hours.  Assessment and Plan:  (Medical Decision Making)   Principal Problem:    Elevated troponin  Plan: per cardiology  Active Problems:    Pneumonia of left lower lobe due to infectious organism  Plan: possible, most of his respiratory symptoms sound chronic. He has tracheomalacia and sounds like he probably does not have COPD with no significant smoking history. He is chronically on Breo and had some mild peripheral eosinophilia and possible he has asthma + tracheomalacia. He has chronic mucus plugging and sputum production and with his general weakness and debility he could be considered for aspiration, but there's not much history or concern for aspiration from him or family. Check sputum, airway clearance measures, CAP coverage for now.     Acquired tracheomalacia  Plan: albuterol/3% saline nebs BID, flutter. Sputum culture    Mucus plugging of bronchi  Plan: as above    Failure to thrive in adult  Plan: seems unlikely related to pulmonary system, progressive debility and may need rehab or nursing home.     Full Code    More than 50% of the time documented was spent in face-to-face contact with the patient and in the care of the patient on the floor/unit where the patient is located.    Thank you very much for this referral.  We appreciate the opportunity to participate in this patient's care.  Will follow along with above stated plan.    Rtiu Cardona MD    
g  17 g Oral Daily PRN    atorvastatin (LIPITOR) tablet 40 mg  40 mg Oral Nightly    enoxaparin (LOVENOX) injection 40 mg  40 mg SubCUTAneous Daily    ALPRAZolam (XANAX) tablet 1 mg  1 mg Oral 4x Daily PRN    carvedilol (COREG) tablet 3.125 mg  3.125 mg Oral Daily    cetirizine (ZYRTEC) tablet 10 mg  10 mg Oral Daily PRN    dicyclomine (BENTYL) capsule 20 mg  20 mg Oral BID    divalproex (DEPAKOTE) DR tablet 1,000 mg  1,000 mg Oral BID    docusate (COLACE) 50 MG/5ML liquid 50 mg  50 mg Oral Daily    fluticasone (FLONASE) 50 MCG/ACT nasal spray 2 spray  2 spray Nasal Daily PRN    mometasone-formoterol (DULERA) 100-5 MCG/ACT inhaler 2 puff  2 puff Inhalation BID RT    HYDROcodone-acetaminophen (NORCO)  MG per tablet 1 tablet  1 tablet Oral Q6H PRN    ipratropium 0.5 mg-albuterol 2.5 mg (DUONEB) nebulizer solution 1 Dose  1 Dose Inhalation Q6H PRN    levothyroxine (SYNTHROID) tablet 50 mcg  50 mcg Oral QAM AC    OLANZapine (ZYPREXA) tablet 5 mg  5 mg Oral Nightly    pantoprazole (PROTONIX) tablet 40 mg  40 mg Oral BID    topiramate (TOPAMAX) tablet 50 mg  50 mg Oral BID    tuberculin injection 5 Units  5 Units IntraDERmal Once    albuterol (PROVENTIL) (2.5 MG/3ML) 0.083% nebulizer solution 2.5 mg  2.5 mg Nebulization 4x Daily RT    sodium chloride (Inhalant) 3 % nebulizer solution 4 mL  4 mL Nebulization BID RT    cefTRIAXone (ROCEPHIN) 2,000 mg in sodium chloride 0.9 % 50 mL IVPB (mini-bag)  2,000 mg IntraVENous Q24H    azithromycin (ZITHROMAX) 500 mg in sodium chloride 0.9 % 250 mL IVPB ([id number])  500 mg IntraVENous Q24H    aspirin EC tablet 81 mg  81 mg Oral Daily    lisinopril (PRINIVIL;ZESTRIL) tablet 5 mg  5 mg Oral Daily       Signed:  Nitza Mancia DO    Part of this note may have been written by using a voice dictation software.  The note has been proof read but may still contain some grammatical/other typographical errors.

## 2024-03-16 PROBLEM — J15.212 PNEUMONIA OF BOTH LOWER LOBES DUE TO METHICILLIN RESISTANT STAPHYLOCOCCUS AUREUS (MRSA) (HCC): Status: ACTIVE | Noted: 2024-03-16

## 2024-03-16 LAB
ANION GAP SERPL CALC-SCNC: 6 MMOL/L (ref 2–11)
BACTERIA SPEC CULT: ABNORMAL
BACTERIA SPEC CULT: ABNORMAL
BUN SERPL-MCNC: 13 MG/DL (ref 8–23)
CALCIUM SERPL-MCNC: 9 MG/DL (ref 8.3–10.4)
CHLORIDE SERPL-SCNC: 112 MMOL/L (ref 103–113)
CO2 SERPL-SCNC: 23 MMOL/L (ref 21–32)
CREAT SERPL-MCNC: 0.7 MG/DL (ref 0.8–1.5)
ECHO BSA: 1.96 M2
ERYTHROCYTE [DISTWIDTH] IN BLOOD BY AUTOMATED COUNT: 14.2 % (ref 11.9–14.6)
GLUCOSE SERPL-MCNC: 90 MG/DL (ref 65–100)
GRAM STN SPEC: ABNORMAL
HCT VFR BLD AUTO: 41.9 % (ref 41.1–50.3)
HGB BLD-MCNC: 13.4 G/DL (ref 13.6–17.2)
MCH RBC QN AUTO: 30.3 PG (ref 26.1–32.9)
MCHC RBC AUTO-ENTMCNC: 32 G/DL (ref 31.4–35)
MCV RBC AUTO: 94.8 FL (ref 82–102)
MM INDURATION, POC: 0 MM (ref 0–5)
NRBC # BLD: 0 K/UL (ref 0–0.2)
PLATELET # BLD AUTO: 246 K/UL (ref 150–450)
PMV BLD AUTO: 9.2 FL (ref 9.4–12.3)
POTASSIUM SERPL-SCNC: 4 MMOL/L (ref 3.5–5.1)
PPD, POC: NEGATIVE
RBC # BLD AUTO: 4.42 M/UL (ref 4.23–5.6)
SERVICE CMNT-IMP: ABNORMAL
SODIUM SERPL-SCNC: 141 MMOL/L (ref 136–146)
WBC # BLD AUTO: 7.4 K/UL (ref 4.3–11.1)

## 2024-03-16 PROCEDURE — 99152 MOD SED SAME PHYS/QHP 5/>YRS: CPT | Performed by: INTERNAL MEDICINE

## 2024-03-16 PROCEDURE — B2111ZZ FLUOROSCOPY OF MULTIPLE CORONARY ARTERIES USING LOW OSMOLAR CONTRAST: ICD-10-PCS | Performed by: INTERNAL MEDICINE

## 2024-03-16 PROCEDURE — 2500000003 HC RX 250 WO HCPCS: Performed by: INTERNAL MEDICINE

## 2024-03-16 PROCEDURE — 2709999900 HC NON-CHARGEABLE SUPPLY: Performed by: INTERNAL MEDICINE

## 2024-03-16 PROCEDURE — A4216 STERILE WATER/SALINE, 10 ML: HCPCS | Performed by: PHYSICIAN ASSISTANT

## 2024-03-16 PROCEDURE — 2140000000 HC CCU INTERMEDIATE R&B

## 2024-03-16 PROCEDURE — C1874 STENT, COATED/COV W/DEL SYS: HCPCS | Performed by: INTERNAL MEDICINE

## 2024-03-16 PROCEDURE — 93458 L HRT ARTERY/VENTRICLE ANGIO: CPT | Performed by: INTERNAL MEDICINE

## 2024-03-16 PROCEDURE — 2580000003 HC RX 258: Performed by: PHYSICIAN ASSISTANT

## 2024-03-16 PROCEDURE — 36415 COLL VENOUS BLD VENIPUNCTURE: CPT

## 2024-03-16 PROCEDURE — C1769 GUIDE WIRE: HCPCS | Performed by: INTERNAL MEDICINE

## 2024-03-16 PROCEDURE — 027034Z DILATION OF CORONARY ARTERY, ONE ARTERY WITH DRUG-ELUTING INTRALUMINAL DEVICE, PERCUTANEOUS APPROACH: ICD-10-PCS | Performed by: INTERNAL MEDICINE

## 2024-03-16 PROCEDURE — C9600 PERC DRUG-EL COR STENT SING: HCPCS | Performed by: INTERNAL MEDICINE

## 2024-03-16 PROCEDURE — 6360000002 HC RX W HCPCS: Performed by: INTERNAL MEDICINE

## 2024-03-16 PROCEDURE — 99232 SBSQ HOSP IP/OBS MODERATE 35: CPT | Performed by: INTERNAL MEDICINE

## 2024-03-16 PROCEDURE — 4A023N7 MEASUREMENT OF CARDIAC SAMPLING AND PRESSURE, LEFT HEART, PERCUTANEOUS APPROACH: ICD-10-PCS | Performed by: INTERNAL MEDICINE

## 2024-03-16 PROCEDURE — 94640 AIRWAY INHALATION TREATMENT: CPT

## 2024-03-16 PROCEDURE — 99153 MOD SED SAME PHYS/QHP EA: CPT | Performed by: INTERNAL MEDICINE

## 2024-03-16 PROCEDURE — 92928 PRQ TCAT PLMT NTRAC ST 1 LES: CPT | Performed by: INTERNAL MEDICINE

## 2024-03-16 PROCEDURE — 6370000000 HC RX 637 (ALT 250 FOR IP): Performed by: PHYSICIAN ASSISTANT

## 2024-03-16 PROCEDURE — 2580000003 HC RX 258: Performed by: INTERNAL MEDICINE

## 2024-03-16 PROCEDURE — 6370000000 HC RX 637 (ALT 250 FOR IP): Performed by: INTERNAL MEDICINE

## 2024-03-16 PROCEDURE — 6360000004 HC RX CONTRAST MEDICATION: Performed by: INTERNAL MEDICINE

## 2024-03-16 PROCEDURE — C1725 CATH, TRANSLUMIN NON-LASER: HCPCS | Performed by: INTERNAL MEDICINE

## 2024-03-16 PROCEDURE — C1887 CATHETER, GUIDING: HCPCS | Performed by: INTERNAL MEDICINE

## 2024-03-16 PROCEDURE — 6370000000 HC RX 637 (ALT 250 FOR IP): Performed by: FAMILY MEDICINE

## 2024-03-16 PROCEDURE — 94760 N-INVAS EAR/PLS OXIMETRY 1: CPT

## 2024-03-16 PROCEDURE — 6360000002 HC RX W HCPCS: Performed by: NURSE PRACTITIONER

## 2024-03-16 PROCEDURE — 6370000000 HC RX 637 (ALT 250 FOR IP): Performed by: NURSE PRACTITIONER

## 2024-03-16 PROCEDURE — 80048 BASIC METABOLIC PNL TOTAL CA: CPT

## 2024-03-16 PROCEDURE — 85027 COMPLETE CBC AUTOMATED: CPT

## 2024-03-16 PROCEDURE — C1894 INTRO/SHEATH, NON-LASER: HCPCS | Performed by: INTERNAL MEDICINE

## 2024-03-16 DEVICE — STENT ONYXNG25038UX ONYX 2.50X38RX
Type: IMPLANTABLE DEVICE | Site: HEART | Status: FUNCTIONAL
Brand: ONYX FRONTIER™

## 2024-03-16 RX ORDER — BIVALIRUDIN 250 MG/5ML
INJECTION, POWDER, LYOPHILIZED, FOR SOLUTION INTRAVENOUS PRN
Status: DISCONTINUED | OUTPATIENT
Start: 2024-03-16 | End: 2024-03-16 | Stop reason: HOSPADM

## 2024-03-16 RX ORDER — HEPARIN SODIUM 200 [USP'U]/100ML
INJECTION, SOLUTION INTRAVENOUS CONTINUOUS PRN
Status: DISCONTINUED | OUTPATIENT
Start: 2024-03-16 | End: 2024-03-16 | Stop reason: HOSPADM

## 2024-03-16 RX ORDER — ASPIRIN 81 MG/1
TABLET, CHEWABLE ORAL PRN
Status: DISCONTINUED | OUTPATIENT
Start: 2024-03-16 | End: 2024-03-16 | Stop reason: HOSPADM

## 2024-03-16 RX ORDER — LIDOCAINE HYDROCHLORIDE 10 MG/ML
INJECTION, SOLUTION INFILTRATION; PERINEURAL PRN
Status: DISCONTINUED | OUTPATIENT
Start: 2024-03-16 | End: 2024-03-16 | Stop reason: HOSPADM

## 2024-03-16 RX ORDER — NITROGLYCERIN 20 MG/100ML
INJECTION INTRAVENOUS PRN
Status: DISCONTINUED | OUTPATIENT
Start: 2024-03-16 | End: 2024-03-16 | Stop reason: HOSPADM

## 2024-03-16 RX ORDER — MIDAZOLAM HYDROCHLORIDE 1 MG/ML
INJECTION INTRAMUSCULAR; INTRAVENOUS PRN
Status: DISCONTINUED | OUTPATIENT
Start: 2024-03-16 | End: 2024-03-16 | Stop reason: HOSPADM

## 2024-03-16 RX ORDER — HYDROCODONE BITARTRATE AND ACETAMINOPHEN 7.5; 325 MG/1; MG/1
1 TABLET ORAL EVERY 6 HOURS PRN
Status: DISCONTINUED | OUTPATIENT
Start: 2024-03-16 | End: 2024-03-27 | Stop reason: HOSPADM

## 2024-03-16 RX ADMIN — SODIUM CHLORIDE, PRESERVATIVE FREE 5 ML: 5 INJECTION INTRAVENOUS at 20:06

## 2024-03-16 RX ADMIN — TOPIRAMATE 50 MG: 25 TABLET, FILM COATED ORAL at 09:52

## 2024-03-16 RX ADMIN — PANTOPRAZOLE SODIUM 40 MG: 40 TABLET, DELAYED RELEASE ORAL at 09:53

## 2024-03-16 RX ADMIN — TRAMADOL HYDROCHLORIDE 50 MG: 50 TABLET ORAL at 15:01

## 2024-03-16 RX ADMIN — DICYCLOMINE HYDROCHLORIDE 20 MG: 10 CAPSULE ORAL at 09:51

## 2024-03-16 RX ADMIN — ALBUTEROL SULFATE 2.5 MG: 2.5 SOLUTION RESPIRATORY (INHALATION) at 07:53

## 2024-03-16 RX ADMIN — TICAGRELOR 90 MG: 90 TABLET ORAL at 20:03

## 2024-03-16 RX ADMIN — OLANZAPINE 5 MG: 5 TABLET, FILM COATED ORAL at 20:04

## 2024-03-16 RX ADMIN — DOCUSATE SODIUM 50 MG: 50 LIQUID ORAL at 09:52

## 2024-03-16 RX ADMIN — ALPRAZOLAM 1 MG: 0.5 TABLET ORAL at 23:57

## 2024-03-16 RX ADMIN — ATORVASTATIN CALCIUM 80 MG: 80 TABLET, FILM COATED ORAL at 20:03

## 2024-03-16 RX ADMIN — PANTOPRAZOLE SODIUM 40 MG: 40 TABLET, DELAYED RELEASE ORAL at 20:03

## 2024-03-16 RX ADMIN — LEVOTHYROXINE SODIUM 50 MCG: 0.05 TABLET ORAL at 05:31

## 2024-03-16 RX ADMIN — HYDROCODONE BITARTRATE AND ACETAMINOPHEN 1 TABLET: 7.5; 325 TABLET ORAL at 20:03

## 2024-03-16 RX ADMIN — Medication 4 ML: at 07:53

## 2024-03-16 RX ADMIN — SODIUM CHLORIDE, PRESERVATIVE FREE 10 ML: 5 INJECTION INTRAVENOUS at 09:53

## 2024-03-16 RX ADMIN — TOPIRAMATE 50 MG: 25 TABLET, FILM COATED ORAL at 20:03

## 2024-03-16 RX ADMIN — VANCOMYCIN HYDROCHLORIDE 2000 MG: 10 INJECTION, POWDER, LYOPHILIZED, FOR SOLUTION INTRAVENOUS at 13:01

## 2024-03-16 RX ADMIN — DICYCLOMINE HYDROCHLORIDE 20 MG: 10 CAPSULE ORAL at 20:04

## 2024-03-16 ASSESSMENT — PAIN SCALES - GENERAL
PAINLEVEL_OUTOF10: 2
PAINLEVEL_OUTOF10: 6
PAINLEVEL_OUTOF10: 0

## 2024-03-16 ASSESSMENT — PAIN DESCRIPTION - ORIENTATION: ORIENTATION: MID

## 2024-03-16 ASSESSMENT — PAIN DESCRIPTION - DESCRIPTORS: DESCRIPTORS: ACHING;DISCOMFORT

## 2024-03-16 ASSESSMENT — PAIN DESCRIPTION - LOCATION: LOCATION: BACK

## 2024-03-16 NOTE — PLAN OF CARE
Problem: Discharge Planning  Goal: Discharge to home or other facility with appropriate resources  Outcome: Progressing  Flowsheets (Taken 3/15/2024 2020)  Discharge to home or other facility with appropriate resources: Identify barriers to discharge with patient and caregiver     Problem: Safety - Adult  Goal: Free from fall injury  Outcome: Progressing  Flowsheets (Taken 3/15/2024 2020)  Free From Fall Injury: Instruct family/caregiver on patient safety     Problem: Skin/Tissue Integrity  Goal: Absence of new skin breakdown  Description: 1.  Monitor for areas of redness and/or skin breakdown  2.  Assess vascular access sites hourly  3.  Every 4-6 hours minimum:  Change oxygen saturation probe site  4.  Every 4-6 hours:  If on nasal continuous positive airway pressure, respiratory therapy assess nares and determine need for appliance change or resting period.  Outcome: Progressing     Problem: Pain  Goal: Verbalizes/displays adequate comfort level or baseline comfort level  Outcome: Progressing  Flowsheets (Taken 3/15/2024 2020)  Verbalizes/displays adequate comfort level or baseline comfort level: Encourage patient to monitor pain and request assistance     Problem: Chronic Conditions and Co-morbidities  Goal: Patient's chronic conditions and co-morbidity symptoms are monitored and maintained or improved  Outcome: Progressing  Flowsheets (Taken 3/15/2024 2020)  Care Plan - Patient's Chronic Conditions and Co-Morbidity Symptoms are Monitored and Maintained or Improved: Monitor and assess patient's chronic conditions and comorbid symptoms for stability, deterioration, or improvement     Problem: Respiratory - Adult  Goal: Achieves optimal ventilation and oxygenation  Outcome: Progressing  Flowsheets (Taken 3/15/2024 2020)  Achieves optimal ventilation and oxygenation: Assess for changes in respiratory status

## 2024-03-17 LAB
ACID FAST STN SPEC: NEGATIVE
ANION GAP SERPL CALC-SCNC: 5 MMOL/L (ref 2–11)
APO A-I SERPL-MCNC: 100 MG/DL (ref 101–178)
APO B SERPL-MCNC: 117 MG/DL
APO B/APO A-I SERPL: 1.2 RATIO (ref 0–0.7)
BACTERIA SPEC CULT: NORMAL
BACTERIA SPEC CULT: NORMAL
BUN SERPL-MCNC: 15 MG/DL (ref 8–23)
CALCIUM SERPL-MCNC: 8.7 MG/DL (ref 8.3–10.4)
CHLORIDE SERPL-SCNC: 113 MMOL/L (ref 103–113)
CO2 SERPL-SCNC: 21 MMOL/L (ref 21–32)
CREAT SERPL-MCNC: 0.8 MG/DL (ref 0.8–1.5)
ERYTHROCYTE [DISTWIDTH] IN BLOOD BY AUTOMATED COUNT: 14.6 % (ref 11.9–14.6)
GLUCOSE SERPL-MCNC: 112 MG/DL (ref 65–100)
HCT VFR BLD AUTO: 39.8 % (ref 41.1–50.3)
HGB BLD-MCNC: 12.6 G/DL (ref 13.6–17.2)
LPA SERPL-SCNC: 69.7 NMOL/L
MCH RBC QN AUTO: 30.7 PG (ref 26.1–32.9)
MCHC RBC AUTO-ENTMCNC: 31.7 G/DL (ref 31.4–35)
MCV RBC AUTO: 96.8 FL (ref 82–102)
MYCOBACTERIUM SPEC QL CULT: NORMAL
NRBC # BLD: 0 K/UL (ref 0–0.2)
PLATELET # BLD AUTO: 266 K/UL (ref 150–450)
PMV BLD AUTO: 9.5 FL (ref 9.4–12.3)
POTASSIUM SERPL-SCNC: 3.6 MMOL/L (ref 3.5–5.1)
RBC # BLD AUTO: 4.11 M/UL (ref 4.23–5.6)
SERVICE CMNT-IMP: NORMAL
SERVICE CMNT-IMP: NORMAL
SODIUM SERPL-SCNC: 139 MMOL/L (ref 136–146)
SPECIMEN PREPARATION: NORMAL
SPECIMEN SOURCE: NORMAL
WBC # BLD AUTO: 8.1 K/UL (ref 4.3–11.1)

## 2024-03-17 PROCEDURE — 6370000000 HC RX 637 (ALT 250 FOR IP): Performed by: INTERNAL MEDICINE

## 2024-03-17 PROCEDURE — 99232 SBSQ HOSP IP/OBS MODERATE 35: CPT | Performed by: INTERNAL MEDICINE

## 2024-03-17 PROCEDURE — 6370000000 HC RX 637 (ALT 250 FOR IP): Performed by: PHYSICIAN ASSISTANT

## 2024-03-17 PROCEDURE — 2580000003 HC RX 258: Performed by: PHYSICIAN ASSISTANT

## 2024-03-17 PROCEDURE — 6360000002 HC RX W HCPCS: Performed by: PHYSICIAN ASSISTANT

## 2024-03-17 PROCEDURE — 94760 N-INVAS EAR/PLS OXIMETRY 1: CPT

## 2024-03-17 PROCEDURE — 6360000002 HC RX W HCPCS: Performed by: INTERNAL MEDICINE

## 2024-03-17 PROCEDURE — 2140000000 HC CCU INTERMEDIATE R&B

## 2024-03-17 PROCEDURE — 6370000000 HC RX 637 (ALT 250 FOR IP): Performed by: FAMILY MEDICINE

## 2024-03-17 PROCEDURE — 85027 COMPLETE CBC AUTOMATED: CPT

## 2024-03-17 PROCEDURE — 2580000003 HC RX 258: Performed by: INTERNAL MEDICINE

## 2024-03-17 PROCEDURE — 36415 COLL VENOUS BLD VENIPUNCTURE: CPT

## 2024-03-17 PROCEDURE — 80048 BASIC METABOLIC PNL TOTAL CA: CPT

## 2024-03-17 RX ORDER — MINERAL OIL/HYDROPHIL PETROLAT
OINTMENT (GRAM) TOPICAL 2 TIMES DAILY PRN
Status: DISCONTINUED | OUTPATIENT
Start: 2024-03-17 | End: 2024-03-27 | Stop reason: HOSPADM

## 2024-03-17 RX ORDER — OXYMETAZOLINE HYDROCHLORIDE 0.05 G/100ML
2 SPRAY NASAL 2 TIMES DAILY PRN
Status: DISPENSED | OUTPATIENT
Start: 2024-03-17 | End: 2024-03-20

## 2024-03-17 RX ORDER — DIVALPROEX SODIUM 500 MG/1
1000 TABLET, DELAYED RELEASE ORAL NIGHTLY
Status: DISCONTINUED | OUTPATIENT
Start: 2024-03-18 | End: 2024-03-27 | Stop reason: HOSPADM

## 2024-03-17 RX ADMIN — VANCOMYCIN HYDROCHLORIDE 1250 MG: 10 INJECTION, POWDER, LYOPHILIZED, FOR SOLUTION INTRAVENOUS at 13:19

## 2024-03-17 RX ADMIN — HYDROCODONE BITARTRATE AND ACETAMINOPHEN 1 TABLET: 7.5; 325 TABLET ORAL at 10:14

## 2024-03-17 RX ADMIN — TICAGRELOR 90 MG: 90 TABLET ORAL at 20:39

## 2024-03-17 RX ADMIN — DICYCLOMINE HYDROCHLORIDE 20 MG: 10 CAPSULE ORAL at 20:38

## 2024-03-17 RX ADMIN — HYDROCODONE BITARTRATE AND ACETAMINOPHEN 1 TABLET: 7.5; 325 TABLET ORAL at 18:15

## 2024-03-17 RX ADMIN — TICAGRELOR 90 MG: 90 TABLET ORAL at 09:05

## 2024-03-17 RX ADMIN — OLANZAPINE 5 MG: 5 TABLET, FILM COATED ORAL at 20:39

## 2024-03-17 RX ADMIN — DOCUSATE SODIUM 50 MG: 50 LIQUID ORAL at 09:05

## 2024-03-17 RX ADMIN — ATORVASTATIN CALCIUM 80 MG: 80 TABLET, FILM COATED ORAL at 20:39

## 2024-03-17 RX ADMIN — DIVALPROEX SODIUM 1000 MG: 500 TABLET, DELAYED RELEASE ORAL at 09:06

## 2024-03-17 RX ADMIN — SODIUM CHLORIDE, PRESERVATIVE FREE 10 ML: 5 INJECTION INTRAVENOUS at 09:07

## 2024-03-17 RX ADMIN — OXYMETAZOLINE HCL 2 SPRAY: 0.05 SPRAY NASAL at 21:45

## 2024-03-17 RX ADMIN — VANCOMYCIN HYDROCHLORIDE 1250 MG: 10 INJECTION, POWDER, LYOPHILIZED, FOR SOLUTION INTRAVENOUS at 00:01

## 2024-03-17 RX ADMIN — ALPRAZOLAM 1 MG: 0.5 TABLET ORAL at 10:14

## 2024-03-17 RX ADMIN — TOPIRAMATE 50 MG: 25 TABLET, FILM COATED ORAL at 20:39

## 2024-03-17 RX ADMIN — SODIUM CHLORIDE, PRESERVATIVE FREE 5 ML: 5 INJECTION INTRAVENOUS at 20:39

## 2024-03-17 RX ADMIN — DICYCLOMINE HYDROCHLORIDE 20 MG: 10 CAPSULE ORAL at 09:05

## 2024-03-17 RX ADMIN — PANTOPRAZOLE SODIUM 40 MG: 40 TABLET, DELAYED RELEASE ORAL at 09:05

## 2024-03-17 RX ADMIN — TOPIRAMATE 50 MG: 25 TABLET, FILM COATED ORAL at 09:05

## 2024-03-17 RX ADMIN — LEVOTHYROXINE SODIUM 50 MCG: 0.05 TABLET ORAL at 05:09

## 2024-03-17 RX ADMIN — HYDROCODONE BITARTRATE AND ACETAMINOPHEN 1 TABLET: 7.5; 325 TABLET ORAL at 01:45

## 2024-03-17 RX ADMIN — Medication: at 21:45

## 2024-03-17 RX ADMIN — ALPRAZOLAM 1 MG: 0.5 TABLET ORAL at 18:15

## 2024-03-17 RX ADMIN — ENOXAPARIN SODIUM 40 MG: 100 INJECTION SUBCUTANEOUS at 09:05

## 2024-03-17 RX ADMIN — PANTOPRAZOLE SODIUM 40 MG: 40 TABLET, DELAYED RELEASE ORAL at 20:38

## 2024-03-17 RX ADMIN — ASPIRIN 81 MG: 81 TABLET, COATED ORAL at 09:05

## 2024-03-17 ASSESSMENT — PAIN DESCRIPTION - ORIENTATION: ORIENTATION: MID

## 2024-03-17 ASSESSMENT — PAIN DESCRIPTION - DESCRIPTORS: DESCRIPTORS: ACHING;DISCOMFORT

## 2024-03-17 ASSESSMENT — PAIN DESCRIPTION - LOCATION: LOCATION: BACK

## 2024-03-17 ASSESSMENT — PAIN SCALES - GENERAL
PAINLEVEL_OUTOF10: 8
PAINLEVEL_OUTOF10: 0

## 2024-03-17 NOTE — PLAN OF CARE
Problem: Discharge Planning  Goal: Discharge to home or other facility with appropriate resources  Outcome: Progressing  Flowsheets  Taken 3/16/2024 1955 by Milagros Bragg RN  Discharge to home or other facility with appropriate resources: Identify barriers to discharge with patient and caregiver  Taken 3/16/2024 0800 by Dina Diaz RN  Discharge to home or other facility with appropriate resources:   Identify barriers to discharge with patient and caregiver   Arrange for needed discharge resources and transportation as appropriate   Identify discharge learning needs (meds, wound care, etc)     Problem: Safety - Adult  Goal: Free from fall injury  Outcome: Progressing  Flowsheets (Taken 3/16/2024 1955)  Free From Fall Injury: Instruct family/caregiver on patient safety     Problem: Pain  Goal: Verbalizes/displays adequate comfort level or baseline comfort level  Outcome: Progressing  Flowsheets (Taken 3/16/2024 1955)  Verbalizes/displays adequate comfort level or baseline comfort level: Encourage patient to monitor pain and request assistance     Problem: Chronic Conditions and Co-morbidities  Goal: Patient's chronic conditions and co-morbidity symptoms are monitored and maintained or improved  Outcome: Progressing  Flowsheets  Taken 3/16/2024 1955 by Milagros Bragg RN  Care Plan - Patient's Chronic Conditions and Co-Morbidity Symptoms are Monitored and Maintained or Improved: Monitor and assess patient's chronic conditions and comorbid symptoms for stability, deterioration, or improvement  Taken 3/16/2024 0800 by Dina Diaz RN  Care Plan - Patient's Chronic Conditions and Co-Morbidity Symptoms are Monitored and Maintained or Improved:   Monitor and assess patient's chronic conditions and comorbid symptoms for stability, deterioration, or improvement   Collaborate with multidisciplinary team to address chronic and comorbid conditions and prevent exacerbation or deterioration

## 2024-03-18 LAB
ANION GAP SERPL CALC-SCNC: 3 MMOL/L (ref 2–11)
BUN SERPL-MCNC: 15 MG/DL (ref 8–23)
CALCIUM SERPL-MCNC: 8.7 MG/DL (ref 8.3–10.4)
CHLORIDE SERPL-SCNC: 117 MMOL/L (ref 103–113)
CO2 SERPL-SCNC: 24 MMOL/L (ref 21–32)
CREAT SERPL-MCNC: 0.9 MG/DL (ref 0.8–1.5)
ERYTHROCYTE [DISTWIDTH] IN BLOOD BY AUTOMATED COUNT: 14.9 % (ref 11.9–14.6)
GLUCOSE SERPL-MCNC: 106 MG/DL (ref 65–100)
HCT VFR BLD AUTO: 41.3 % (ref 41.1–50.3)
HGB BLD-MCNC: 13 G/DL (ref 13.6–17.2)
MCH RBC QN AUTO: 30.8 PG (ref 26.1–32.9)
MCHC RBC AUTO-ENTMCNC: 31.5 G/DL (ref 31.4–35)
MCV RBC AUTO: 97.9 FL (ref 82–102)
NRBC # BLD: 0 K/UL (ref 0–0.2)
PLATELET # BLD AUTO: 255 K/UL (ref 150–450)
PMV BLD AUTO: 9 FL (ref 9.4–12.3)
POTASSIUM SERPL-SCNC: 4 MMOL/L (ref 3.5–5.1)
RBC # BLD AUTO: 4.22 M/UL (ref 4.23–5.6)
SODIUM SERPL-SCNC: 144 MMOL/L (ref 136–146)
VANCOMYCIN SERPL-MCNC: 27.3 UG/ML
WBC # BLD AUTO: 9 K/UL (ref 4.3–11.1)

## 2024-03-18 PROCEDURE — 36415 COLL VENOUS BLD VENIPUNCTURE: CPT

## 2024-03-18 PROCEDURE — 6360000002 HC RX W HCPCS: Performed by: INTERNAL MEDICINE

## 2024-03-18 PROCEDURE — 6360000002 HC RX W HCPCS: Performed by: NURSE PRACTITIONER

## 2024-03-18 PROCEDURE — 94761 N-INVAS EAR/PLS OXIMETRY MLT: CPT

## 2024-03-18 PROCEDURE — 2580000003 HC RX 258: Performed by: INTERNAL MEDICINE

## 2024-03-18 PROCEDURE — 2580000003 HC RX 258: Performed by: PHYSICIAN ASSISTANT

## 2024-03-18 PROCEDURE — 6370000000 HC RX 637 (ALT 250 FOR IP): Performed by: PHYSICIAN ASSISTANT

## 2024-03-18 PROCEDURE — 2140000000 HC CCU INTERMEDIATE R&B

## 2024-03-18 PROCEDURE — 6370000000 HC RX 637 (ALT 250 FOR IP): Performed by: INTERNAL MEDICINE

## 2024-03-18 PROCEDURE — 6360000002 HC RX W HCPCS: Performed by: PHYSICIAN ASSISTANT

## 2024-03-18 PROCEDURE — 99231 SBSQ HOSP IP/OBS SF/LOW 25: CPT | Performed by: INTERNAL MEDICINE

## 2024-03-18 PROCEDURE — 80048 BASIC METABOLIC PNL TOTAL CA: CPT

## 2024-03-18 PROCEDURE — 6370000000 HC RX 637 (ALT 250 FOR IP): Performed by: NURSE PRACTITIONER

## 2024-03-18 PROCEDURE — 94640 AIRWAY INHALATION TREATMENT: CPT

## 2024-03-18 PROCEDURE — 85027 COMPLETE CBC AUTOMATED: CPT

## 2024-03-18 PROCEDURE — 97530 THERAPEUTIC ACTIVITIES: CPT

## 2024-03-18 PROCEDURE — 6370000000 HC RX 637 (ALT 250 FOR IP): Performed by: FAMILY MEDICINE

## 2024-03-18 PROCEDURE — 99232 SBSQ HOSP IP/OBS MODERATE 35: CPT | Performed by: INTERNAL MEDICINE

## 2024-03-18 PROCEDURE — 80202 ASSAY OF VANCOMYCIN: CPT

## 2024-03-18 RX ADMIN — ALPRAZOLAM 1 MG: 0.5 TABLET ORAL at 12:19

## 2024-03-18 RX ADMIN — ENOXAPARIN SODIUM 40 MG: 100 INJECTION SUBCUTANEOUS at 08:48

## 2024-03-18 RX ADMIN — OLANZAPINE 5 MG: 5 TABLET, FILM COATED ORAL at 21:26

## 2024-03-18 RX ADMIN — TRAMADOL HYDROCHLORIDE 50 MG: 50 TABLET ORAL at 21:26

## 2024-03-18 RX ADMIN — HYDROCODONE BITARTRATE AND ACETAMINOPHEN 1 TABLET: 7.5; 325 TABLET ORAL at 04:50

## 2024-03-18 RX ADMIN — TOPIRAMATE 50 MG: 25 TABLET, FILM COATED ORAL at 21:27

## 2024-03-18 RX ADMIN — ALBUTEROL SULFATE 2.5 MG: 2.5 SOLUTION RESPIRATORY (INHALATION) at 11:20

## 2024-03-18 RX ADMIN — LEVOTHYROXINE SODIUM 50 MCG: 0.05 TABLET ORAL at 04:51

## 2024-03-18 RX ADMIN — TOPIRAMATE 50 MG: 25 TABLET, FILM COATED ORAL at 08:49

## 2024-03-18 RX ADMIN — ATORVASTATIN CALCIUM 80 MG: 80 TABLET, FILM COATED ORAL at 21:27

## 2024-03-18 RX ADMIN — ALBUTEROL SULFATE 2.5 MG: 2.5 SOLUTION RESPIRATORY (INHALATION) at 07:35

## 2024-03-18 RX ADMIN — TICAGRELOR 90 MG: 90 TABLET ORAL at 08:48

## 2024-03-18 RX ADMIN — ASPIRIN 81 MG: 81 TABLET, COATED ORAL at 08:49

## 2024-03-18 RX ADMIN — PANTOPRAZOLE SODIUM 40 MG: 40 TABLET, DELAYED RELEASE ORAL at 08:49

## 2024-03-18 RX ADMIN — VANCOMYCIN HYDROCHLORIDE 1250 MG: 10 INJECTION, POWDER, LYOPHILIZED, FOR SOLUTION INTRAVENOUS at 11:44

## 2024-03-18 RX ADMIN — DIVALPROEX SODIUM 1000 MG: 500 TABLET, DELAYED RELEASE ORAL at 21:26

## 2024-03-18 RX ADMIN — SODIUM CHLORIDE, PRESERVATIVE FREE 10 ML: 5 INJECTION INTRAVENOUS at 21:27

## 2024-03-18 RX ADMIN — ACETYLCYSTEINE 600 MG: 200 SOLUTION ORAL; RESPIRATORY (INHALATION) at 07:35

## 2024-03-18 RX ADMIN — Medication 4 ML: at 07:35

## 2024-03-18 RX ADMIN — TICAGRELOR 90 MG: 90 TABLET ORAL at 21:30

## 2024-03-18 RX ADMIN — HYDROCODONE BITARTRATE AND ACETAMINOPHEN 1 TABLET: 7.5; 325 TABLET ORAL at 16:39

## 2024-03-18 RX ADMIN — PANTOPRAZOLE SODIUM 40 MG: 40 TABLET, DELAYED RELEASE ORAL at 21:27

## 2024-03-18 RX ADMIN — ALBUTEROL SULFATE 2.5 MG: 2.5 SOLUTION RESPIRATORY (INHALATION) at 14:58

## 2024-03-18 RX ADMIN — DOCUSATE SODIUM 50 MG: 50 LIQUID ORAL at 08:47

## 2024-03-18 RX ADMIN — DICYCLOMINE HYDROCHLORIDE 20 MG: 10 CAPSULE ORAL at 21:27

## 2024-03-18 RX ADMIN — SODIUM CHLORIDE, PRESERVATIVE FREE 10 ML: 5 INJECTION INTRAVENOUS at 08:50

## 2024-03-18 RX ADMIN — VANCOMYCIN HYDROCHLORIDE 1250 MG: 10 INJECTION, POWDER, LYOPHILIZED, FOR SOLUTION INTRAVENOUS at 00:24

## 2024-03-18 RX ADMIN — DICYCLOMINE HYDROCHLORIDE 20 MG: 10 CAPSULE ORAL at 08:49

## 2024-03-18 ASSESSMENT — PAIN DESCRIPTION - ORIENTATION
ORIENTATION: LEFT;RIGHT
ORIENTATION: MID
ORIENTATION: POSTERIOR;LOWER

## 2024-03-18 ASSESSMENT — PAIN SCALES - GENERAL
PAINLEVEL_OUTOF10: 6
PAINLEVEL_OUTOF10: 0
PAINLEVEL_OUTOF10: 8
PAINLEVEL_OUTOF10: 0
PAINLEVEL_OUTOF10: 0
PAINLEVEL_OUTOF10: 6

## 2024-03-18 ASSESSMENT — PAIN DESCRIPTION - LOCATION
LOCATION: BACK
LOCATION: BACK
LOCATION: BACK;LEG

## 2024-03-18 ASSESSMENT — PAIN DESCRIPTION - DESCRIPTORS
DESCRIPTORS: ACHING;DISCOMFORT
DESCRIPTORS: ACHING
DESCRIPTORS: ACHING

## 2024-03-18 ASSESSMENT — PAIN DESCRIPTION - FREQUENCY: FREQUENCY: INTERMITTENT

## 2024-03-18 ASSESSMENT — PAIN DESCRIPTION - PAIN TYPE: TYPE: ACUTE PAIN

## 2024-03-18 NOTE — PLAN OF CARE
Problem: Discharge Planning  Goal: Discharge to home or other facility with appropriate resources  Outcome: Progressing  Flowsheets (Taken 3/17/2024 2031)  Discharge to home or other facility with appropriate resources: Identify barriers to discharge with patient and caregiver     Problem: Safety - Adult  Goal: Free from fall injury  Outcome: Progressing  Flowsheets (Taken 3/17/2024 2031)  Free From Fall Injury: Instruct family/caregiver on patient safety     Problem: Skin/Tissue Integrity  Goal: Absence of new skin breakdown  Description: 1.  Monitor for areas of redness and/or skin breakdown  2.  Assess vascular access sites hourly  3.  Every 4-6 hours minimum:  Change oxygen saturation probe site  4.  Every 4-6 hours:  If on nasal continuous positive airway pressure, respiratory therapy assess nares and determine need for appliance change or resting period.  Outcome: Progressing     Problem: Chronic Conditions and Co-morbidities  Goal: Patient's chronic conditions and co-morbidity symptoms are monitored and maintained or improved  Outcome: Progressing  Flowsheets (Taken 3/17/2024 2031)  Care Plan - Patient's Chronic Conditions and Co-Morbidity Symptoms are Monitored and Maintained or Improved: Monitor and assess patient's chronic conditions and comorbid symptoms for stability, deterioration, or improvement     Problem: Respiratory - Adult  Goal: Achieves optimal ventilation and oxygenation  Outcome: Progressing  Flowsheets (Taken 3/17/2024 2031)  Achieves optimal ventilation and oxygenation: Assess for changes in respiratory status

## 2024-03-19 LAB
ANION GAP SERPL CALC-SCNC: 7 MMOL/L (ref 2–11)
BUN SERPL-MCNC: 15 MG/DL (ref 8–23)
CALCIUM SERPL-MCNC: 8.9 MG/DL (ref 8.3–10.4)
CHLORIDE SERPL-SCNC: 114 MMOL/L (ref 103–113)
CO2 SERPL-SCNC: 21 MMOL/L (ref 21–32)
CREAT SERPL-MCNC: 0.8 MG/DL (ref 0.8–1.5)
ERYTHROCYTE [DISTWIDTH] IN BLOOD BY AUTOMATED COUNT: 15.3 % (ref 11.9–14.6)
GLUCOSE SERPL-MCNC: 82 MG/DL (ref 65–100)
HCT VFR BLD AUTO: 40.1 % (ref 41.1–50.3)
HGB BLD-MCNC: 12.4 G/DL (ref 13.6–17.2)
MCH RBC QN AUTO: 30.6 PG (ref 26.1–32.9)
MCHC RBC AUTO-ENTMCNC: 30.9 G/DL (ref 31.4–35)
MCV RBC AUTO: 99 FL (ref 82–102)
NRBC # BLD: 0 K/UL (ref 0–0.2)
PLATELET # BLD AUTO: 242 K/UL (ref 150–450)
PMV BLD AUTO: 9.5 FL (ref 9.4–12.3)
POTASSIUM SERPL-SCNC: 3.7 MMOL/L (ref 3.5–5.1)
RBC # BLD AUTO: 4.05 M/UL (ref 4.23–5.6)
SODIUM SERPL-SCNC: 142 MMOL/L (ref 136–146)
WBC # BLD AUTO: 9.4 K/UL (ref 4.3–11.1)

## 2024-03-19 PROCEDURE — 36415 COLL VENOUS BLD VENIPUNCTURE: CPT

## 2024-03-19 PROCEDURE — 6370000000 HC RX 637 (ALT 250 FOR IP): Performed by: PHYSICIAN ASSISTANT

## 2024-03-19 PROCEDURE — 99231 SBSQ HOSP IP/OBS SF/LOW 25: CPT | Performed by: INTERNAL MEDICINE

## 2024-03-19 PROCEDURE — 6360000002 HC RX W HCPCS: Performed by: NURSE PRACTITIONER

## 2024-03-19 PROCEDURE — 2700000000 HC OXYGEN THERAPY PER DAY

## 2024-03-19 PROCEDURE — 2580000003 HC RX 258: Performed by: INTERNAL MEDICINE

## 2024-03-19 PROCEDURE — 6370000000 HC RX 637 (ALT 250 FOR IP): Performed by: INTERNAL MEDICINE

## 2024-03-19 PROCEDURE — 6360000002 HC RX W HCPCS: Performed by: INTERNAL MEDICINE

## 2024-03-19 PROCEDURE — 85027 COMPLETE CBC AUTOMATED: CPT

## 2024-03-19 PROCEDURE — 6370000000 HC RX 637 (ALT 250 FOR IP): Performed by: FAMILY MEDICINE

## 2024-03-19 PROCEDURE — 2140000000 HC CCU INTERMEDIATE R&B

## 2024-03-19 PROCEDURE — 94640 AIRWAY INHALATION TREATMENT: CPT

## 2024-03-19 PROCEDURE — 2580000003 HC RX 258: Performed by: PHYSICIAN ASSISTANT

## 2024-03-19 PROCEDURE — 6360000002 HC RX W HCPCS: Performed by: PHYSICIAN ASSISTANT

## 2024-03-19 PROCEDURE — 94760 N-INVAS EAR/PLS OXIMETRY 1: CPT

## 2024-03-19 PROCEDURE — 80048 BASIC METABOLIC PNL TOTAL CA: CPT

## 2024-03-19 RX ADMIN — TOPIRAMATE 50 MG: 25 TABLET, FILM COATED ORAL at 09:17

## 2024-03-19 RX ADMIN — HYDROCODONE BITARTRATE AND ACETAMINOPHEN 1 TABLET: 7.5; 325 TABLET ORAL at 09:25

## 2024-03-19 RX ADMIN — MOMETASONE FUROATE AND FORMOTEROL FUMARATE DIHYDRATE 2 PUFF: 100; 5 AEROSOL RESPIRATORY (INHALATION) at 08:34

## 2024-03-19 RX ADMIN — HYDROCODONE BITARTRATE AND ACETAMINOPHEN 1 TABLET: 7.5; 325 TABLET ORAL at 00:18

## 2024-03-19 RX ADMIN — DIVALPROEX SODIUM 1000 MG: 500 TABLET, DELAYED RELEASE ORAL at 20:42

## 2024-03-19 RX ADMIN — LEVOTHYROXINE SODIUM 50 MCG: 0.05 TABLET ORAL at 05:39

## 2024-03-19 RX ADMIN — HYDROCODONE BITARTRATE AND ACETAMINOPHEN 1 TABLET: 7.5; 325 TABLET ORAL at 23:43

## 2024-03-19 RX ADMIN — OLANZAPINE 5 MG: 5 TABLET, FILM COATED ORAL at 20:42

## 2024-03-19 RX ADMIN — DICYCLOMINE HYDROCHLORIDE 20 MG: 10 CAPSULE ORAL at 20:42

## 2024-03-19 RX ADMIN — ALPRAZOLAM 1 MG: 0.5 TABLET ORAL at 20:57

## 2024-03-19 RX ADMIN — ALBUTEROL SULFATE 2.5 MG: 2.5 SOLUTION RESPIRATORY (INHALATION) at 21:23

## 2024-03-19 RX ADMIN — DICYCLOMINE HYDROCHLORIDE 20 MG: 10 CAPSULE ORAL at 09:17

## 2024-03-19 RX ADMIN — PANTOPRAZOLE SODIUM 40 MG: 40 TABLET, DELAYED RELEASE ORAL at 09:17

## 2024-03-19 RX ADMIN — ACETYLCYSTEINE 600 MG: 200 SOLUTION ORAL; RESPIRATORY (INHALATION) at 21:23

## 2024-03-19 RX ADMIN — DOCUSATE SODIUM 50 MG: 50 LIQUID ORAL at 09:17

## 2024-03-19 RX ADMIN — ENOXAPARIN SODIUM 40 MG: 100 INJECTION SUBCUTANEOUS at 09:19

## 2024-03-19 RX ADMIN — ALPRAZOLAM 1 MG: 0.5 TABLET ORAL at 05:39

## 2024-03-19 RX ADMIN — TOPIRAMATE 50 MG: 25 TABLET, FILM COATED ORAL at 20:42

## 2024-03-19 RX ADMIN — PANTOPRAZOLE SODIUM 40 MG: 40 TABLET, DELAYED RELEASE ORAL at 20:42

## 2024-03-19 RX ADMIN — MOMETASONE FUROATE AND FORMOTEROL FUMARATE DIHYDRATE 2 PUFF: 100; 5 AEROSOL RESPIRATORY (INHALATION) at 21:23

## 2024-03-19 RX ADMIN — ACETYLCYSTEINE 600 MG: 200 SOLUTION ORAL; RESPIRATORY (INHALATION) at 08:33

## 2024-03-19 RX ADMIN — TICAGRELOR 90 MG: 90 TABLET ORAL at 20:42

## 2024-03-19 RX ADMIN — Medication 4 ML: at 08:34

## 2024-03-19 RX ADMIN — Medication 4 ML: at 21:30

## 2024-03-19 RX ADMIN — SODIUM CHLORIDE, PRESERVATIVE FREE 10 ML: 5 INJECTION INTRAVENOUS at 20:42

## 2024-03-19 RX ADMIN — HYDROCODONE BITARTRATE AND ACETAMINOPHEN 1 TABLET: 7.5; 325 TABLET ORAL at 17:02

## 2024-03-19 RX ADMIN — TICAGRELOR 90 MG: 90 TABLET ORAL at 09:18

## 2024-03-19 RX ADMIN — VANCOMYCIN HYDROCHLORIDE 1250 MG: 10 INJECTION, POWDER, LYOPHILIZED, FOR SOLUTION INTRAVENOUS at 09:22

## 2024-03-19 RX ADMIN — ATORVASTATIN CALCIUM 80 MG: 80 TABLET, FILM COATED ORAL at 20:42

## 2024-03-19 RX ADMIN — SODIUM CHLORIDE, PRESERVATIVE FREE 10 ML: 5 INJECTION INTRAVENOUS at 09:18

## 2024-03-19 RX ADMIN — ASPIRIN 81 MG: 81 TABLET, COATED ORAL at 09:18

## 2024-03-19 RX ADMIN — ALBUTEROL SULFATE 2.5 MG: 2.5 SOLUTION RESPIRATORY (INHALATION) at 08:33

## 2024-03-19 RX ADMIN — ALBUTEROL SULFATE 2.5 MG: 2.5 SOLUTION RESPIRATORY (INHALATION) at 11:35

## 2024-03-19 ASSESSMENT — PAIN SCALES - GENERAL
PAINLEVEL_OUTOF10: 8
PAINLEVEL_OUTOF10: 3
PAINLEVEL_OUTOF10: 8
PAINLEVEL_OUTOF10: 0
PAINLEVEL_OUTOF10: 3
PAINLEVEL_OUTOF10: 0
PAINLEVEL_OUTOF10: 0
PAINLEVEL_OUTOF10: 8
PAINLEVEL_OUTOF10: 0

## 2024-03-19 ASSESSMENT — PAIN - FUNCTIONAL ASSESSMENT
PAIN_FUNCTIONAL_ASSESSMENT: ACTIVITIES ARE NOT PREVENTED
PAIN_FUNCTIONAL_ASSESSMENT: ACTIVITIES ARE NOT PREVENTED

## 2024-03-19 ASSESSMENT — PAIN DESCRIPTION - DESCRIPTORS
DESCRIPTORS: ACHING

## 2024-03-19 ASSESSMENT — PAIN DESCRIPTION - ORIENTATION
ORIENTATION: LEFT;RIGHT
ORIENTATION: LEFT;RIGHT
ORIENTATION: RIGHT;LEFT
ORIENTATION: LEFT;RIGHT

## 2024-03-19 ASSESSMENT — PAIN DESCRIPTION - FREQUENCY
FREQUENCY: INTERMITTENT
FREQUENCY: INTERMITTENT

## 2024-03-19 ASSESSMENT — PAIN DESCRIPTION - LOCATION
LOCATION: BACK;LEG
LOCATION: BACK;LEG;SHOULDER
LOCATION: BACK;LEG

## 2024-03-19 ASSESSMENT — PAIN DESCRIPTION - PAIN TYPE
TYPE: ACUTE PAIN
TYPE: ACUTE PAIN

## 2024-03-19 NOTE — ADT AUTH CERT
due to concerns for hypotension.  Will reinstitute appropriate antihypertensive medications if patient's blood pressure becomes elevated  -will continue to closely monitor and restart medications as pressures allow.   Still unable to initiate appropriate beta-blocker and/or ACE inhibitor therapy     Obstructive Sleep Apnea  -Continue CPAP     MEDICATIONS:  albuterol (PROVENTIL) (2.5 MG/3ML) 0.083% nebulizer solution 2.5 mg  Dose: 2.5 mg  Freq: 4 TIMES DAILY RESP Route: NEBULIZATION   aspirin EC tablet 81 mg  Dose: 81 mg  Freq: DAILY Route: PO   azithromycin (ZITHROMAX) 500 mg in sodium chloride 0.9 % 250 mL IVPB ([id number])  Dose: 500 mg  Freq: EVERY 24 HOURS Route: IV   cefTRIAXone (ROCEPHIN) 2,000 mg in sodium chloride 0.9 % 50 mL IVPB (mini-bag)  Dose: 2,000 mg  Freq: EVERY 24 HOURS Route: IV  enoxaparin (LOVENOX) injection 40 mg  Dose: 40 mg  Freq: DAILY Route: SC          ORDERS:  I&O

## 2024-03-20 PROCEDURE — 6360000002 HC RX W HCPCS: Performed by: NURSE PRACTITIONER

## 2024-03-20 PROCEDURE — 6360000002 HC RX W HCPCS: Performed by: INTERNAL MEDICINE

## 2024-03-20 PROCEDURE — 2140000000 HC CCU INTERMEDIATE R&B

## 2024-03-20 PROCEDURE — 97530 THERAPEUTIC ACTIVITIES: CPT

## 2024-03-20 PROCEDURE — 94664 DEMO&/EVAL PT USE INHALER: CPT

## 2024-03-20 PROCEDURE — 92526 ORAL FUNCTION THERAPY: CPT

## 2024-03-20 PROCEDURE — 99232 SBSQ HOSP IP/OBS MODERATE 35: CPT | Performed by: INTERNAL MEDICINE

## 2024-03-20 PROCEDURE — 6370000000 HC RX 637 (ALT 250 FOR IP): Performed by: PHYSICIAN ASSISTANT

## 2024-03-20 PROCEDURE — 97112 NEUROMUSCULAR REEDUCATION: CPT

## 2024-03-20 PROCEDURE — 94668 MNPJ CHEST WALL SBSQ: CPT

## 2024-03-20 PROCEDURE — 2580000003 HC RX 258: Performed by: PHYSICIAN ASSISTANT

## 2024-03-20 PROCEDURE — 2580000003 HC RX 258: Performed by: INTERNAL MEDICINE

## 2024-03-20 PROCEDURE — 94760 N-INVAS EAR/PLS OXIMETRY 1: CPT

## 2024-03-20 PROCEDURE — 94640 AIRWAY INHALATION TREATMENT: CPT

## 2024-03-20 PROCEDURE — 6370000000 HC RX 637 (ALT 250 FOR IP): Performed by: INTERNAL MEDICINE

## 2024-03-20 PROCEDURE — 6360000002 HC RX W HCPCS: Performed by: PHYSICIAN ASSISTANT

## 2024-03-20 PROCEDURE — 6370000000 HC RX 637 (ALT 250 FOR IP): Performed by: FAMILY MEDICINE

## 2024-03-20 RX ADMIN — MOMETASONE FUROATE AND FORMOTEROL FUMARATE DIHYDRATE 2 PUFF: 100; 5 AEROSOL RESPIRATORY (INHALATION) at 08:09

## 2024-03-20 RX ADMIN — TOPIRAMATE 50 MG: 25 TABLET, FILM COATED ORAL at 09:01

## 2024-03-20 RX ADMIN — TICAGRELOR 90 MG: 90 TABLET ORAL at 20:40

## 2024-03-20 RX ADMIN — PANTOPRAZOLE SODIUM 40 MG: 40 TABLET, DELAYED RELEASE ORAL at 20:39

## 2024-03-20 RX ADMIN — Medication 4 ML: at 08:09

## 2024-03-20 RX ADMIN — ATORVASTATIN CALCIUM 80 MG: 80 TABLET, FILM COATED ORAL at 20:40

## 2024-03-20 RX ADMIN — TICAGRELOR 90 MG: 90 TABLET ORAL at 09:01

## 2024-03-20 RX ADMIN — PANTOPRAZOLE SODIUM 40 MG: 40 TABLET, DELAYED RELEASE ORAL at 09:01

## 2024-03-20 RX ADMIN — ALBUTEROL SULFATE 2.5 MG: 2.5 SOLUTION RESPIRATORY (INHALATION) at 08:09

## 2024-03-20 RX ADMIN — SODIUM CHLORIDE, PRESERVATIVE FREE 10 ML: 5 INJECTION INTRAVENOUS at 09:02

## 2024-03-20 RX ADMIN — VANCOMYCIN HYDROCHLORIDE 1250 MG: 10 INJECTION, POWDER, LYOPHILIZED, FOR SOLUTION INTRAVENOUS at 04:00

## 2024-03-20 RX ADMIN — ENOXAPARIN SODIUM 40 MG: 100 INJECTION SUBCUTANEOUS at 09:02

## 2024-03-20 RX ADMIN — ALPRAZOLAM 1 MG: 0.5 TABLET ORAL at 14:09

## 2024-03-20 RX ADMIN — ASPIRIN 81 MG: 81 TABLET, COATED ORAL at 09:01

## 2024-03-20 RX ADMIN — DICYCLOMINE HYDROCHLORIDE 20 MG: 10 CAPSULE ORAL at 09:01

## 2024-03-20 RX ADMIN — DOCUSATE SODIUM 50 MG: 50 LIQUID ORAL at 09:02

## 2024-03-20 RX ADMIN — SODIUM CHLORIDE, PRESERVATIVE FREE 10 ML: 5 INJECTION INTRAVENOUS at 20:45

## 2024-03-20 RX ADMIN — ACETYLCYSTEINE 600 MG: 200 SOLUTION ORAL; RESPIRATORY (INHALATION) at 19:50

## 2024-03-20 RX ADMIN — ALBUTEROL SULFATE 2.5 MG: 2.5 SOLUTION RESPIRATORY (INHALATION) at 19:50

## 2024-03-20 RX ADMIN — VANCOMYCIN HYDROCHLORIDE 1250 MG: 10 INJECTION, POWDER, LYOPHILIZED, FOR SOLUTION INTRAVENOUS at 20:44

## 2024-03-20 RX ADMIN — LEVOTHYROXINE SODIUM 50 MCG: 0.05 TABLET ORAL at 05:54

## 2024-03-20 RX ADMIN — TOPIRAMATE 50 MG: 25 TABLET, FILM COATED ORAL at 20:39

## 2024-03-20 RX ADMIN — DIVALPROEX SODIUM 1000 MG: 500 TABLET, DELAYED RELEASE ORAL at 20:39

## 2024-03-20 RX ADMIN — ACETYLCYSTEINE 600 MG: 200 SOLUTION ORAL; RESPIRATORY (INHALATION) at 08:09

## 2024-03-20 RX ADMIN — DICYCLOMINE HYDROCHLORIDE 20 MG: 10 CAPSULE ORAL at 20:39

## 2024-03-20 RX ADMIN — OLANZAPINE 5 MG: 5 TABLET, FILM COATED ORAL at 20:39

## 2024-03-20 RX ADMIN — MOMETASONE FUROATE AND FORMOTEROL FUMARATE DIHYDRATE 2 PUFF: 100; 5 AEROSOL RESPIRATORY (INHALATION) at 19:51

## 2024-03-20 RX ADMIN — Medication 4 ML: at 19:50

## 2024-03-20 RX ADMIN — ALBUTEROL SULFATE 2.5 MG: 2.5 SOLUTION RESPIRATORY (INHALATION) at 11:15

## 2024-03-20 ASSESSMENT — PAIN - FUNCTIONAL ASSESSMENT: PAIN_FUNCTIONAL_ASSESSMENT: ACTIVITIES ARE NOT PREVENTED

## 2024-03-20 ASSESSMENT — PAIN SCALES - GENERAL
PAINLEVEL_OUTOF10: 3
PAINLEVEL_OUTOF10: 0

## 2024-03-20 ASSESSMENT — PAIN DESCRIPTION - FREQUENCY: FREQUENCY: INTERMITTENT

## 2024-03-20 ASSESSMENT — PAIN DESCRIPTION - LOCATION: LOCATION: BACK;LEG

## 2024-03-20 ASSESSMENT — PAIN DESCRIPTION - PAIN TYPE: TYPE: ACUTE PAIN

## 2024-03-20 ASSESSMENT — PAIN DESCRIPTION - ORIENTATION: ORIENTATION: LEFT;RIGHT

## 2024-03-20 ASSESSMENT — PAIN DESCRIPTION - DESCRIPTORS: DESCRIPTORS: ACHING

## 2024-03-21 PROCEDURE — 2140000000 HC CCU INTERMEDIATE R&B

## 2024-03-21 PROCEDURE — 99231 SBSQ HOSP IP/OBS SF/LOW 25: CPT | Performed by: INTERNAL MEDICINE

## 2024-03-21 PROCEDURE — 2580000003 HC RX 258: Performed by: INTERNAL MEDICINE

## 2024-03-21 PROCEDURE — 6370000000 HC RX 637 (ALT 250 FOR IP): Performed by: PHYSICIAN ASSISTANT

## 2024-03-21 PROCEDURE — 94640 AIRWAY INHALATION TREATMENT: CPT

## 2024-03-21 PROCEDURE — 94669 MECHANICAL CHEST WALL OSCILL: CPT

## 2024-03-21 PROCEDURE — 6370000000 HC RX 637 (ALT 250 FOR IP): Performed by: INTERNAL MEDICINE

## 2024-03-21 PROCEDURE — 6360000002 HC RX W HCPCS: Performed by: PHYSICIAN ASSISTANT

## 2024-03-21 PROCEDURE — 94761 N-INVAS EAR/PLS OXIMETRY MLT: CPT

## 2024-03-21 PROCEDURE — 2580000003 HC RX 258: Performed by: PHYSICIAN ASSISTANT

## 2024-03-21 PROCEDURE — 6360000002 HC RX W HCPCS: Performed by: INTERNAL MEDICINE

## 2024-03-21 PROCEDURE — 6370000000 HC RX 637 (ALT 250 FOR IP): Performed by: FAMILY MEDICINE

## 2024-03-21 RX ADMIN — ALBUTEROL SULFATE 2.5 MG: 2.5 SOLUTION RESPIRATORY (INHALATION) at 11:22

## 2024-03-21 RX ADMIN — ENOXAPARIN SODIUM 40 MG: 100 INJECTION SUBCUTANEOUS at 08:57

## 2024-03-21 RX ADMIN — SODIUM CHLORIDE, PRESERVATIVE FREE 10 ML: 5 INJECTION INTRAVENOUS at 20:30

## 2024-03-21 RX ADMIN — Medication 4 ML: at 11:24

## 2024-03-21 RX ADMIN — ALPRAZOLAM 1 MG: 0.5 TABLET ORAL at 02:12

## 2024-03-21 RX ADMIN — TOPIRAMATE 50 MG: 25 TABLET, FILM COATED ORAL at 09:00

## 2024-03-21 RX ADMIN — DICYCLOMINE HYDROCHLORIDE 20 MG: 10 CAPSULE ORAL at 09:00

## 2024-03-21 RX ADMIN — DIVALPROEX SODIUM 1000 MG: 500 TABLET, DELAYED RELEASE ORAL at 20:27

## 2024-03-21 RX ADMIN — OLANZAPINE 5 MG: 5 TABLET, FILM COATED ORAL at 20:27

## 2024-03-21 RX ADMIN — HYDROCODONE BITARTRATE AND ACETAMINOPHEN 1 TABLET: 7.5; 325 TABLET ORAL at 04:57

## 2024-03-21 RX ADMIN — HYDROCODONE BITARTRATE AND ACETAMINOPHEN 1 TABLET: 7.5; 325 TABLET ORAL at 17:24

## 2024-03-21 RX ADMIN — LEVOTHYROXINE SODIUM 50 MCG: 0.05 TABLET ORAL at 05:25

## 2024-03-21 RX ADMIN — DOCUSATE SODIUM 50 MG: 50 LIQUID ORAL at 08:58

## 2024-03-21 RX ADMIN — VANCOMYCIN HYDROCHLORIDE 1250 MG: 10 INJECTION, POWDER, LYOPHILIZED, FOR SOLUTION INTRAVENOUS at 15:57

## 2024-03-21 RX ADMIN — DICYCLOMINE HYDROCHLORIDE 20 MG: 10 CAPSULE ORAL at 20:27

## 2024-03-21 RX ADMIN — ASPIRIN 81 MG: 81 TABLET, COATED ORAL at 09:00

## 2024-03-21 RX ADMIN — HYDROCODONE BITARTRATE AND ACETAMINOPHEN 1 TABLET: 7.5; 325 TABLET ORAL at 11:56

## 2024-03-21 RX ADMIN — PANTOPRAZOLE SODIUM 40 MG: 40 TABLET, DELAYED RELEASE ORAL at 20:27

## 2024-03-21 RX ADMIN — ALPRAZOLAM 1 MG: 0.5 TABLET ORAL at 20:27

## 2024-03-21 RX ADMIN — TOPIRAMATE 50 MG: 25 TABLET, FILM COATED ORAL at 20:26

## 2024-03-21 RX ADMIN — TICAGRELOR 90 MG: 90 TABLET ORAL at 20:29

## 2024-03-21 RX ADMIN — PANTOPRAZOLE SODIUM 40 MG: 40 TABLET, DELAYED RELEASE ORAL at 09:04

## 2024-03-21 RX ADMIN — ATORVASTATIN CALCIUM 80 MG: 80 TABLET, FILM COATED ORAL at 20:27

## 2024-03-21 RX ADMIN — ACETAMINOPHEN 650 MG: 325 TABLET ORAL at 09:03

## 2024-03-21 RX ADMIN — SODIUM CHLORIDE, PRESERVATIVE FREE 10 ML: 5 INJECTION INTRAVENOUS at 08:58

## 2024-03-21 RX ADMIN — IPRATROPIUM BROMIDE AND ALBUTEROL SULFATE 1 DOSE: 2.5; .5 SOLUTION RESPIRATORY (INHALATION) at 14:30

## 2024-03-21 RX ADMIN — TICAGRELOR 90 MG: 90 TABLET ORAL at 09:00

## 2024-03-21 ASSESSMENT — PAIN DESCRIPTION - DESCRIPTORS
DESCRIPTORS: ACHING;SORE
DESCRIPTORS: ACHING

## 2024-03-21 ASSESSMENT — PAIN DESCRIPTION - LOCATION
LOCATION: BUTTOCKS;ABDOMEN;LEG
LOCATION: BACK;LEG
LOCATION: BUTTOCKS
LOCATION: LEG;BACK
LOCATION: ABDOMEN

## 2024-03-21 ASSESSMENT — PAIN DESCRIPTION - ORIENTATION
ORIENTATION: RIGHT
ORIENTATION: MID
ORIENTATION: MID

## 2024-03-21 ASSESSMENT — PAIN SCALES - GENERAL
PAINLEVEL_OUTOF10: 3
PAINLEVEL_OUTOF10: 7
PAINLEVEL_OUTOF10: 8
PAINLEVEL_OUTOF10: 3
PAINLEVEL_OUTOF10: 9

## 2024-03-21 NOTE — PLAN OF CARE
Problem: Discharge Planning  Goal: Discharge to home or other facility with appropriate resources  Outcome: Progressing  Flowsheets (Taken 3/20/2024 8445)  Discharge to home or other facility with appropriate resources:   Identify barriers to discharge with patient and caregiver   Arrange for needed discharge resources and transportation as appropriate   Identify discharge learning needs (meds, wound care, etc)   Arrange for interpreters to assist at discharge as needed

## 2024-03-22 LAB
ANION GAP SERPL CALC-SCNC: 5 MMOL/L (ref 2–11)
BASOPHILS # BLD: 0.2 K/UL (ref 0–0.2)
BASOPHILS NFR BLD: 2 % (ref 0–2)
BUN SERPL-MCNC: 26 MG/DL (ref 8–23)
CALCIUM SERPL-MCNC: 9.6 MG/DL (ref 8.3–10.4)
CHLORIDE SERPL-SCNC: 113 MMOL/L (ref 103–113)
CO2 SERPL-SCNC: 23 MMOL/L (ref 21–32)
CREAT SERPL-MCNC: 0.9 MG/DL (ref 0.8–1.5)
DIFFERENTIAL METHOD BLD: ABNORMAL
EOSINOPHIL # BLD: 1 K/UL (ref 0–0.8)
EOSINOPHIL NFR BLD: 8 % (ref 0.5–7.8)
ERYTHROCYTE [DISTWIDTH] IN BLOOD BY AUTOMATED COUNT: 15.6 % (ref 11.9–14.6)
GLUCOSE SERPL-MCNC: 94 MG/DL (ref 65–100)
HCT VFR BLD AUTO: 42.2 % (ref 41.1–50.3)
HGB BLD-MCNC: 13.6 G/DL (ref 13.6–17.2)
IMM GRANULOCYTES # BLD AUTO: 0.6 K/UL (ref 0–0.5)
IMM GRANULOCYTES NFR BLD AUTO: 5 % (ref 0–5)
LYMPHOCYTES # BLD: 3.5 K/UL (ref 0.5–4.6)
LYMPHOCYTES NFR BLD: 29 % (ref 13–44)
MCH RBC QN AUTO: 31.2 PG (ref 26.1–32.9)
MCHC RBC AUTO-ENTMCNC: 32.2 G/DL (ref 31.4–35)
MCV RBC AUTO: 96.8 FL (ref 82–102)
MONOCYTES # BLD: 1.3 K/UL (ref 0.1–1.3)
MONOCYTES NFR BLD: 11 % (ref 4–12)
NEUTS SEG # BLD: 5.3 K/UL (ref 1.7–8.2)
NEUTS SEG NFR BLD: 45 % (ref 43–78)
NRBC # BLD: 0 K/UL (ref 0–0.2)
PLATELET # BLD AUTO: 293 K/UL (ref 150–450)
PLATELET COMMENT: ADEQUATE
PMV BLD AUTO: 9.7 FL (ref 9.4–12.3)
POTASSIUM SERPL-SCNC: 4.3 MMOL/L (ref 3.5–5.1)
RBC # BLD AUTO: 4.36 M/UL (ref 4.23–5.6)
RBC MORPH BLD: ABNORMAL
RBC MORPH BLD: ABNORMAL
SODIUM SERPL-SCNC: 141 MMOL/L (ref 136–146)
WBC # BLD AUTO: 11.9 K/UL (ref 4.3–11.1)
WBC MORPH BLD: ABNORMAL

## 2024-03-22 PROCEDURE — 80048 BASIC METABOLIC PNL TOTAL CA: CPT

## 2024-03-22 PROCEDURE — 94640 AIRWAY INHALATION TREATMENT: CPT

## 2024-03-22 PROCEDURE — 2580000003 HC RX 258: Performed by: INTERNAL MEDICINE

## 2024-03-22 PROCEDURE — 6370000000 HC RX 637 (ALT 250 FOR IP): Performed by: FAMILY MEDICINE

## 2024-03-22 PROCEDURE — 6360000002 HC RX W HCPCS: Performed by: INTERNAL MEDICINE

## 2024-03-22 PROCEDURE — 2580000003 HC RX 258: Performed by: PHYSICIAN ASSISTANT

## 2024-03-22 PROCEDURE — 6370000000 HC RX 637 (ALT 250 FOR IP): Performed by: PHYSICIAN ASSISTANT

## 2024-03-22 PROCEDURE — 94760 N-INVAS EAR/PLS OXIMETRY 1: CPT

## 2024-03-22 PROCEDURE — 6370000000 HC RX 637 (ALT 250 FOR IP): Performed by: INTERNAL MEDICINE

## 2024-03-22 PROCEDURE — 99231 SBSQ HOSP IP/OBS SF/LOW 25: CPT | Performed by: INTERNAL MEDICINE

## 2024-03-22 PROCEDURE — 6360000002 HC RX W HCPCS: Performed by: PHYSICIAN ASSISTANT

## 2024-03-22 PROCEDURE — 6360000002 HC RX W HCPCS: Performed by: NURSE PRACTITIONER

## 2024-03-22 PROCEDURE — 2140000000 HC CCU INTERMEDIATE R&B

## 2024-03-22 PROCEDURE — 97530 THERAPEUTIC ACTIVITIES: CPT

## 2024-03-22 PROCEDURE — 36415 COLL VENOUS BLD VENIPUNCTURE: CPT

## 2024-03-22 PROCEDURE — 85025 COMPLETE CBC W/AUTO DIFF WBC: CPT

## 2024-03-22 RX ORDER — ALBUTEROL SULFATE 2.5 MG/3ML
2.5 SOLUTION RESPIRATORY (INHALATION) EVERY 4 HOURS PRN
Status: DISCONTINUED | OUTPATIENT
Start: 2024-03-22 | End: 2024-03-27 | Stop reason: HOSPADM

## 2024-03-22 RX ADMIN — TICAGRELOR 90 MG: 90 TABLET ORAL at 09:38

## 2024-03-22 RX ADMIN — OLANZAPINE 5 MG: 5 TABLET, FILM COATED ORAL at 21:11

## 2024-03-22 RX ADMIN — PANTOPRAZOLE SODIUM 40 MG: 40 TABLET, DELAYED RELEASE ORAL at 21:11

## 2024-03-22 RX ADMIN — ATORVASTATIN CALCIUM 80 MG: 80 TABLET, FILM COATED ORAL at 21:10

## 2024-03-22 RX ADMIN — DICYCLOMINE HYDROCHLORIDE 20 MG: 10 CAPSULE ORAL at 21:11

## 2024-03-22 RX ADMIN — PANTOPRAZOLE SODIUM 40 MG: 40 TABLET, DELAYED RELEASE ORAL at 09:38

## 2024-03-22 RX ADMIN — HYDROCODONE BITARTRATE AND ACETAMINOPHEN 1 TABLET: 7.5; 325 TABLET ORAL at 14:18

## 2024-03-22 RX ADMIN — SODIUM CHLORIDE, PRESERVATIVE FREE 10 ML: 5 INJECTION INTRAVENOUS at 09:38

## 2024-03-22 RX ADMIN — MOMETASONE FUROATE AND FORMOTEROL FUMARATE DIHYDRATE 2 PUFF: 100; 5 AEROSOL RESPIRATORY (INHALATION) at 08:35

## 2024-03-22 RX ADMIN — ACETYLCYSTEINE 600 MG: 200 SOLUTION ORAL; RESPIRATORY (INHALATION) at 08:35

## 2024-03-22 RX ADMIN — TICAGRELOR 90 MG: 90 TABLET ORAL at 21:11

## 2024-03-22 RX ADMIN — ALBUTEROL SULFATE 2.5 MG: 2.5 SOLUTION RESPIRATORY (INHALATION) at 08:35

## 2024-03-22 RX ADMIN — ALBUTEROL SULFATE 2.5 MG: 2.5 SOLUTION RESPIRATORY (INHALATION) at 11:42

## 2024-03-22 RX ADMIN — DICYCLOMINE HYDROCHLORIDE 20 MG: 10 CAPSULE ORAL at 09:38

## 2024-03-22 RX ADMIN — TOPIRAMATE 50 MG: 25 TABLET, FILM COATED ORAL at 09:30

## 2024-03-22 RX ADMIN — HYDROCODONE BITARTRATE AND ACETAMINOPHEN 1 TABLET: 7.5; 325 TABLET ORAL at 21:12

## 2024-03-22 RX ADMIN — ALPRAZOLAM 1 MG: 0.5 TABLET ORAL at 22:59

## 2024-03-22 RX ADMIN — HYDROCODONE BITARTRATE AND ACETAMINOPHEN 1 TABLET: 7.5; 325 TABLET ORAL at 05:09

## 2024-03-22 RX ADMIN — ENOXAPARIN SODIUM 40 MG: 100 INJECTION SUBCUTANEOUS at 09:30

## 2024-03-22 RX ADMIN — DIVALPROEX SODIUM 1000 MG: 500 TABLET, DELAYED RELEASE ORAL at 21:10

## 2024-03-22 RX ADMIN — LEVOTHYROXINE SODIUM 50 MCG: 0.05 TABLET ORAL at 05:01

## 2024-03-22 RX ADMIN — Medication 4 ML: at 08:35

## 2024-03-22 RX ADMIN — ASPIRIN 81 MG: 81 TABLET, COATED ORAL at 09:38

## 2024-03-22 RX ADMIN — SODIUM CHLORIDE, PRESERVATIVE FREE 10 ML: 5 INJECTION INTRAVENOUS at 21:14

## 2024-03-22 RX ADMIN — TOPIRAMATE 50 MG: 25 TABLET, FILM COATED ORAL at 21:10

## 2024-03-22 RX ADMIN — VANCOMYCIN HYDROCHLORIDE 1250 MG: 10 INJECTION, POWDER, LYOPHILIZED, FOR SOLUTION INTRAVENOUS at 09:44

## 2024-03-22 RX ADMIN — MOMETASONE FUROATE AND FORMOTEROL FUMARATE DIHYDRATE 2 PUFF: 100; 5 AEROSOL RESPIRATORY (INHALATION) at 19:44

## 2024-03-22 ASSESSMENT — PAIN DESCRIPTION - DESCRIPTORS
DESCRIPTORS: ACHING
DESCRIPTORS: ACHING;SORE
DESCRIPTORS: ACHING

## 2024-03-22 ASSESSMENT — PAIN DESCRIPTION - LOCATION
LOCATION: LEG
LOCATION: BACK;LEG
LOCATION: ABDOMEN;BUTTOCKS

## 2024-03-22 ASSESSMENT — PAIN DESCRIPTION - ORIENTATION
ORIENTATION: MID;RIGHT;LEFT
ORIENTATION: MID;UPPER

## 2024-03-22 ASSESSMENT — PAIN SCALES - GENERAL
PAINLEVEL_OUTOF10: 8
PAINLEVEL_OUTOF10: 4
PAINLEVEL_OUTOF10: 8
PAINLEVEL_OUTOF10: 7

## 2024-03-23 PROBLEM — I25.119 CORONARY ARTERY DISEASE INVOLVING NATIVE CORONARY ARTERY OF NATIVE HEART WITH ANGINA PECTORIS (HCC): Status: ACTIVE | Noted: 2024-03-23

## 2024-03-23 PROCEDURE — 6370000000 HC RX 637 (ALT 250 FOR IP): Performed by: FAMILY MEDICINE

## 2024-03-23 PROCEDURE — 99232 SBSQ HOSP IP/OBS MODERATE 35: CPT | Performed by: INTERNAL MEDICINE

## 2024-03-23 PROCEDURE — 6370000000 HC RX 637 (ALT 250 FOR IP): Performed by: INTERNAL MEDICINE

## 2024-03-23 PROCEDURE — 6370000000 HC RX 637 (ALT 250 FOR IP): Performed by: NURSE PRACTITIONER

## 2024-03-23 PROCEDURE — 2580000003 HC RX 258: Performed by: PHYSICIAN ASSISTANT

## 2024-03-23 PROCEDURE — 94660 CPAP INITIATION&MGMT: CPT

## 2024-03-23 PROCEDURE — 94640 AIRWAY INHALATION TREATMENT: CPT

## 2024-03-23 PROCEDURE — 6360000002 HC RX W HCPCS: Performed by: PHYSICIAN ASSISTANT

## 2024-03-23 PROCEDURE — 6370000000 HC RX 637 (ALT 250 FOR IP): Performed by: PHYSICIAN ASSISTANT

## 2024-03-23 PROCEDURE — 2140000000 HC CCU INTERMEDIATE R&B

## 2024-03-23 PROCEDURE — 94760 N-INVAS EAR/PLS OXIMETRY 1: CPT

## 2024-03-23 RX ADMIN — MOMETASONE FUROATE AND FORMOTEROL FUMARATE DIHYDRATE 2 PUFF: 100; 5 AEROSOL RESPIRATORY (INHALATION) at 08:52

## 2024-03-23 RX ADMIN — PANTOPRAZOLE SODIUM 40 MG: 40 TABLET, DELAYED RELEASE ORAL at 23:16

## 2024-03-23 RX ADMIN — GUAIFENESIN SYRUP AND DEXTROMETHORPHAN 5 ML: 100; 10 SYRUP ORAL at 13:39

## 2024-03-23 RX ADMIN — ALPRAZOLAM 1 MG: 0.5 TABLET ORAL at 11:49

## 2024-03-23 RX ADMIN — TOPIRAMATE 50 MG: 25 TABLET, FILM COATED ORAL at 08:50

## 2024-03-23 RX ADMIN — HYDROCODONE BITARTRATE AND ACETAMINOPHEN 1 TABLET: 7.5; 325 TABLET ORAL at 23:28

## 2024-03-23 RX ADMIN — ENOXAPARIN SODIUM 40 MG: 100 INJECTION SUBCUTANEOUS at 08:19

## 2024-03-23 RX ADMIN — ALPRAZOLAM 1 MG: 0.5 TABLET ORAL at 23:16

## 2024-03-23 RX ADMIN — HYDROCODONE BITARTRATE AND ACETAMINOPHEN 1 TABLET: 7.5; 325 TABLET ORAL at 08:24

## 2024-03-23 RX ADMIN — SODIUM CHLORIDE, PRESERVATIVE FREE 10 ML: 5 INJECTION INTRAVENOUS at 08:27

## 2024-03-23 RX ADMIN — LEVOTHYROXINE SODIUM 50 MCG: 0.05 TABLET ORAL at 05:24

## 2024-03-23 RX ADMIN — DIVALPROEX SODIUM 1000 MG: 500 TABLET, DELAYED RELEASE ORAL at 23:15

## 2024-03-23 RX ADMIN — MOMETASONE FUROATE AND FORMOTEROL FUMARATE DIHYDRATE 2 PUFF: 100; 5 AEROSOL RESPIRATORY (INHALATION) at 20:23

## 2024-03-23 RX ADMIN — OLANZAPINE 5 MG: 5 TABLET, FILM COATED ORAL at 23:16

## 2024-03-23 RX ADMIN — SODIUM CHLORIDE, PRESERVATIVE FREE 5 ML: 5 INJECTION INTRAVENOUS at 20:00

## 2024-03-23 RX ADMIN — DICYCLOMINE HYDROCHLORIDE 20 MG: 10 CAPSULE ORAL at 08:20

## 2024-03-23 RX ADMIN — TICAGRELOR 90 MG: 90 TABLET ORAL at 08:19

## 2024-03-23 RX ADMIN — HYDROCODONE BITARTRATE AND ACETAMINOPHEN 1 TABLET: 7.5; 325 TABLET ORAL at 17:08

## 2024-03-23 RX ADMIN — ATORVASTATIN CALCIUM 80 MG: 80 TABLET, FILM COATED ORAL at 23:16

## 2024-03-23 RX ADMIN — TOPIRAMATE 50 MG: 25 TABLET, FILM COATED ORAL at 23:16

## 2024-03-23 RX ADMIN — ASPIRIN 81 MG: 81 TABLET, COATED ORAL at 08:19

## 2024-03-23 RX ADMIN — DICYCLOMINE HYDROCHLORIDE 20 MG: 10 CAPSULE ORAL at 23:16

## 2024-03-23 RX ADMIN — TICAGRELOR 90 MG: 90 TABLET ORAL at 23:16

## 2024-03-23 RX ADMIN — PANTOPRAZOLE SODIUM 40 MG: 40 TABLET, DELAYED RELEASE ORAL at 08:19

## 2024-03-23 ASSESSMENT — PAIN DESCRIPTION - DESCRIPTORS
DESCRIPTORS: ACHING;DISCOMFORT

## 2024-03-23 ASSESSMENT — PAIN SCALES - WONG BAKER: WONGBAKER_NUMERICALRESPONSE: NO HURT

## 2024-03-23 ASSESSMENT — PAIN DESCRIPTION - LOCATION
LOCATION: BACK

## 2024-03-23 ASSESSMENT — PAIN SCALES - GENERAL
PAINLEVEL_OUTOF10: 7
PAINLEVEL_OUTOF10: 3
PAINLEVEL_OUTOF10: 4
PAINLEVEL_OUTOF10: 0
PAINLEVEL_OUTOF10: 0
PAINLEVEL_OUTOF10: 4
PAINLEVEL_OUTOF10: 8
PAINLEVEL_OUTOF10: 8

## 2024-03-23 ASSESSMENT — PAIN - FUNCTIONAL ASSESSMENT: PAIN_FUNCTIONAL_ASSESSMENT: PREVENTS OR INTERFERES SOME ACTIVE ACTIVITIES AND ADLS

## 2024-03-23 ASSESSMENT — PAIN DESCRIPTION - ORIENTATION: ORIENTATION: MID

## 2024-03-24 PROCEDURE — 6370000000 HC RX 637 (ALT 250 FOR IP): Performed by: INTERNAL MEDICINE

## 2024-03-24 PROCEDURE — 6370000000 HC RX 637 (ALT 250 FOR IP): Performed by: NURSE PRACTITIONER

## 2024-03-24 PROCEDURE — 97164 PT RE-EVAL EST PLAN CARE: CPT

## 2024-03-24 PROCEDURE — 6360000002 HC RX W HCPCS: Performed by: PHYSICIAN ASSISTANT

## 2024-03-24 PROCEDURE — 2140000000 HC CCU INTERMEDIATE R&B

## 2024-03-24 PROCEDURE — 99232 SBSQ HOSP IP/OBS MODERATE 35: CPT | Performed by: INTERNAL MEDICINE

## 2024-03-24 PROCEDURE — 6370000000 HC RX 637 (ALT 250 FOR IP): Performed by: PHYSICIAN ASSISTANT

## 2024-03-24 PROCEDURE — 97530 THERAPEUTIC ACTIVITIES: CPT

## 2024-03-24 PROCEDURE — 6370000000 HC RX 637 (ALT 250 FOR IP): Performed by: FAMILY MEDICINE

## 2024-03-24 PROCEDURE — 94640 AIRWAY INHALATION TREATMENT: CPT

## 2024-03-24 PROCEDURE — 2580000003 HC RX 258: Performed by: PHYSICIAN ASSISTANT

## 2024-03-24 PROCEDURE — 94760 N-INVAS EAR/PLS OXIMETRY 1: CPT

## 2024-03-24 RX ADMIN — HYDROCODONE BITARTRATE AND ACETAMINOPHEN 1 TABLET: 7.5; 325 TABLET ORAL at 10:26

## 2024-03-24 RX ADMIN — TOPIRAMATE 50 MG: 25 TABLET, FILM COATED ORAL at 20:23

## 2024-03-24 RX ADMIN — SODIUM CHLORIDE, PRESERVATIVE FREE 10 ML: 5 INJECTION INTRAVENOUS at 10:06

## 2024-03-24 RX ADMIN — MOMETASONE FUROATE AND FORMOTEROL FUMARATE DIHYDRATE 2 PUFF: 100; 5 AEROSOL RESPIRATORY (INHALATION) at 20:23

## 2024-03-24 RX ADMIN — PANTOPRAZOLE SODIUM 40 MG: 40 TABLET, DELAYED RELEASE ORAL at 10:06

## 2024-03-24 RX ADMIN — TICAGRELOR 90 MG: 90 TABLET ORAL at 10:05

## 2024-03-24 RX ADMIN — ALPRAZOLAM 1 MG: 0.5 TABLET ORAL at 13:39

## 2024-03-24 RX ADMIN — DIVALPROEX SODIUM 1000 MG: 500 TABLET, DELAYED RELEASE ORAL at 20:23

## 2024-03-24 RX ADMIN — HYDROCODONE BITARTRATE AND ACETAMINOPHEN 1 TABLET: 7.5; 325 TABLET ORAL at 17:58

## 2024-03-24 RX ADMIN — LEVOTHYROXINE SODIUM 50 MCG: 0.05 TABLET ORAL at 10:06

## 2024-03-24 RX ADMIN — OLANZAPINE 5 MG: 5 TABLET, FILM COATED ORAL at 20:22

## 2024-03-24 RX ADMIN — TRAMADOL HYDROCHLORIDE 50 MG: 50 TABLET ORAL at 20:22

## 2024-03-24 RX ADMIN — TOPIRAMATE 50 MG: 25 TABLET, FILM COATED ORAL at 10:05

## 2024-03-24 RX ADMIN — IPRATROPIUM BROMIDE AND ALBUTEROL SULFATE 1 DOSE: 2.5; .5 SOLUTION RESPIRATORY (INHALATION) at 20:23

## 2024-03-24 RX ADMIN — DICYCLOMINE HYDROCHLORIDE 20 MG: 10 CAPSULE ORAL at 20:22

## 2024-03-24 RX ADMIN — ATORVASTATIN CALCIUM 80 MG: 80 TABLET, FILM COATED ORAL at 20:22

## 2024-03-24 RX ADMIN — MOMETASONE FUROATE AND FORMOTEROL FUMARATE DIHYDRATE 2 PUFF: 100; 5 AEROSOL RESPIRATORY (INHALATION) at 08:37

## 2024-03-24 RX ADMIN — ENOXAPARIN SODIUM 40 MG: 100 INJECTION SUBCUTANEOUS at 10:06

## 2024-03-24 RX ADMIN — DOCUSATE SODIUM 50 MG: 50 LIQUID ORAL at 10:06

## 2024-03-24 RX ADMIN — TICAGRELOR 90 MG: 90 TABLET ORAL at 20:22

## 2024-03-24 RX ADMIN — ASPIRIN 81 MG: 81 TABLET, COATED ORAL at 10:05

## 2024-03-24 RX ADMIN — PANTOPRAZOLE SODIUM 40 MG: 40 TABLET, DELAYED RELEASE ORAL at 20:23

## 2024-03-24 RX ADMIN — SODIUM CHLORIDE, PRESERVATIVE FREE 10 ML: 5 INJECTION INTRAVENOUS at 20:23

## 2024-03-24 RX ADMIN — DICYCLOMINE HYDROCHLORIDE 20 MG: 10 CAPSULE ORAL at 10:05

## 2024-03-24 ASSESSMENT — PAIN SCALES - GENERAL
PAINLEVEL_OUTOF10: 4
PAINLEVEL_OUTOF10: 8

## 2024-03-24 ASSESSMENT — PAIN DESCRIPTION - LOCATION: LOCATION: BACK

## 2024-03-24 ASSESSMENT — PAIN DESCRIPTION - DESCRIPTORS: DESCRIPTORS: ACHING

## 2024-03-24 ASSESSMENT — PAIN DESCRIPTION - ORIENTATION: ORIENTATION: LOWER

## 2024-03-24 NOTE — THERAPY EVALUATION
ACUTE PHYSICAL THERAPY GOALS:   (Developed with and agreed upon by patient and/or caregiver.)  UPDATED 3/24/24  (1.) Katarina Zuñiga  will move from supine to sit and sit to supine , scoot up and down, and roll side to side with MODIFIED INDEPENDENCE within 7 treatment day(s).     (2.) Katarina Zuñiga will transfer from bed to chair and chair to bed with MODIFIED INDEPENDENCE using the least restrictive device within 7 treatment day(s).    (3.) Katarina Zuñiga will ambulate with MODIFIED INDEPENDENCE for 500 feet with the least restrictive device within 7 treatment day(s).   (4.) Katarina Zuñiga will perform standing static and dynamic balance activities x 15 minutes with SUPERVISION to improve safety within 7 treatment day(s).  (5.) Katarina Zuñiga will perform therapeutic exercises x 15 min for HEP with SUPERVISION to improve strength, endurance, and functional mobility within 7 treatment day(s).      PHYSICAL THERAPY Initial Assessment, Daily Note, and AM  (Link to Caseload Tracking: PT Visit Days : 1  Acknowledge Orders  Time In/Out  PT Charge Capture  Rehab Caseload Tracker    Katarina Zuñiga is a 58 y.o. male   PRIMARY DIAGNOSIS: Elevated troponin  Elevated troponin [R79.89]  Procedure(s) (LRB):  Left heart cath / coronary angiography (N/A)  Percutaneous coronary intervention (N/A)  8 Days Post-Op  Reason for Referral: Generalized Muscle Weakness (M62.81)  Difficulty in walking, Not elsewhere classified (R26.2)  Inpatient: Payor: [employer] MEDICARE / Plan: T MEDICARE-ADVANTAGE PPO / Product Type: Medicare /     ASSESSMENT:     REHAB RECOMMENDATIONS:   Recommendation to date pending progress:  Setting:  Short-term Rehab    Equipment:    To Be Determined     ASSESSMENT:  Per initial evaluation: \"Mr. Zuñiga is a 58 year old male who presents with progressive weakness and is admitted with elevated troponin and pneumonia. At baseline he lives home

## 2024-03-25 PROCEDURE — 94640 AIRWAY INHALATION TREATMENT: CPT

## 2024-03-25 PROCEDURE — 6370000000 HC RX 637 (ALT 250 FOR IP): Performed by: INTERNAL MEDICINE

## 2024-03-25 PROCEDURE — 6370000000 HC RX 637 (ALT 250 FOR IP): Performed by: NURSE PRACTITIONER

## 2024-03-25 PROCEDURE — 2140000000 HC CCU INTERMEDIATE R&B

## 2024-03-25 PROCEDURE — 97530 THERAPEUTIC ACTIVITIES: CPT

## 2024-03-25 PROCEDURE — 6370000000 HC RX 637 (ALT 250 FOR IP): Performed by: PHYSICIAN ASSISTANT

## 2024-03-25 PROCEDURE — 6360000002 HC RX W HCPCS: Performed by: PHYSICIAN ASSISTANT

## 2024-03-25 PROCEDURE — 2580000003 HC RX 258: Performed by: PHYSICIAN ASSISTANT

## 2024-03-25 PROCEDURE — 6370000000 HC RX 637 (ALT 250 FOR IP): Performed by: FAMILY MEDICINE

## 2024-03-25 PROCEDURE — 99232 SBSQ HOSP IP/OBS MODERATE 35: CPT | Performed by: INTERNAL MEDICINE

## 2024-03-25 PROCEDURE — 94760 N-INVAS EAR/PLS OXIMETRY 1: CPT

## 2024-03-25 PROCEDURE — 94761 N-INVAS EAR/PLS OXIMETRY MLT: CPT

## 2024-03-25 RX ADMIN — MOMETASONE FUROATE AND FORMOTEROL FUMARATE DIHYDRATE 2 PUFF: 100; 5 AEROSOL RESPIRATORY (INHALATION) at 08:01

## 2024-03-25 RX ADMIN — TOPIRAMATE 50 MG: 25 TABLET, FILM COATED ORAL at 20:02

## 2024-03-25 RX ADMIN — OLANZAPINE 5 MG: 5 TABLET, FILM COATED ORAL at 20:02

## 2024-03-25 RX ADMIN — LEVOTHYROXINE SODIUM 50 MCG: 0.05 TABLET ORAL at 05:14

## 2024-03-25 RX ADMIN — ATORVASTATIN CALCIUM 80 MG: 80 TABLET, FILM COATED ORAL at 20:03

## 2024-03-25 RX ADMIN — SODIUM CHLORIDE, PRESERVATIVE FREE 10 ML: 5 INJECTION INTRAVENOUS at 20:03

## 2024-03-25 RX ADMIN — TRAMADOL HYDROCHLORIDE 50 MG: 50 TABLET ORAL at 20:02

## 2024-03-25 RX ADMIN — TOPIRAMATE 50 MG: 25 TABLET, FILM COATED ORAL at 08:39

## 2024-03-25 RX ADMIN — HYDROCODONE BITARTRATE AND ACETAMINOPHEN 1 TABLET: 7.5; 325 TABLET ORAL at 05:18

## 2024-03-25 RX ADMIN — ALPRAZOLAM 1 MG: 0.5 TABLET ORAL at 20:02

## 2024-03-25 RX ADMIN — DIVALPROEX SODIUM 1000 MG: 500 TABLET, DELAYED RELEASE ORAL at 20:02

## 2024-03-25 RX ADMIN — DICYCLOMINE HYDROCHLORIDE 20 MG: 10 CAPSULE ORAL at 08:39

## 2024-03-25 RX ADMIN — TICAGRELOR 90 MG: 90 TABLET ORAL at 20:02

## 2024-03-25 RX ADMIN — HYDROCODONE BITARTRATE AND ACETAMINOPHEN 1 TABLET: 7.5; 325 TABLET ORAL at 17:13

## 2024-03-25 RX ADMIN — ASPIRIN 81 MG: 81 TABLET, COATED ORAL at 08:39

## 2024-03-25 RX ADMIN — PANTOPRAZOLE SODIUM 40 MG: 40 TABLET, DELAYED RELEASE ORAL at 20:02

## 2024-03-25 RX ADMIN — GUAIFENESIN SYRUP AND DEXTROMETHORPHAN 5 ML: 100; 10 SYRUP ORAL at 05:14

## 2024-03-25 RX ADMIN — MOMETASONE FUROATE AND FORMOTEROL FUMARATE DIHYDRATE 2 PUFF: 100; 5 AEROSOL RESPIRATORY (INHALATION) at 20:40

## 2024-03-25 RX ADMIN — GUAIFENESIN SYRUP AND DEXTROMETHORPHAN 5 ML: 100; 10 SYRUP ORAL at 13:52

## 2024-03-25 RX ADMIN — HYDROCODONE BITARTRATE AND ACETAMINOPHEN 1 TABLET: 7.5; 325 TABLET ORAL at 23:39

## 2024-03-25 RX ADMIN — HYDROCODONE BITARTRATE AND ACETAMINOPHEN 1 TABLET: 7.5; 325 TABLET ORAL at 11:13

## 2024-03-25 RX ADMIN — GUAIFENESIN SYRUP AND DEXTROMETHORPHAN 5 ML: 100; 10 SYRUP ORAL at 23:41

## 2024-03-25 RX ADMIN — SODIUM CHLORIDE, PRESERVATIVE FREE 10 ML: 5 INJECTION INTRAVENOUS at 08:39

## 2024-03-25 RX ADMIN — PANTOPRAZOLE SODIUM 40 MG: 40 TABLET, DELAYED RELEASE ORAL at 08:39

## 2024-03-25 RX ADMIN — ENOXAPARIN SODIUM 40 MG: 100 INJECTION SUBCUTANEOUS at 08:39

## 2024-03-25 RX ADMIN — DICYCLOMINE HYDROCHLORIDE 20 MG: 10 CAPSULE ORAL at 20:02

## 2024-03-25 RX ADMIN — TICAGRELOR 90 MG: 90 TABLET ORAL at 08:39

## 2024-03-25 ASSESSMENT — PAIN DESCRIPTION - FREQUENCY
FREQUENCY: INTERMITTENT

## 2024-03-25 ASSESSMENT — PAIN DESCRIPTION - PAIN TYPE
TYPE: CHRONIC PAIN
TYPE: ACUTE PAIN
TYPE: CHRONIC PAIN
TYPE: ACUTE PAIN

## 2024-03-25 ASSESSMENT — PAIN SCALES - GENERAL
PAINLEVEL_OUTOF10: 7
PAINLEVEL_OUTOF10: 8
PAINLEVEL_OUTOF10: 8
PAINLEVEL_OUTOF10: 6
PAINLEVEL_OUTOF10: 0
PAINLEVEL_OUTOF10: 0
PAINLEVEL_OUTOF10: 8
PAINLEVEL_OUTOF10: 0
PAINLEVEL_OUTOF10: 0

## 2024-03-25 ASSESSMENT — PAIN DESCRIPTION - LOCATION
LOCATION: BACK
LOCATION: BACK
LOCATION: BACK;LEG
LOCATION: BACK
LOCATION: BACK

## 2024-03-25 ASSESSMENT — PAIN - FUNCTIONAL ASSESSMENT
PAIN_FUNCTIONAL_ASSESSMENT: PREVENTS OR INTERFERES SOME ACTIVE ACTIVITIES AND ADLS

## 2024-03-25 ASSESSMENT — PAIN DESCRIPTION - ORIENTATION
ORIENTATION: LOWER
ORIENTATION: MID;LOWER
ORIENTATION: LOWER

## 2024-03-25 ASSESSMENT — PAIN DESCRIPTION - DESCRIPTORS
DESCRIPTORS: DISCOMFORT;ACHING
DESCRIPTORS: ACHING;DISCOMFORT;SORE
DESCRIPTORS: ACHING;DISCOMFORT
DESCRIPTORS: ACHING;DISCOMFORT
DESCRIPTORS: DISCOMFORT;ACHING

## 2024-03-25 ASSESSMENT — PAIN DESCRIPTION - ONSET
ONSET: GRADUAL

## 2024-03-25 NOTE — DISCHARGE SUMMARY
after the 3rd sl.  Qty: 25 tablet, Refills: 3           CONTINUE these medications which have NOT CHANGED    Details   celecoxib (CELEBREX) 100 MG capsule Take 2 capsules by mouth at bedtime      topiramate (TOPAMAX SPRINKLE) 25 MG capsule Take 2 capsules by mouth 2 times daily      ALPRAZolam (XANAX) 1 MG tablet 1 tablet 4 times daily as needed.      aspirin 81 MG EC tablet Take 1 tablet by mouth daily      cetirizine (ZYRTEC) 10 MG tablet Take 1 tablet by mouth daily as needed      dicyclomine (BENTYL) 10 MG capsule Take 2 capsules by mouth in the morning and at bedtime      divalproex (DEPAKOTE) 500 MG DR tablet Take 2 tablets by mouth 2 times daily      docusate sodium (COLACE) 50 MG capsule Take 1 capsule by mouth      fluticasone-vilanterol (BREO ELLIPTA) 100-25 MCG/INH AEPB inhaler Inhale 1 puff into the lungs every other day      fluticasone (FLONASE) 50 MCG/ACT nasal spray 2 sprays by Nasal route daily as needed      furosemide (LASIX) 20 MG tablet Take 1 tablet by mouth daily      HYDROcodone-acetaminophen (NORCO)  MG per tablet Take 1 tablet by mouth every 6 hours as needed.      ipratropium-albuterol (DUONEB) 0.5-2.5 (3) MG/3ML SOLN nebulizer solution Inhale 3 mLs into the lungs every 6 hours as needed      levalbuterol (XOPENEX HFA) 45 MCG/ACT inhaler Inhale 2 puffs into the lungs every 4 hours as needed      levothyroxine (SYNTHROID) 50 MCG tablet Take 1 tablet by mouth every morning (before breakfast)      OLANZapine (ZYPREXA) 5 MG tablet TAKE 1 TABLET BY MOUTH AT BEDTIME      pantoprazole (PROTONIX) 40 MG tablet Take 1 tablet by mouth 2 times daily           STOP taking these medications       baclofen (LIORESAL) 10 MG tablet Comments:   Reason for Stopping:         carvedilol (COREG) 3.125 MG tablet Comments:   Reason for Stopping:         ergocalciferol (ERGOCALCIFEROL) 1.25 MG (46951 UT) capsule Comments:   Reason for Stopping:         ferrous sulfate (IRON 325) 325 (65 Fe) MG tablet

## 2024-03-25 NOTE — PLAN OF CARE
Problem: Respiratory - Adult  Goal: Achieves optimal ventilation and oxygenation  Outcome: Progressing  Flowsheets (Taken 3/25/2024 0805)  Achieves optimal ventilation and oxygenation: Assess for changes in respiratory status

## 2024-03-26 PROCEDURE — 6370000000 HC RX 637 (ALT 250 FOR IP): Performed by: NURSE PRACTITIONER

## 2024-03-26 PROCEDURE — 6370000000 HC RX 637 (ALT 250 FOR IP): Performed by: INTERNAL MEDICINE

## 2024-03-26 PROCEDURE — 6370000000 HC RX 637 (ALT 250 FOR IP): Performed by: PHYSICIAN ASSISTANT

## 2024-03-26 PROCEDURE — 94761 N-INVAS EAR/PLS OXIMETRY MLT: CPT

## 2024-03-26 PROCEDURE — 6360000002 HC RX W HCPCS: Performed by: PHYSICIAN ASSISTANT

## 2024-03-26 PROCEDURE — 94640 AIRWAY INHALATION TREATMENT: CPT

## 2024-03-26 PROCEDURE — 99232 SBSQ HOSP IP/OBS MODERATE 35: CPT | Performed by: INTERNAL MEDICINE

## 2024-03-26 PROCEDURE — 2140000000 HC CCU INTERMEDIATE R&B

## 2024-03-26 PROCEDURE — 2580000003 HC RX 258: Performed by: PHYSICIAN ASSISTANT

## 2024-03-26 PROCEDURE — 6370000000 HC RX 637 (ALT 250 FOR IP): Performed by: FAMILY MEDICINE

## 2024-03-26 RX ADMIN — ASPIRIN 81 MG: 81 TABLET, COATED ORAL at 08:31

## 2024-03-26 RX ADMIN — PANTOPRAZOLE SODIUM 40 MG: 40 TABLET, DELAYED RELEASE ORAL at 20:43

## 2024-03-26 RX ADMIN — SODIUM CHLORIDE, PRESERVATIVE FREE 10 ML: 5 INJECTION INTRAVENOUS at 20:44

## 2024-03-26 RX ADMIN — TOPIRAMATE 50 MG: 25 TABLET, FILM COATED ORAL at 08:31

## 2024-03-26 RX ADMIN — MOMETASONE FUROATE AND FORMOTEROL FUMARATE DIHYDRATE 2 PUFF: 100; 5 AEROSOL RESPIRATORY (INHALATION) at 20:17

## 2024-03-26 RX ADMIN — TICAGRELOR 90 MG: 90 TABLET ORAL at 08:31

## 2024-03-26 RX ADMIN — DICYCLOMINE HYDROCHLORIDE 20 MG: 10 CAPSULE ORAL at 08:31

## 2024-03-26 RX ADMIN — DIVALPROEX SODIUM 1000 MG: 500 TABLET, DELAYED RELEASE ORAL at 20:43

## 2024-03-26 RX ADMIN — TOPIRAMATE 50 MG: 25 TABLET, FILM COATED ORAL at 20:43

## 2024-03-26 RX ADMIN — GUAIFENESIN SYRUP AND DEXTROMETHORPHAN 5 ML: 100; 10 SYRUP ORAL at 08:38

## 2024-03-26 RX ADMIN — OLANZAPINE 5 MG: 5 TABLET, FILM COATED ORAL at 20:43

## 2024-03-26 RX ADMIN — TICAGRELOR 90 MG: 90 TABLET ORAL at 20:44

## 2024-03-26 RX ADMIN — MOMETASONE FUROATE AND FORMOTEROL FUMARATE DIHYDRATE 2 PUFF: 100; 5 AEROSOL RESPIRATORY (INHALATION) at 08:58

## 2024-03-26 RX ADMIN — SODIUM CHLORIDE, PRESERVATIVE FREE 10 ML: 5 INJECTION INTRAVENOUS at 08:31

## 2024-03-26 RX ADMIN — GUAIFENESIN SYRUP AND DEXTROMETHORPHAN 5 ML: 100; 10 SYRUP ORAL at 17:53

## 2024-03-26 RX ADMIN — ATORVASTATIN CALCIUM 80 MG: 80 TABLET, FILM COATED ORAL at 20:43

## 2024-03-26 RX ADMIN — ENOXAPARIN SODIUM 40 MG: 100 INJECTION SUBCUTANEOUS at 08:31

## 2024-03-26 RX ADMIN — ALPRAZOLAM 1 MG: 0.5 TABLET ORAL at 20:48

## 2024-03-26 RX ADMIN — HYDROCODONE BITARTRATE AND ACETAMINOPHEN 1 TABLET: 7.5; 325 TABLET ORAL at 19:32

## 2024-03-26 RX ADMIN — DICYCLOMINE HYDROCHLORIDE 20 MG: 10 CAPSULE ORAL at 20:44

## 2024-03-26 RX ADMIN — PANTOPRAZOLE SODIUM 40 MG: 40 TABLET, DELAYED RELEASE ORAL at 08:31

## 2024-03-26 RX ADMIN — HYDROCODONE BITARTRATE AND ACETAMINOPHEN 1 TABLET: 7.5; 325 TABLET ORAL at 12:06

## 2024-03-26 RX ADMIN — LEVOTHYROXINE SODIUM 50 MCG: 0.05 TABLET ORAL at 06:10

## 2024-03-26 ASSESSMENT — PAIN DESCRIPTION - LOCATION
LOCATION: BACK

## 2024-03-26 ASSESSMENT — PAIN SCALES - GENERAL
PAINLEVEL_OUTOF10: 0
PAINLEVEL_OUTOF10: 2
PAINLEVEL_OUTOF10: 8
PAINLEVEL_OUTOF10: 0
PAINLEVEL_OUTOF10: 8
PAINLEVEL_OUTOF10: 0
PAINLEVEL_OUTOF10: 0
PAINLEVEL_OUTOF10: 1

## 2024-03-26 ASSESSMENT — PAIN DESCRIPTION - PAIN TYPE
TYPE: CHRONIC PAIN
TYPE: ACUTE PAIN
TYPE: ACUTE PAIN

## 2024-03-26 ASSESSMENT — PAIN DESCRIPTION - DESCRIPTORS
DESCRIPTORS: ACHING;SORE;DISCOMFORT
DESCRIPTORS: ACHING
DESCRIPTORS: ACHING

## 2024-03-26 ASSESSMENT — PAIN DESCRIPTION - ORIENTATION
ORIENTATION: LOWER;RIGHT;LEFT
ORIENTATION: LOWER
ORIENTATION: LOWER

## 2024-03-26 ASSESSMENT — PAIN DESCRIPTION - ONSET
ONSET: GRADUAL
ONSET: GRADUAL

## 2024-03-26 ASSESSMENT — PAIN DESCRIPTION - FREQUENCY
FREQUENCY: INTERMITTENT
FREQUENCY: INTERMITTENT

## 2024-03-26 ASSESSMENT — PAIN - FUNCTIONAL ASSESSMENT
PAIN_FUNCTIONAL_ASSESSMENT: PREVENTS OR INTERFERES SOME ACTIVE ACTIVITIES AND ADLS
PAIN_FUNCTIONAL_ASSESSMENT: PREVENTS OR INTERFERES SOME ACTIVE ACTIVITIES AND ADLS

## 2024-03-26 NOTE — MANAGEMENT PLAN
Attempted to contact [facility] several times at [phone], opt. 3 to complete P2P regarding pt. Unfortunately ultimately [facility]'s system was down for over an hour and a half and unable to be completed. Callback number provided to discuss P2P.    Gigi Yarbrough PA-C

## 2024-03-27 ENCOUNTER — TELEPHONE (OUTPATIENT)
Age: 59
End: 2024-03-27

## 2024-03-27 VITALS
DIASTOLIC BLOOD PRESSURE: 76 MMHG | TEMPERATURE: 97.9 F | BODY MASS INDEX: 27.16 KG/M2 | OXYGEN SATURATION: 93 % | SYSTOLIC BLOOD PRESSURE: 95 MMHG | HEIGHT: 66 IN | HEART RATE: 94 BPM | RESPIRATION RATE: 17 BRPM | WEIGHT: 169 LBS

## 2024-03-27 PROBLEM — T17.500A MUCUS PLUGGING OF BRONCHI: Status: RESOLVED | Noted: 2024-03-12 | Resolved: 2024-03-27

## 2024-03-27 PROBLEM — J18.9 PNEUMONIA OF LEFT LOWER LOBE DUE TO INFECTIOUS ORGANISM: Status: RESOLVED | Noted: 2024-03-12 | Resolved: 2024-03-27

## 2024-03-27 PROBLEM — J15.212 PNEUMONIA OF BOTH LOWER LOBES DUE TO METHICILLIN RESISTANT STAPHYLOCOCCUS AUREUS (MRSA) (HCC): Status: RESOLVED | Noted: 2024-03-16 | Resolved: 2024-03-27

## 2024-03-27 PROBLEM — R79.89 ELEVATED TROPONIN: Status: RESOLVED | Noted: 2024-03-12 | Resolved: 2024-03-27

## 2024-03-27 LAB
SARS-COV-2 RDRP RESP QL NAA+PROBE: NOT DETECTED
SOURCE: NORMAL

## 2024-03-27 PROCEDURE — 2580000003 HC RX 258: Performed by: PHYSICIAN ASSISTANT

## 2024-03-27 PROCEDURE — 6360000002 HC RX W HCPCS: Performed by: PHYSICIAN ASSISTANT

## 2024-03-27 PROCEDURE — 6370000000 HC RX 637 (ALT 250 FOR IP): Performed by: INTERNAL MEDICINE

## 2024-03-27 PROCEDURE — 99239 HOSP IP/OBS DSCHRG MGMT >30: CPT | Performed by: INTERNAL MEDICINE

## 2024-03-27 PROCEDURE — 6370000000 HC RX 637 (ALT 250 FOR IP): Performed by: NURSE PRACTITIONER

## 2024-03-27 PROCEDURE — 94760 N-INVAS EAR/PLS OXIMETRY 1: CPT

## 2024-03-27 PROCEDURE — 6370000000 HC RX 637 (ALT 250 FOR IP): Performed by: PHYSICIAN ASSISTANT

## 2024-03-27 PROCEDURE — 87635 SARS-COV-2 COVID-19 AMP PRB: CPT

## 2024-03-27 PROCEDURE — 94640 AIRWAY INHALATION TREATMENT: CPT

## 2024-03-27 RX ORDER — HYDROCODONE BITARTRATE AND ACETAMINOPHEN 10; 325 MG/1; MG/1
1 TABLET ORAL EVERY 8 HOURS PRN
Qty: 6 TABLET | Refills: 0 | Status: SHIPPED | OUTPATIENT
Start: 2024-03-27 | End: 2024-03-29

## 2024-03-27 RX ORDER — NITROGLYCERIN 0.4 MG/1
TABLET SUBLINGUAL
Qty: 25 TABLET | Refills: 3 | Status: SHIPPED | OUTPATIENT
Start: 2024-03-27

## 2024-03-27 RX ORDER — ATORVASTATIN CALCIUM 80 MG/1
80 TABLET, FILM COATED ORAL NIGHTLY
Qty: 30 TABLET | Refills: 3 | Status: SHIPPED | OUTPATIENT
Start: 2024-03-27 | End: 2024-03-28 | Stop reason: ALTCHOICE

## 2024-03-27 RX ORDER — ALPRAZOLAM 1 MG/1
1 TABLET ORAL 2 TIMES DAILY PRN
Qty: 4 TABLET | Refills: 0 | Status: SHIPPED | OUTPATIENT
Start: 2024-03-27 | End: 2024-03-29

## 2024-03-27 RX ORDER — NALOXONE HYDROCHLORIDE 4 MG/.1ML
1 SPRAY NASAL PRN
Qty: 1 EACH | Refills: 1 | Status: SHIPPED | OUTPATIENT
Start: 2024-03-27

## 2024-03-27 RX ADMIN — MOMETASONE FUROATE AND FORMOTEROL FUMARATE DIHYDRATE 2 PUFF: 100; 5 AEROSOL RESPIRATORY (INHALATION) at 09:02

## 2024-03-27 RX ADMIN — LEVOTHYROXINE SODIUM 50 MCG: 0.05 TABLET ORAL at 08:18

## 2024-03-27 RX ADMIN — TOPIRAMATE 50 MG: 25 TABLET, FILM COATED ORAL at 08:18

## 2024-03-27 RX ADMIN — SODIUM CHLORIDE, PRESERVATIVE FREE 10 ML: 5 INJECTION INTRAVENOUS at 08:18

## 2024-03-27 RX ADMIN — GUAIFENESIN SYRUP AND DEXTROMETHORPHAN 5 ML: 100; 10 SYRUP ORAL at 03:11

## 2024-03-27 RX ADMIN — PANTOPRAZOLE SODIUM 40 MG: 40 TABLET, DELAYED RELEASE ORAL at 08:18

## 2024-03-27 RX ADMIN — ASPIRIN 81 MG: 81 TABLET, COATED ORAL at 08:18

## 2024-03-27 RX ADMIN — HYDROCODONE BITARTRATE AND ACETAMINOPHEN 1 TABLET: 7.5; 325 TABLET ORAL at 09:32

## 2024-03-27 RX ADMIN — DICYCLOMINE HYDROCHLORIDE 20 MG: 10 CAPSULE ORAL at 08:18

## 2024-03-27 RX ADMIN — TICAGRELOR 90 MG: 90 TABLET ORAL at 08:18

## 2024-03-27 RX ADMIN — ENOXAPARIN SODIUM 40 MG: 100 INJECTION SUBCUTANEOUS at 08:17

## 2024-03-27 ASSESSMENT — PAIN - FUNCTIONAL ASSESSMENT: PAIN_FUNCTIONAL_ASSESSMENT: ACTIVITIES ARE NOT PREVENTED

## 2024-03-27 ASSESSMENT — PAIN SCALES - GENERAL
PAINLEVEL_OUTOF10: 0
PAINLEVEL_OUTOF10: 7

## 2024-03-27 ASSESSMENT — PAIN DESCRIPTION - ONSET: ONSET: ON-GOING

## 2024-03-27 ASSESSMENT — PAIN DESCRIPTION - FREQUENCY: FREQUENCY: INTERMITTENT

## 2024-03-27 ASSESSMENT — PAIN DESCRIPTION - LOCATION: LOCATION: BACK

## 2024-03-27 ASSESSMENT — PAIN DESCRIPTION - ORIENTATION: ORIENTATION: LOWER

## 2024-03-27 ASSESSMENT — PAIN SCALES - WONG BAKER: WONGBAKER_NUMERICALRESPONSE: NO HURT

## 2024-03-27 ASSESSMENT — PAIN DESCRIPTION - PAIN TYPE: TYPE: CHRONIC PAIN

## 2024-03-27 ASSESSMENT — PAIN DESCRIPTION - DESCRIPTORS: DESCRIPTORS: DISCOMFORT

## 2024-03-27 NOTE — PROGRESS NOTES
[facility] CARDIOLOGY PROGRESS NOTE           3/23/2024 12:47 PM    Admit Date: 3/12/2024      Subjective:   Patient denies chest pain, chest pressure, shortness of breath or cough, abdominal pain, nausea, vomiting, leg swelling, orthopnea.  No other complaints this morning.    ROS:  Cardiovascular:  As noted above    Objective:      Vitals:    03/23/24 0807 03/23/24 0852 03/23/24 0854 03/23/24 1216   BP: 92/68   108/73   Pulse: 81 76  78   Resp: 16 15 16 17   Temp: 97.5 °F (36.4 °C)   97.5 °F (36.4 °C)   TempSrc: Oral   Oral   SpO2: 97% 96%  95%   Weight:       Height:           Physical Exam:  General-No Acute Distress, awake, supine  Neck- supple, no JVD  CV- regular rate and rhythm no MRG  Lung- clear bilaterally, nonlabored, no wheezes or rales  Abd- soft, nontender, nondistended  Ext- no lower extremity edema bilaterally.  Skin- warm and dry    Data Review:   Recent Labs     03/22/24  0416      K 4.3   BUN 26*   WBC 11.9*   HGB 13.6   HCT 42.2          Assessment/Plan:     Active Hospital Problems    Elevated troponin  NSTEMI  - Status post PCI RCA 3/16/2024  - Continue DAPT (ASA, Brilinta), atorvastatin.  Not on metoprolol ACE inhibitor due to low blood pressures.      Essential hypertension  -Lower range blood pressure, no BP room for additional medications at this time.    Mixed hyperlipidemia  - Continue atorvastatin 80      COPD (chronic obstructive pulmonary disease) ([facility])  -Continue inhalers      Gastroesophageal reflux disease without esophagitis  -On PPI      MARSHA on CPAP  -Continue CPAP      Chronic anxiety  -Continue home medications       Pneumonia of both lower lobes due to methicillin resistant Staphylococcus aureus (MRSA) ([facility])    Abnormal brain MRI    Pneumonia of left lower lobe due to infectious organism    Acquired tracheomalacia    Mucus plugging of bronchi  -Appreciate assistance from pulmonology/medicine service.      Failure to thrive in adult  -Pending 
                         [facility] CARDIOLOGY PROGRESS NOTE           3/24/2024 10:57 AM    Admit Date: 3/12/2024      Subjective:   Patient reports he did well overnight.  Denies chest pain, chest pressure, orthopnea, PND, shortness of breath, cough, wheeze, Jose pain, nausea, vomiting, leg swelling.  No other complaints at this time.    ROS:  Cardiovascular:  As noted above    Objective:      Vitals:    03/24/24 0425 03/24/24 0432 03/24/24 0814 03/24/24 0837   BP: 93/68  112/78    Pulse: 77 67 78 73   Resp: 20  20 16   Temp: 97.3 °F (36.3 °C)  97.3 °F (36.3 °C)    TempSrc:   Oral    SpO2: 96%  93% 94%   Weight: 75.8 kg (167 lb)      Height:           Physical Exam:  General-No Acute Distress, weight, alert, supine  Neck- supple, no JVD  CV- regular rate and rhythm no MRG  Lung- clear bilaterally no wheezes, nonlabored respirations.  Abd- soft, nontender, nondistended  Ext- no edema bilaterally in his legs  Skin- warm and dry    Data Review:   Recent Labs     03/22/24  0416      K 4.3   BUN 26*   WBC 11.9*   HGB 13.6   HCT 42.2          Assessment/Plan:     Elevated troponin  NSTEMI  - Status post PCI RCA 3/16/2024  - Continue DAPT (ASA, Brilinta), atorvastatin.  Not on metoprolol ACE inhibitor due to low blood pressures.       Essential hypertension  -Lower range blood pressure, no BP room for additional medications at this time.  Hemodynamics stable.     Mixed hyperlipidemia  - Continue atorvastatin 80       COPD (chronic obstructive pulmonary disease) (HCC)  -Continue inhalers       Gastroesophageal reflux disease without esophagitis  -On PPI       MARSHA on CPAP  -Continue CPAP       Chronic anxiety  -Continue home medications       Pneumonia of both lower lobes due to methicillin resistant Staphylococcus aureus (MRSA) ([facility])    Abnormal brain MRI    Pneumonia of left lower lobe due to infectious organism    Acquired tracheomalacia    Mucus plugging of bronchi  -Appreciate assistance from 
               Katarina Zuñiga  Admission Date: 3/12/2024         Daily Progress Note: 3/14/2024    The patient's chart is reviewed and the patient is discussed with the staff.    Background: 59yo  male with history of CAD s/p PCI pLAD 5/17/2013, last [facility] 2016 with mild nonobstructive coronary artery disease with patent stent, HTN, dyslipidemia, DM. He is followed regularly by Dr. Wakefield for tracheobronchomalacia. He never personally smoked though has had exposure to second hand smoke and chewing tobacco. He reports he always coughs up mucus. Had bronch 2021 Nancy c/w tracheomalacia and mucus plugging bilaterally.     He presented to [facility] ED on 3/11 with complaint of generalized weakness for the past one month. States weakness progressively worse over the last two week. States he always has cough and congestion and really no different than normal. Denies any fever or chills. ED at [facility]: WBC 10.4, H/H 12.8/40.3, Ptl 190, Na 143, K 4.3, BUN/Cr 22/0.96, trop 0.39- 0.71. CXR showed right lung base opacities, concerning for infection. CTA chest with bibasilar groundglass and nodular opacities. Left lower lobe consolidation. Bilateral mucus plugging. No acute pulmonary arterial thromboembolism. He was given Unasyn and 1L bolus in ED. Patient transferred to [facility] to cardiology service for elevated troponin. Patient denies chest pain, pressure, or tightness. We were consulted for pneumonia.     Subjective:     Resting on CPAP. Having SOB/YATES. Some productive cough that is yellow in color. On room air. Having chronic pain.    Current Facility-Administered Medications   Medication Dose Route Frequency    traMADol (ULTRAM) tablet 50 mg  50 mg Oral Q6H PRN    ALPRAZolam (XANAX) tablet 1 mg  1 mg Oral BID PRN    sodium chloride flush 0.9 % injection 5-40 mL  5-40 mL IntraVENous 2 times per day    sodium chloride flush 0.9 % injection 5-40 mL  5-40 mL IntraVENous PRN    0.9 % sodium chloride 
               Katarina Zuñiga  Admission Date: 3/12/2024         Daily Progress Note: 3/15/2024    The patient's chart is reviewed and the patient is discussed with the staff.    Background: 57yo  male with history of CAD s/p PCI pLAD 5/17/2013, last [facility] 2016 with mild nonobstructive coronary artery disease with patent stent, HTN, dyslipidemia, DM. He is followed regularly by Dr. Wakefield for tracheobronchomalacia. He never personally smoked though has had exposure to second hand smoke and chewing tobacco. He reports he always coughs up mucus. Had bronch 2021 Nancy c/w tracheomalacia and mucus plugging bilaterally.     He presented to [facility] ED on 3/11 with complaint of generalized weakness for the past one month. States weakness progressively worse over the last two week. States he always has cough and congestion and really no different than normal. Denies any fever or chills. ED at [facility]: WBC 10.4, H/H 12.8/40.3, Ptl 190, Na 143, K 4.3, BUN/Cr 22/0.96, trop 0.39- 0.71. CXR showed right lung base opacities, concerning for infection. CTA chest with bibasilar groundglass and nodular opacities. Left lower lobe consolidation. Bilateral mucus plugging. No acute pulmonary arterial thromboembolism. He was given Unasyn and 1L bolus in ED. Patient transferred to [facility] to cardiology service for elevated troponin. Patient denies chest pain, pressure, or tightness. We were consulted for pneumonia.     Subjective:     Resting on CPAP.  Has been refusing breathing treatments stating he felt like he didn't need them, yet still feels very congested and unable to get anything up.   States heart cath planned for tomorrow.    Current Facility-Administered Medications   Medication Dose Route Frequency    atorvastatin (LIPITOR) tablet 80 mg  80 mg Oral Nightly    traMADol (ULTRAM) tablet 50 mg  50 mg Oral Q6H PRN    guaiFENesin-dextromethorphan (ROBITUSSIN DM) 100-10 MG/5ML syrup 5 mL  5 mL Oral Q4H 
               Katarina Zuñiga  Admission Date: 3/12/2024         Daily Progress Note: 3/16/2024    The patient's chart is reviewed and the patient is discussed with the staff.    Background: 59yo  male with history of CAD s/p PCI pLAD 5/17/2013, last [facility] 2016 with mild nonobstructive coronary artery disease with patent stent, HTN, dyslipidemia, DM. He is followed regularly by Dr. Wakefield for tracheobronchomalacia. He never personally smoked though has had exposure to second hand smoke and chewing tobacco. He reports he always coughs up mucus. Had bronch 2021 Nancy c/w tracheomalacia and mucus plugging bilaterally.     He presented to [facility] ED on 3/11 with complaint of generalized weakness for the past one month. States weakness progressively worse over the last two week. States he always has cough and congestion and really no different than normal. Denies any fever or chills. ED at [facility]: WBC 10.4, H/H 12.8/40.3, Ptl 190, Na 143, K 4.3, BUN/Cr 22/0.96, trop 0.39- 0.71. CXR showed right lung base opacities, concerning for infection. CTA chest with bibasilar groundglass and nodular opacities. Left lower lobe consolidation. Bilateral mucus plugging. No acute pulmonary arterial thromboembolism. He was given Unasyn and 1L bolus in ED. Patient transferred to [facility] to cardiology service for elevated troponin. Patient denies chest pain, pressure, or tightness. We were consulted for pneumonia.     Subjective:     Resting on CPAP.  Has been refusing breathing treatments stating he felt like he didn't need them, yet still feels very congested and unable to get anything up.   States heart cath planned for tomorrow.    Current Facility-Administered Medications   Medication Dose Route Frequency    ticagrelor (BRILINTA) tablet 90 mg  90 mg Oral BID    atorvastatin (LIPITOR) tablet 80 mg  80 mg Oral Nightly    acetylcysteine (MUCOMYST) 20 % solution 600 mg  600 mg Inhalation BID RT    traMADol 
               Katarina Zuñiga  Admission Date: 3/12/2024         Daily Progress Note: 3/17/2024    The patient's chart is reviewed and the patient is discussed with the staff.    Background: 57yo  male with history of CAD s/p PCI pLAD 5/17/2013, last [facility] 2016 with mild nonobstructive coronary artery disease with patent stent, HTN, dyslipidemia, DM. He is followed regularly by Dr. Wakefield for tracheobronchomalacia. He never personally smoked though has had exposure to second hand smoke and chewing tobacco. He reports he always coughs up mucus. Had bronch 2021 Nancy c/w tracheomalacia and mucus plugging bilaterally.     He presented to [facility] ED on 3/11 with complaint of generalized weakness for the past one month. States weakness progressively worse over the last two week. States he always has cough and congestion and really no different than normal. Denies any fever or chills. ED at [facility]: WBC 10.4, H/H 12.8/40.3, Ptl 190, Na 143, K 4.3, BUN/Cr 22/0.96, trop 0.39- 0.71. CXR showed right lung base opacities, concerning for infection. CTA chest with bibasilar groundglass and nodular opacities. Left lower lobe consolidation. Bilateral mucus plugging. No acute pulmonary arterial thromboembolism. He was given Unasyn and 1L bolus in ED. Patient transferred to [facility] to cardiology service for elevated troponin. Patient denies chest pain, pressure, or tightness. We were consulted for pneumonia.     Subjective:     Resting on CPAP. Patient is s/p LHC and stent placement yesterday, started on DAPT aspirin and Brilinta. Self-management with CPAP.     Current Facility-Administered Medications   Medication Dose Route Frequency    ticagrelor (BRILINTA) tablet 90 mg  90 mg Oral BID    vancomycin (VANCOCIN) 1250 mg in sodium chloride 0.9% 250 mL IVPB  1,250 mg IntraVENous Q12H    HYDROcodone-acetaminophen (NORCO) 7.5-325 MG per tablet 1 tablet  1 tablet Oral Q6H PRN    atorvastatin (LIPITOR) 
               Katarina Zuñiga  Admission Date: 3/12/2024         Daily Progress Note: 3/18/2024    The patient's chart is reviewed and the patient is discussed with the staff.    Background: 57yo  male with history of CAD s/p PCI pLAD 5/17/2013, last [facility] 2016 with mild nonobstructive coronary artery disease with patent stent, HTN, dyslipidemia, DM. He is followed regularly by Dr. Wakefield for tracheobronchomalacia. He never personally smoked though has had exposure to second hand smoke and chewing tobacco. He reports he always coughs up mucus. Had bronch 2021 Nancy c/w tracheomalacia and mucus plugging bilaterally.     He presented to [facility] ED on 3/11 with complaint of generalized weakness for the past one month. States weakness progressively worse over the last two week. States he always has cough and congestion and really no different than normal. Denies any fever or chills. ED at [facility]: WBC 10.4, H/H 12.8/40.3, Ptl 190, Na 143, K 4.3, BUN/Cr 22/0.96, trop 0.39- 0.71. CXR showed right lung base opacities, concerning for infection. CTA chest with bibasilar groundglass and nodular opacities. Left lower lobe consolidation. Bilateral mucus plugging. No acute pulmonary arterial thromboembolism. He was given Unasyn and 1L bolus in ED. Patient transferred to [facility] to cardiology service for elevated troponin. Patient denies chest pain, pressure, or tightness. We were consulted for pneumonia.     Subjective:   No major events overnight  Already feels better, coughing up discolored mucus without difficulty on room air using noninvasive ventilation as at home  Current Facility-Administered Medications   Medication Dose Route Frequency    oxymetazoline (AFRIN) 0.05 % nasal spray 2 spray  2 spray Each Nostril BID PRN    mineral oil-hydrophilic petrolatum (AQUAPHOR) ointment   Topical BID PRN    divalproex (DEPAKOTE) DR tablet 1,000 mg  1,000 mg Oral Nightly    ticagrelor (BRILINTA) tablet 90 
              [facility] CARDIOLOGY PROGRESS NOTE           3/18/2024 5:00 PM    Admit Date: 3/12/2024      Subjective:   No cp or inc sob    Objective:      Vitals:    03/18/24 1120 03/18/24 1204 03/18/24 1459 03/18/24 1624   BP:  100/64  103/64   Pulse: 83 99  84   Resp: 17 17     Temp:  97.5 °F (36.4 °C)  98.6 °F (37 °C)   TempSrc:  Axillary  Axillary   SpO2: 95% 94% 95% 98%   Weight:       Height:           Physical Exam:  General-No Acute Distress    Data Review:   Recent Labs     03/15/24  2036 03/16/24  0326 03/17/24  0450 03/18/24  0607   NA  --    < > 139 144   K  --    < > 3.6 4.0   MG 2.2  --   --   --    BUN  --    < > 15 15   WBC  --    < > 8.1 9.0   HGB  --    < > 12.6* 13.0*   HCT  --    < > 39.8* 41.3   PLT  --    < > 266 255    < > = values in this interval not displayed.       Assessment/Plan:     Staph pneumonia  COPD  ? Brain tumor  Recent PCI  ///  Cont. asa, Brilinta, atorva  Disposition per IM and Pulmonary      SALVADOR YONY LOPEZ MD  3/18/2024 5:00 PM   
              [facility] CARDIOLOGY PROGRESS NOTE           3/19/2024 4:49 PM    Admit Date: 3/12/2024      Subjective:   No cp or inc sob    Objective:      Vitals:    03/19/24 1111 03/19/24 1159 03/19/24 1200 03/19/24 1620   BP:  (!) 84/55 (!) 86/56 91/64   Pulse:  88 88 91   Resp:  17  17   Temp:  97.9 °F (36.6 °C)  97.5 °F (36.4 °C)   TempSrc:  Axillary  Axillary   SpO2:  95%  96%   Weight:       Height: 1.676 m (5' 6\")          Physical Exam:  General-No Acute Distress  Looks better today    Data Review:   Recent Labs     03/18/24  0607 03/19/24  0556    142   K 4.0 3.7   BUN 15 15   WBC 9.0 9.4   HGB 13.0* 12.4*   HCT 41.3 40.1*    242       Assessment/Plan:     Staph pneumonia  COPD  ? Brain tumor  Recent PCI  ///  Cont. asa, Brilinta, atorva  Disposition per IM and Pulmonary      SALVADOR YONY LOPEZ MD  3/19/2024 4:49 PM   
       Hospitalist Progress Note   Admit Date:  3/12/2024  9:47 AM   Name:  Katarina Zuñiga   Age:  58 y.o.  Sex:  male  :  1965   MRN:  [id number]   Room:  331/01    Presenting/Chief Complaint: No chief complaint on file.     Reason(s) for Admission: Elevated troponin [R79.89]     Hospital Course:   Katarina Zuñiga is a 58 y.o. male with history of CAD s/p PCI/stents , COPD who presented from [facility] on 3/11 with generalized weakness of 1 month duration associated with dyspnea and cough of 2-week duration that have worsened.  At [facility] ED, troponin elevated 0.39-0.71.  CXR shows right lung base opacity concerning for infection.  CTA chest shows bibasilar groundglass and nodular opacities; LLL consolidation; bilateral mucous plugging; no acute PE.  Patient was admitted to cardiology service on 3/12 for elevated troponin.  Hospitalist was consulted for generalized weakness and pneumonia.      Subjective & 24hr Events:   No acute events overnight. HPI same as yesterday. Katarina doing well again today. Actually has no complaints. Feels his usual self. No additional complaints at this time.     Slept: Okay    Eating: Well    Drinking: Well    Stooling: Well    Voiding: Well    Pain: none    ROS  Pertinent positives:  As outlined above    Pertinent negatives:  Chest Pain, Shortness of Breath, Nausea, Vomiting, Abdominal pain, Diarrhea, Constipation, Dysuria, and Urinary frequency    Assessment & Plan:   Elevated troponin  CAD s/p PCI/stents  Cardiology suspected that elevated troponin is not clinically relevant as no angina in setting of multiple medical issues.  TTE 3/12 shows EF 60-65% with normal diastolic function  Continue DAPT + statin  Cardiology primary & managing      MRSA pneumonia  Continue vancomycin, plan is for 7 days of treatment, last day tomorrow  Blood Cx NGT x 5 days  Maintain SpO2 >90-92%, currently on RA  Follow cultures - MRSA, sensitive to TMP/SMX. From 
       Hospitalist Progress Note   Admit Date:  3/12/2024  9:47 AM   Name:  Katarina Zuñiga   Age:  58 y.o.  Sex:  male  :  1965   MRN:  [id number]   Room:  331/01    Presenting/Chief Complaint: No chief complaint on file.     Reason(s) for Admission: Elevated troponin [R79.89]     Hospital Course:   Katarina Zuñiga is a 58 y.o. male with history of CAD s/p PCI/stents , COPD who presented from [facility] on 3/11 with generalized weakness of 1 month duration associated with dyspnea and cough of 2-week duration that have worsened.  At [facility] ED, troponin elevated 0.39-0.71.  CXR shows right lung base opacity concerning for infection.  CTA chest shows bibasilar groundglass and nodular opacities; LLL consolidation; bilateral mucous plugging; no acute PE.  Patient was admitted to cardiology service on 3/12 for elevated troponin.  Hospitalist was consulted for generalized weakness and pneumonia.      Subjective & 24hr Events:   This morning feeling a little weak.  Otherwise denies any abdominal pain or nausea.      Assessment & Plan:   Elevated troponin  CAD s/p PCI/stents  Cardiology suspected that elevated troponin is not clinically relevant as no angina in setting of multiple medical issues.  TTE 3/12 shows EF 60-65% with normal diastolic function  Continue ASA, statin  Holding lisinopril, Coreg d/t borderline hypotension  Cardiology primary     Pneumonia of left lower lobe due to infectious organism  CTA chest shows bibasilar groundglass and nodular opacities; LLL consolidation; bilateral mucous plugging; no acute PE.  ABx: Continue ceftriaxone and azithromycin  BCX: NGTD  SLP to assist     Acquired tracheomalacia  Mucus plugging of bronchi  COPD  Continue albuterol nebs, Dulera, hypertonic saline nebs  Pulmonology on board     Hypothyroidism  Continue home Synthroid  Check TSH     Failure to thrive in adult  Generalized weakness  Cont oral supplements  Nutrition to see  PT/OT 
       Hospitalist Progress Note   Admit Date:  3/12/2024  9:47 AM   Name:  Katarina Zuñiga   Age:  58 y.o.  Sex:  male  :  1965   MRN:  [id number]   Room:  331/01    Presenting/Chief Complaint: No chief complaint on file.     Reason(s) for Admission: Elevated troponin [R79.89]     Hospital Course:   Katarina Zuñiga is a 58 y.o. male with history of CAD s/p PCI/stents , COPD who presented from [facility] on 3/11 with generalized weakness of 1 month duration associated with dyspnea and cough of 2-week duration that have worsened.  At [facility] ED, troponin elevated 0.39-0.71.  CXR shows right lung base opacity concerning for infection.  CTA chest shows bibasilar groundglass and nodular opacities; LLL consolidation; bilateral mucous plugging; no acute PE.  Patient was admitted to cardiology service on 3/12 for elevated troponin.  Hospitalist was consulted for generalized weakness and pneumonia.      Subjective & 24hr Events:   No acute events overnight. HPI same as yesterday. Katarina doing well again today. Actually has no complaints. Feels his usual self. No additional complaints at this time.     Slept: Okay    Eating: Well    Drinking: Well    Stooling: Well    Voiding: Well    Pain: none    ROS  Pertinent positives:  As outlined above    Pertinent negatives:  Chest Pain, Shortness of Breath, Nausea, Vomiting, Abdominal pain, Diarrhea, Constipation, Dysuria, and Urinary frequency    Assessment & Plan:   Elevated troponin  CAD s/p PCI/stents  Cardiology suspected that elevated troponin is not clinically relevant as no angina in setting of multiple medical issues.  TTE 3/12 shows EF 60-65% with normal diastolic function  Continue DAPT + statin  Cardiology primary & managing      MRSA pneumonia  S/p 7 days of treatment with IV Vancomycin, resolved  Blood Cx NGT x 5 days  Maintain SpO2 >90-92%, currently on RA  Follow cultures - MRSA, sensitive to TMP/SMX. From my standpoint could 
       Hospitalist Progress Note   Admit Date:  3/12/2024  9:47 AM   Name:  Katarina Zuñiga   Age:  58 y.o.  Sex:  male  :  1965   MRN:  [id number]   Room:  331/01    Presenting/Chief Complaint: No chief complaint on file.     Reason(s) for Admission: Elevated troponin [R79.89]     Hospital Course:   Katarina Zuñiga is a 58 y.o. male with history of CAD s/p PCI/stents , COPD who presented from [facility] on 3/11 with generalized weakness of 1 month duration associated with dyspnea and cough of 2-week duration that have worsened.  At [facility] ED, troponin elevated 0.39-0.71.  CXR shows right lung base opacity concerning for infection.  CTA chest shows bibasilar groundglass and nodular opacities; LLL consolidation; bilateral mucous plugging; no acute PE.  Patient was admitted to cardiology service on 3/12 for elevated troponin.  Hospitalist was consulted for generalized weakness and pneumonia.      Subjective & 24hr Events:   This morning feeling a little better.  Still having some shortness of breath.  Denies any nausea or vomiting.      Assessment & Plan:   Elevated troponin  CAD s/p PCI/stents  Cardiology suspected that elevated troponin is not clinically relevant as no angina in setting of multiple medical issues.  TTE 3/12 shows EF 60-65% with normal diastolic function  Continue ASA, statin  Holding lisinopril, Coreg d/t borderline hypotension  Cardiology primary     Pneumonia of left lower lobe due to infectious organism  CTA chest shows bibasilar groundglass and nodular opacities; LLL consolidation; bilateral mucous plugging; no acute PE.  ABx: Continue ceftriaxone and azithromycin  BCX: NGTD  Pulmonary recommendations appreciated     Acquired tracheomalacia  Mucus plugging of bronchi  COPD  Continue albuterol nebs, Dulera, hypertonic saline nebs  30 recommendations appreciated, may need bronchoscopy     Hypothyroidism  Continue home Synthroid     Failure to thrive in 
       Hospitalist Progress Note   Admit Date:  3/12/2024  9:47 AM   Name:  Katarina Zuñiga   Age:  58 y.o.  Sex:  male  :  1965   MRN:  [id number]   Room:  331/01    Presenting/Chief Complaint: No chief complaint on file.     Reason(s) for Admission: Elevated troponin [R79.89]     Hospital Course:   Katarina Zuñiga is a 58 y.o. male with history of CAD s/p PCI/stents , COPD who presented from [facility] on 3/11 with generalized weakness of 1 month duration associated with dyspnea and cough of 2-week duration that have worsened.  At [facility] ED, troponin elevated 0.39-0.71.  CXR shows right lung base opacity concerning for infection.  CTA chest shows bibasilar groundglass and nodular opacities; LLL consolidation; bilateral mucous plugging; no acute PE.  Patient was admitted to cardiology service on 3/12 for elevated troponin.  Hospitalist was consulted for generalized weakness and pneumonia.      Subjective & 24hr Events:   No acute events overnight. Katarina doing well today. Actually has no complaints. No additional complaints at this time.     Slept: Okay    Eating: Well    Drinking: Well    Stooling: Well    Voiding: Well    Pain: none    ROS  Pertinent positives:  As outlined above    Pertinent negatives:  Chest Pain, Shortness of Breath, Nausea, Vomiting, Abdominal pain, Diarrhea, Constipation, Dysuria, and Urinary frequency    Assessment & Plan:   Elevated troponin  CAD s/p PCI/stents  Cardiology suspected that elevated troponin is not clinically relevant as no angina in setting of multiple medical issues.  TTE 3/12 shows EF 60-65% with normal diastolic function  Continue DAPT + statin  Cardiology primary & managing      MRSA pneumonia  Continue vancomycin, plan is for 7 days of treatment   Blood Cx NGT x 5 days  Maintain SpO2 >90-92%, currently on RA  Follow cultures - MRSA, sensitive to TMP/SMX. From my standpoint could transition to bactrim to complete course at 
  3/13/2024 11:15 AM    Admit Date: 3/12/2024    Admit Diagnosis: Elevated troponin [R79.89]      Subjective:   No chest pain.  Some shortness of breath persists      Objective:    BP (!) 85/59   Pulse 99   Temp 99 °F (37.2 °C) (Oral)   Resp 16   Ht 1.676 m (5' 6\")   Wt 77.9 kg (171 lb 11.2 oz)   SpO2 96%   BMI 27.71 kg/m²     Physical Exam:  General-Well Developed, Well Nourished, No Acute Distress, Alert & Oriented x 3, appropriate mood.  Neck- supple, no JVD  CV- regular rate and rhythm no MRG  Lung- clear bilaterally  Abd- soft, nontender, nondistended  Ext- no edema bilaterally.  Skin- warm and dry        Data Review:   Recent Labs     03/13/24  0323      K 3.7   BUN 10   WBC 8.1   HGB 12.4*   HCT 38.8*      HDL 36*       Assessment/Plan:     Active Hospital Problems    Elevated troponin      Pneumonia of left lower lobe due to infectious organism      Acquired tracheomalacia      Mucus plugging of bronchi      Failure to thrive in adult    Troponin-low positive.  No active angina.  No wall motion abnormalities on echo.  No further cardiology evaluation indicated.  Will discuss transfer to hospitalist service with them  
  3/22/2024 2:51 PM    Admit Date: 3/12/2024    Admit Diagnosis: Elevated troponin [R79.89]      Subjective:   No chest pain or shortness of breath      Objective:    /65   Pulse 97   Temp 97.7 °F (36.5 °C) (Oral)   Resp 14   Ht 1.676 m (5' 6\")   Wt 79 kg (174 lb 1.6 oz)   SpO2 92%   BMI 28.10 kg/m²     Physical Exam:  General-Well Developed, Well Nourished, No Acute Distress, Alert & Oriented x 3, appropriate mood.  Neck- supple, no JVD  CV- regular rate and rhythm no MRG  Lung- clear bilaterally  Abd- soft, nontender, nondistended  Ext- no edema bilaterally.  Skin- warm and dry        Data Review:   Recent Labs     03/22/24  0416      K 4.3   BUN 26*   WBC 11.9*   HGB 13.6   HCT 42.2          Assessment/Plan:     Active Hospital Problems    Elevated troponin-status post PCI.  Doing well.  Still with significant debility.  Will transfer to rehab when bed available      Pneumonia of both lower lobes due to methicillin resistant Staphylococcus aureus (MRSA) (Formerly [facility])      Abnormal brain MRI      Pneumonia of left lower lobe due to infectious organism      Acquired tracheomalacia      Mucus plugging of bronchi      Failure to thrive in adult      Essential hypertension            Last Assessment & Plan:              Formatting of this note might be different from            the original. Disease state was reviewed,            considered chronic and currently unchanged/stable.            The patient's blood pressure readings were            reviewed and current treatment options addressed.                          Continue current therapy of carvedilol,            lisinopril                          Recommend a heart healthy diet with a focus on            limiting sodium and caffeine intake along with            regular physical activity.  Recommend reaching and            maintaining a healthy weight.  Encouraged self            monitoring and adherence to medical treatment.             Advised 
.pp  
24 hour PPD to LFA checked at 2153. Induration 0, test negative. Unable to enter results under summary tab.  
3/27/2024 7:22 AM    Admit Date: 3/12/2024    Admit Diagnosis: Elevated troponin [R79.89]    Critical care time 6 AM to 6:30 AM managing patient reviewing chart.    Subjective:   Katarina Zuñiga is a 58 y.o. male with history of CAD s/p PCI/stents 2013, COPD, LHC 2016, HTN, dyslipidemia, DM who presented from [facility] on 3/11 with generalized weakness of 1 month duration associated with dyspnea and cough of 2-week duration that have worsened. At [facility] ED, troponin elevated 0.39-0.71. CXR shows right lung base opacity concerning for infection. CTA chest shows bibasilar groundglass and nodular opacities; LLL consolidation; bilateral mucous plugging; no acute PE.    Patient was transferred to [facility] and admitted to cardiology service on 3/12 for elevated troponin. Hospitalist was consulted for generalized weakness and pneumonia. MRI Brain on 3/13 showed evidence of right frontoparietal gyral abnormality. Neuro recommended follow-up with repeat imaging and is not a candidate for biopsy due to DAPT per NSG. s/p PCI RCA 3/16/2024 on DAPT. Echo showed EF 60-65%.    No acute events overnight. No complaints at this time. Denies fever, SOB, Chest pain/pressure/tightness. Per CM, attempted to contact [employer] several times to discuss P2P but unable to get in contact. Patient ready for discharge.    Objective:    /79   Pulse 78   Temp 98.6 °F (37 °C)   Resp 17   Ht 1.676 m (5' 6\")   Wt 76.7 kg (169 lb)   SpO2 96%   BMI 27.28 kg/m²     ROS:  General ROS: negative for - chills  Hematological and Lymphatic ROS: negative for - blood clots or jaundice  Respiratory ROS: no cough, shortness of breath, or wheezing  Cardiovascular ROS: no chest pain or dyspnea on exertion  Gastrointestinal ROS: no abdominal pain, change in bowel habits, or black or bloody stools  Neurological ROS: no TIA or stroke symptoms    Physical Exam:    Physical Examination: General appearance - Appearance: alert, well appearing, 
4 Eyes Skin Assessment     NAME:  Katarina Zuñiga  YOB: 1965  MEDICAL RECORD NUMBER:  [id number]    The patient is being assessed for  Admission    I agree that at least one RN has performed a thorough Head to Toe Skin Assessment on the patient. ALL assessment sites listed below have been assessed.      Areas assessed by both nurses:    Head, Face, Ears, Shoulders, Back, Chest, Arms, Elbows, Hands, Sacrum. Buttock, Coccyx, Ischium, and Legs. Feet and Heels        Does the Patient have a Wound? Yes wound(s) were present on assessment. LDA wound assessment was Initiated and completed by RN       - Sacral excoriation    Mario Prevention initiated by RN: Yes  Wound Care Orders initiated by RN: Yes    Pressure Injury (Stage 3,4, Unstageable, DTI, NWPT, and Complex wounds) if present, place Wound referral order by RN under : No    New Ostomies, if present place, Ostomy referral order under : No     Nurse 1 eSignature: Electronically signed by Harmony Posey RN on 3/12/24 at 10:02 AM EDT    **SHARE this note so that the co-signing nurse can place an eSignature**    Nurse 2 eSignature: Electronically signed by Keri Harper RN on 3/12/24 at 12:25 PM EDT   
ACUTE OCCUPATIONAL THERAPY GOALS:   (Developed with and agreed upon by patient and/or caregiver.)  1. Patient will complete lower body bathing and dressing with SUPERVISION and adaptive equipment as needed.   2. Patient will complete toileting with SUPERVISION.   3. Patient will tolerate 30 minutes of OT treatment with 1-2 rest breaks to increase activity tolerance for ADLs.   4. Patient will complete functional transfers with SUPERVISION and adaptive equipment as needed.   5. Patient will complete functional mobility for household distances with SUPERVISION and adaptive equipment as needed.  6. Patient will complete self-grooming while standing edge of sink with SUPERVISION and adaptive equipment as needed.     Timeframe: 7 visits     OCCUPATIONAL THERAPY: Daily Note AM   OT Visit Days: 2   Time In/Out  OT Charge Capture  Rehab Caseload Tracker  OT Orders    Katarina Zuñiga is a 58 y.o. male   PRIMARY DIAGNOSIS: Elevated troponin  Elevated troponin [R79.89]       Inpatient: Payor: [employer] MEDICARE / Plan: [facility] MEDICARE-ADVANTAGE PPO / Product Type: Medicare /     ASSESSMENT:     REHAB RECOMMENDATIONS:   Recommendation to date pending progress:  Setting:  Short-term Rehab    Equipment:    To Be Determined     ASSESSMENT:  Mr. Zuñiga continues to present with mild deficits in overall strength, activity tolerance, ADL performance, and functional mobility. CGA for bed mobility and ambulation to bathroom. SBA for toileting tasks in unsupported sitting. Proceeded to complete self-grooming tasks, including washing face and brushing teeth, standing EOS with CGA. Ambulated for household distances with CGA x RW. Ultimately returned to room to sitting upright in chair with needs met. Good progress towards goals. Will continue OT efforts as indicated.       SUBJECTIVE:     Mr. Zuñiga states, \"Ill eat my lunch now if we're done.\"     Social/Functional Lives With: Spouse  Type of Home: House  Receives Help 
ACUTE OCCUPATIONAL THERAPY GOALS:   (Developed with and agreed upon by patient and/or caregiver.)  1. Patient will complete lower body bathing and dressing with SUPERVISION and adaptive equipment as needed.   2. Patient will complete toileting with SUPERVISION.   3. Patient will tolerate 30 minutes of OT treatment with 1-2 rest breaks to increase activity tolerance for ADLs.   4. Patient will complete functional transfers with SUPERVISION and adaptive equipment as needed.   5. Patient will complete functional mobility for household distances with SUPERVISION and adaptive equipment as needed.  6. Patient will complete self-grooming while standing edge of sink with SUPERVISION and adaptive equipment as needed.     Timeframe: 7 visits     OCCUPATIONAL THERAPY: Daily Note PM   OT Visit Days: 4   Time In/Out  OT Charge Capture  Rehab Caseload Tracker  OT Orders    Katarina Zuñiga is a 58 y.o. male   PRIMARY DIAGNOSIS: Elevated troponin  Elevated troponin [R79.89]  Procedure(s) (LRB):  Left heart cath / coronary angiography (N/A)  Percutaneous coronary intervention (N/A)  9 Days Post-Op  Inpatient: Payor: [facility] MEDICARE / Plan: [facility] MEDICARE-ADVANTAGE PPO / Product Type: Medicare /     ASSESSMENT:     REHAB RECOMMENDATIONS:   Recommendation to date pending progress:  Setting:  Short-term Rehab    Equipment:    To Be Determined     ASSESSMENT:  Mr. Zuñiga was received supine in bed. Pt required assistance for functional mobility and ADLs today due to unsteadiness, decreased activity tolerance. Pt is slowly but surely progressing with functional mobility and ADLs. Continue POC.         SUBJECTIVE:     Mr. Zuñiga states, \"Let me eat a hot dog while ya'll are talking.\"    Social/Functional Lives With: Spouse  Type of Home: House  Receives Help From: Family  ADL Assistance: Needs assistance  Ambulation Assistance: Needs assistance (RW)  Transfer Assistance: Independent  Active : No  Patient's  
ACUTE OCCUPATIONAL THERAPY GOALS:   (Developed with and agreed upon by patient and/or caregiver.)  1. Patient will complete lower body bathing and dressing with SUPERVISION and adaptive equipment as needed.   2. Patient will complete toileting with SUPERVISION.   3. Patient will tolerate 30 minutes of OT treatment with 1-2 rest breaks to increase activity tolerance for ADLs.   4. Patient will complete functional transfers with SUPERVISION and adaptive equipment as needed.   5. Patient will complete functional mobility for household distances with SUPERVISION and adaptive equipment as needed.  6. Patient will complete self-grooming while standing edge of sink with SUPERVISION and adaptive equipment as needed.    Timeframe: 7 visits       OCCUPATIONAL THERAPY Initial Assessment, Daily Note, and AM       OT Visit Days: 1   Acknowledge Orders  Time  OT Charge Capture  Rehab Caseload Tracker      Katarina Zuñiga is a 58 y.o. male   PRIMARY DIAGNOSIS: Elevated troponin  Elevated troponin [R79.89]       Reason for Referral: Generalized Muscle Weakness (M62.81)  Other lack of cordination (R27.8)  Difficulty in walking, Not elsewhere classified (R26.2)  Observation: Payor: [employer] / Plan: [employer] MED[employer]ARE-ADVANTAGE PPO / Product Type: Medicare /     ASSESSMENT:     REHAB RECOMMENDATIONS:   Recommendation to date pending progress:  Setting:  Short-term Rehab    Equipment:    To Be Determined     ASSESSMENT:  Mr. Zuñiga presents with deficits in overall strength, activity tolerance, activities of daily living performance, and functional mobility. Presented to hospital for cardiac workup after onset of elevated troponins. Wife reports ongoing weakness at home limiting his functional status specifically with dressing, bathing and grooming tasks. Mostly using RW for mobility but has not been ambulating much recently. Patient reports onset of L hand and neck tingling (RN aware and going for MRI today). 
ACUTE PHYSICAL THERAPY GOALS:   (Developed with and agreed upon by patient and/or caregiver.)  (1.) Katarina Zuñiga  will move from supine to sit and sit to supine , scoot up and down, and roll side to side with STAND BY ASSIST within 7 treatment day(s).    (2.) Katarina Zuñiga will transfer from bed to chair and chair to bed with STAND BY ASSIST using the least restrictive device within 7 treatment day(s).    (3.) Katarina Zuñiga will ambulate with STAND BY ASSIST for 150 feet with the least restrictive device within 7 treatment day(s).   (4.) Katarina Zuñiga will perform standing static and dynamic balance activities x 5 minutes with STAND BY ASSIST to improve safety within 7 treatment day(s).  (5.) Katarina Zuñiga will perform therapeutic exercises x 10 min for HEP with STAND BY ASSIST to improve strength, endurance, and functional mobility within 7 treatment day(s).      PHYSICAL THERAPY: Daily Note AM   (Link to Caseload Tracking: PT Visit Days : 2  Time In/Out PT Charge Capture  Rehab Caseload Tracker  Orders    Katarina Zuñiga is a 58 y.o. male   PRIMARY DIAGNOSIS: Elevated troponin  Elevated troponin [R79.89]       [facility] / Plan: [facility] MEDICARE-ADVANTAGE PPO / Product Type: Medicare /     ASSESSMENT:     REHAB RECOMMENDATIONS:   Recommendation to date pending progress:  Setting:  Short-term Rehab    Equipment:    To Be Determined     ASSESSMENT:  Mr. Zuñiga was supine upon contact and agreeable to PT. Patient performed supine to sit and transfer to standing with CGA-SBA and cues for technique. Once standing patient ambulated into the bathroom with slightly unsteady gait without use of assistive device transferring on and off toilet with SBA. Patient then demonstrated fair to fair+ standing balance during prolonged standing balance activities at the sink while performing functional tasks. Patient then ambulated 200' 
ACUTE PHYSICAL THERAPY GOALS:   (Developed with and agreed upon by patient and/or caregiver.)  (1.) Katarina Zuñiga  will move from supine to sit and sit to supine , scoot up and down, and roll side to side with STAND BY ASSIST within 7 treatment day(s).    (2.) Katarina Zuñiga will transfer from bed to chair and chair to bed with STAND BY ASSIST using the least restrictive device within 7 treatment day(s).    (3.) Katarina Zuñiga will ambulate with STAND BY ASSIST for 150 feet with the least restrictive device within 7 treatment day(s).   (4.) Katarina Zuñiga will perform standing static and dynamic balance activities x 5 minutes with STAND BY ASSIST to improve safety within 7 treatment day(s).  (5.) Katarina Zuñiga will perform therapeutic exercises x 10 min for HEP with STAND BY ASSIST to improve strength, endurance, and functional mobility within 7 treatment day(s).      PHYSICAL THERAPY: Daily Note AM   (Link to Caseload Tracking: PT Visit Days : 5  Time In/Out PT Charge Capture  Rehab Caseload Tracker  Orders    Katarina Zuñiga is a 58 y.o. male   PRIMARY DIAGNOSIS: Elevated troponin  Elevated troponin [R79.89]  Procedure(s) (LRB):  Left heart cath / coronary angiography (N/A)  Percutaneous coronary intervention (N/A)  4 Days Post-Op  Inpatient: Payor: [employer] / Plan: [employer] MEDICARE-ADVANTAGE PPO / Product Type: Medicare /     ASSESSMENT:     REHAB RECOMMENDATIONS:   Recommendation to date pending progress:  Setting:  Short-term Rehab    Equipment:    To Be Determined     ASSESSMENT:  Mr. Zuñiga was supine in bed and agreeable to PT.  He required min-A for supine to sit today with good sitting balance.  Pt was able to stand with SBA/RW and fair standing balance.  He ambulated in hackett with supervision using RW.  He was also given cuing to increased B LE step length today.  Pt progressed in ambulation and transfers today.       SUBJECTIVE: 
ACUTE PHYSICAL THERAPY GOALS:   (Developed with and agreed upon by patient and/or caregiver.)  (1.) Katarina Zuñiga  will move from supine to sit and sit to supine , scoot up and down, and roll side to side with STAND BY ASSIST within 7 treatment day(s).    (2.) Katarina Zuñiga will transfer from bed to chair and chair to bed with STAND BY ASSIST using the least restrictive device within 7 treatment day(s).    (3.) Katarina Zuñiga will ambulate with STAND BY ASSIST for 150 feet with the least restrictive device within 7 treatment day(s).   (4.) Katarina Zuñiga will perform standing static and dynamic balance activities x 5 minutes with STAND BY ASSIST to improve safety within 7 treatment day(s).  (5.) Katarina Zuñiga will perform therapeutic exercises x 10 min for HEP with STAND BY ASSIST to improve strength, endurance, and functional mobility within 7 treatment day(s).      PHYSICAL THERAPY: Daily Note AM   (Link to Caseload Tracking: PT Visit Days : 6  Time In/Out PT Charge Capture  Rehab Caseload Tracker  Orders    Katarina Zuñiga is a 58 y.o. male   PRIMARY DIAGNOSIS: Elevated troponin  Elevated troponin [R79.89]  Procedure(s) (LRB):  Left heart cath / coronary angiography (N/A)  Percutaneous coronary intervention (N/A)  6 Days Post-Op  Inpatient: Payor: [employer] / Plan: [employer] MEDICARE-ADVANTAGE PPO / Product Type: Medicare /     ASSESSMENT:     REHAB RECOMMENDATIONS:   Recommendation to date pending progress:  Setting:  Short-term Rehab    Equipment:    To Be Determined     ASSESSMENT:  Mr. Zuñiga was supine in bed upon entering the room and agreeable to therapy. Today, pt demonstrates slow but continued progress toward established goals. This session, pt required Min (A) for bed mobility while requiring CG(A) and additional use of RW/gait belt for ambulation of 100'x1 c standing break. While moving on their feet, pt cont to ambulate c 
ACUTE PHYSICAL THERAPY GOALS:   (Developed with and agreed upon by patient and/or caregiver.)  (1.) Katarina Zuñiga  will move from supine to sit and sit to supine , scoot up and down, and roll side to side with STAND BY ASSIST within 7 treatment day(s).    (2.) Katarina Zuñiga will transfer from bed to chair and chair to bed with STAND BY ASSIST using the least restrictive device within 7 treatment day(s).    (3.) Katarina Zuñiga will ambulate with STAND BY ASSIST for 150 feet with the least restrictive device within 7 treatment day(s).   (4.) Katarina Zuñiga will perform standing static and dynamic balance activities x 5 minutes with STAND BY ASSIST to improve safety within 7 treatment day(s).  (5.) Katarina Zuñiga will perform therapeutic exercises x 10 min for HEP with STAND BY ASSIST to improve strength, endurance, and functional mobility within 7 treatment day(s).      PHYSICAL THERAPY: Daily Note PM   (Link to Caseload Tracking: PT Visit Days : 3  Time In/Out PT Charge Capture  Rehab Caseload Tracker  Orders    Katarina Zuñiga is a 58 y.o. male   PRIMARY DIAGNOSIS: Elevated troponin  Elevated troponin [R79.89]  Procedure(s) (LRB):  Left heart cath / coronary angiography (N/A)  Percutaneous coronary intervention (N/A)  2 Days Post-Op  Inpatient: Payor: [employer] / Plan: [employer] MEDICARE-ADVANTAGE PPO / Product Type: Medicare /     ASSESSMENT:     REHAB RECOMMENDATIONS:   Recommendation to date pending progress:  Setting:  Home Health Therapy    Equipment:    To Be Determined     ASSESSMENT:  Mr. Zuñiga was supine in bed and agreeable to PT.  He required min-modA for supine to sit today with good sitting balance.  Pt was able to stand with CGA/RW and fair standing balance.  He ambulated in hackett with supervision using RW and chair follow.  Pt took one seated rest break during ambulation.  He was also given cuing to increased B LE step length 
BP 86/56. Asymptomatic. RENATA Weinstein notified. No new orders received.   
Comprehensive Nutrition Assessment    Type and Reason for Visit: Reassess  Oral Nutrition Supplements (Cardiology) - RN generated  \" Pt has decreased appetite at home per wife, pt has lost strength and developed sacral wound.\"    Nutrition Recommendations/Plan:   Meals and Snacks:  Diet: Continue current order  Nutrition Supplement Therapy:  Medical food supplement therapy:  Change Glucerna Shake three times per day (this provides 220 kcal and 10 grams protein per bottle) and Pa twice per day (this provides 90 kcal and 2.5 grams protein per packet)     Malnutrition Assessment:  Malnutrition Status: At risk for malnutrition (Comment) (decreased ability to prepare meals, recall PTA, new wound)    No wasting  Nutrition Assessment:  Nutrition History: Wife provides history. She states that patient has a tremor and uses a walker thus difficulty preparing things to eat. She states if she is home for breakfast he will eat a good breakfast. Otherwise he may just drink and Ensure. She tries to prepare him something for lunch before she goes to work, if not he will have another Ensure and a pack of crackers or sandwich.. She usually cooks a full dinner or they will eat out. They both deny weight changes.     Do You Have Any Cultural, Congregation, or Ethnic Food Preferences?: No   Weight History: 3/17/23 163#, 4/3/23 168#, 11/6/23 182#, 2/12/24 173#  Nutrition Background:   Wound Type:  (sacral wound per notes, WC assessment pending)   PMH significant for MI, CAD< COPD, CHF, HLD, TIA, DM. He present to outside ER with worsening weakness and SOB.  He was transferred to [facility] for cardiology with elevated troponin. 3/16- cardiac cath  Nutrition Interval:  Pt seen sitting in bed with family present. He stated he is not eating much due to poor appetite. He reports he just wants to leave the hospital and he thinks this is attributing to poor appetite. Pt stated he drinks some glucerna and some Pa. RD observed many glucernas on 
Comprehensive Nutrition Assessment    Type and Reason for Visit: Reassess  Oral Nutrition Supplements (Cardiology) - RN generated  \" Pt has decreased appetite at home per wife, pt has lost strength and developed sacral wound.\"    Nutrition Recommendations/Plan:   Meals and Snacks:  Diet: Liberalize to 4 CHO choices  Nutrition Supplement Therapy:  Medical food supplement therapy:  Initiate Glucerna Shake once per day (this provides 220 kcal and 10 grams protein per bottle) and Pa twice per day (this provides 90 kcal and 2.5 grams protein per packet)     Malnutrition Assessment:  Malnutrition Status: At risk for malnutrition (Comment) (decreased ability to prepare meals, recall PTA, new wound)    No wasting  Nutrition Assessment:  Nutrition History: Wife provides history. She states that patient has a tremor and uses a walker thus difficulty preparing things to eat. She states if she is home for breakfast he will eat a good breakfast. Otherwise he may just drink and Ensure. She tries to prepare him something for lunch before she goes to work, if not he will have another Ensure and a pack of crackers or sandwich.. She usually cooks a full dinner or they will eat out. They both deny weight changes.     Do You Have Any Cultural, Restorationist, or Ethnic Food Preferences?: No   Weight History: 3/17/23 163#, 4/3/23 168#, 11/6/23 182#, 2/12/24 173#  Nutrition Background:   Wound Type:  (sacral wound per notes, WC assessment pending)   PMH significant for MI, CAD< COPD, CHF, HLD, TIA, DM. He present to outside ER with worsening weakness and SOB.  He was transferred to [facility] for cardiology with elevated troponin. 3/16- cardiac cath  Nutrition Interval:  Patient reclined in bed. He endorses at good appetite. He states he does drink protein shakes and pa when it comes but inconsistent. Seems it was cancelled 3/16 due to NPO for cath.     Current Nutrition Therapies:  ADULT DIET; Regular; No Added Salt (3-4 gm)    Current 
GOALS:  LTG: Patient will tolerate least restrictive diet without overt signs or symptoms of airway compromise.   STG: Patient will tolerate regular diet and thin liquids without overt signs or symptoms of airway compromise.   STG: Patient will participate in modified barium swallow study as clinically indicated.     SPEECH LANGUAGE PATHOLOGY: DYSPHAGIA Initial Assessment    Acknowledge Order  I  Therapy Time  I   Charges     I  Rehab Caseload Tracker      NAME: Katarina Zuñiga  : 1965  MRN: [id number]    ADMISSION DATE: 3/12/2024  PRIMARY DIAGNOSIS: Elevated troponin    ICD-10: Treatment Diagnosis: R13.12 Dysphagia, Oropharyngeal Phase    RECOMMENDATIONS   Diet:    Regular Consistency  Thin Liquids    Medication: as tolerated   Compensatory Swallowing Strategies:   Upright as possible for all oral intake   Therapeutic Intervention:   Patient/family education  Dysphagia treatment   Patient continues to require skilled intervention:  Yes. Recommend ongoing speech therapy services during this hospitalization.     Anticipated Discharge Needs: Ongoing speech therapy is recommended at next level of care.      ASSESSMENT    Patient presents with lingual movements/tremor during oral motor assessment. Patient presents with audible congestion/throat clearing prior to PO and at the end of the ST session. Patient presented with no overt s/sx of aspiration as can be judged at bedside with thin liquids and coarse solids. Patient's wife states concern for patient's processing abilities and memory difficulties.    Recommend regular diet and thin liquids. Recommend ST to f/u for cognitive-communication assessment.    GENERAL    Subjective: Patient awake, in-bed with CPAP in place upon entry. Patient stated no current difficulty with swallowing. Wife stated patient has been having difficulty with hearing, processing, and memory. Patient agreeable to ST and PO trials reluctantly, but wife encouraged patient to 
Medication reconciliation complete. RENATA Foreman notified  
NEUROSURGERY PLAN OF CARE NOTE:     Chart and Imaging Reviewed:     Katarina Zuñiga 58 y.o. male presented to [facility] with complaints of left-sided weakness and generalized sensorimotor deficits.  The patient had an elevated troponin and was evaluated with an [facility] that demonstrated diffuse nonobstructive coronary artery disease of the LAD and circumflex, high-grade ruptured plaque in the right RCA with partially occlusive thrombus.  He underwent stent placement and was started on dual antiplatelet therapy with aspirin and Brilinta. Heparin drip.  I have independently reviewed and interpreted the patient's MRI brain which demonstrates a focal area of T1 hypointensity and T2 flair hyperintensity in the white matter of the right frontal lobe in the in the region of the premotor cortex that is nonenhancing and without significant surrounding mass effect.  There is no brain compression or midline shift there is no evidence of hydrocephalus or intracranial hemorrhage.  At this time given the patient's heart catheterization requiring stent placement and dual antiplatelet therapy he would not be a candidate for intracranial biopsy in the acute setting.  The findings on the MRI could be consistent with a possible low-grade glioma and this is a not enhancing.  Recommend outpatient neurosurgical follow-up with repeat MRI brain surveillance imaging in a close follow-up with repeat MRI brain with and without contrast in 3 months.  No acute neurosurgical intervention is necessary at this time.  Please call [facility]. upon discharge to set up a follow-up appointment.    I spent a total of 5-10 minutes of medical consultative discussion and review.     Margarito Cruz MD, FAANS  Neurosurgeon  [facility]  [facility]         
Notified that pt's troponin elevated at 1,062.0. Notified RENATA Collado. Will trend labs.   
Patient has home CPAP machine. Patient stated that he does not need RT assistance.   
Patient has home CPAP machine. Patient stated that he doesn't need RT assistance.   
Pending placement. Seems we are not the primary team. Sorry I would've been happy to do so - in the future please reach out to me directly if I am consulting or in consult orders add resume care as primary. We will sign off for now.  
Per Kellen from infection prevention pt does not need to be on airborne precautions  
Pt's BP 91/66 with previous reading of 83/57. Pt states he is non-symptomatic. Notified Kellen YANEZ, orders given to hold lisinopril and norco for now. Will continue to monitor.   
SPEECH LANGUAGE PATHOLOGY    Speech therapy consulted to eval/treat for dysphagia. When engaged during PM patient resting and family reports patient seen by OT prior to clinician's arrival, thus patient allowed to rest. Speech therapy will attempt to eval as patient is available.     Hector Parrish M.S., CCC-SLP    
TRANSFER - IN REPORT:    Verbal report received from Doris NEAL on Katarina Zuñiga being received from [facility] (ED) for routine progression of care.     Report consisted of patient’s Situation, Background, Assessment and Recommendations(SBAR).     Information from the following report(s) ED summary, SBAR was reviewed. Opportunity for questions and clarification was provided.      Assessment completed upon patient’s arrival to unit and care assumed.     Patient received to room 333. Patient connected to monitor and assessment completed. Plan of care reviewed. Patient oriented to room and call light. Patient aware to use call light to communicate any chest pain or needs.     
TRANSFER - OUT REPORT:    [facility] with Dr. Wallace  Access: Right radial   1 stent to RCA    Radial band applied to right radial with 12 ml in band  No bleeding or hematoma site soft    MAR  2 mg versed  5,000 units heparin   Angiomax bolus & gtt; stopped at 0930  180 mg Brilinta   81 mg Aspirin    Verbal report given to Dina on Katarina Zuñiga  being transferred to [facility] for routine progression of patient care       Report consisted of patient's Situation, Background, Assessment and Recommendations(SBAR).     Information from the following report(s) Nurse Handoff Report and MAR was reviewed with the receiving nurse.    Opportunity for questions and clarification was provided.      Patient transported with:  Registered Nurse      
TRANSFER - OUT REPORT:    Verbal report given to Snehal NEAL on Katarina Zuñiga  being transferred to SNF for routine progression of patient care   (discharge)    Report consisted of patient's Situation, Background, Assessment and   Recommendations(SBAR).     Information from the following report(s) Nurse Handoff Report, ED Encounter Summary, MAR, Recent Results, Cardiac Rhythm SR, and Neuro Assessment was reviewed with the receiving nurse.         Opportunity for questions and clarification was provided.      Patient transported with: Family to facility    Discharge instructions given to patient and family. Opportunity for questions provided. Patient verbalized understanding. No questions at this time. IV x1 and tele box removed.        
VANCO DAILY FOLLOW UP NOTE  [facility]   Pharmacy Pharmacokinetic Monitoring Service - Vancomycin    Consulting Provider: Dr. Baxter   Indication: MRSA pneumonia  Target Concentration: Goal AUC/SALIMA 400-600 mg*hr/L  Day of Therapy: 5 of 7   Additional Antimicrobials: none    Pertinent Laboratory Values:   Wt Readings from Last 1 Encounters:   03/20/24 80.2 kg (176 lb 14.4 oz)     Temp Readings from Last 1 Encounters:   03/20/24 97.3 °F (36.3 °C) (Axillary)     Recent Labs     03/18/24  0607 03/19/24  0556   BUN 15 15   CREATININE 0.90 0.80   WBC 9.0 9.4     Estimated Creatinine Clearance: 98 mL/min (based on SCr of 0.8 mg/dL).    Lab Results   Component Value Date/Time    VANCORANDOM 27.3 03/18/2024 06:07 AM       MRSA Nasal Swab: MRSA pneumonia    Assessment:  Date/Time Dose Concentration AUC   3/18 0607 1250 mg q12h 27.3 761   Note: Serum concentrations collected for AUC dosing may appear elevated if collected in close proximity to the dose administered, this is not necessarily an indication of toxicity    Plan:  Current dosing regimen is therapeutic  Continue current dose of vancomycin 1250 mg IV every 18 hours  Repeat vancomycin concentrations will be ordered as clinically appropriate   Pharmacy will continue to monitor patient and adjust therapy as indicated    Thank you for the consult,  Roula Tobias RPH       
VANCO DAILY FOLLOW UP NOTE  [facility]   Pharmacy Pharmacokinetic Monitoring Service - Vancomycin    Consulting Provider: Dr. Baxter   Indication: MRSA pneumonia  Target Concentration: Goal AUC/SALIMA 400-600 mg*hr/L  Day of Therapy: 2 of 7   Additional Antimicrobials: none    Pertinent Laboratory Values:   Wt Readings from Last 1 Encounters:   03/17/24 77.6 kg (171 lb)     Temp Readings from Last 1 Encounters:   03/17/24 97.9 °F (36.6 °C)     Recent Labs     03/15/24  0549 03/16/24  0326 03/17/24  0450   BUN 12 13 15   CREATININE 0.70* 0.70* 0.80   WBC 8.4 7.4 8.1     Estimated Creatinine Clearance: 96 mL/min (based on SCr of 0.8 mg/dL).    No results found for: \"VANCOTROUGH\", \"VANCORANDOM\"    MRSA Nasal Swab: MRSA pneumonia    Assessment:  Date/Time Dose Concentration AUC         Note: Serum concentrations collected for AUC dosing may appear elevated if collected in close proximity to the dose administered, this is not necessarily an indication of toxicity    Plan:  Dosing recommendations based on Bayesian software  Continue vancomycin 1250 mg IV q12h  Anticipated AUC of 539 and trough concentration of 14.1 at steady state  Renal labs as indicated   Vancomycin concentrations will be ordered as clinically appropriate   Pharmacy will continue to monitor patient and adjust therapy as indicated    Thank you for the consult,  Betty Persaud [facility]  
VANCO DAILY FOLLOW UP NOTE  [facility]   Pharmacy Pharmacokinetic Monitoring Service - Vancomycin    Consulting Provider: Dr. Baxter   Indication: MRSA pneumonia  Target Concentration: Goal AUC/SALIMA 400-600 mg*hr/L  Day of Therapy: 7 of 7   Additional Antimicrobials: none    Pertinent Laboratory Values:   Wt Readings from Last 1 Encounters:   03/22/24 79 kg (174 lb 1.6 oz)     Temp Readings from Last 1 Encounters:   03/22/24 98.1 °F (36.7 °C) (Oral)     Recent Labs     03/22/24  0416   BUN 26*   CREATININE 0.90   WBC 11.9*     Estimated Creatinine Clearance: 86 mL/min (based on SCr of 0.9 mg/dL).    Lab Results   Component Value Date/Time    VANCORANDOM 27.3 03/18/2024 06:07 AM       MRSA Nasal Swab: MRSA pneumonia    Assessment:  Date/Time Dose Concentration AUC   3/18 0607 1250 mg q12h 27.3 761   Note: Serum concentrations collected for AUC dosing may appear elevated if collected in close proximity to the dose administered, this is not necessarily an indication of toxicity    Plan:  Current dosing regimen is therapeutic  Continue current dose of vancomycin 1250 mg IV every 18 hours  Repeat vancomycin concentrations will be ordered as clinically appropriate   Pharmacy will continue to monitor patient and adjust therapy as indicated    Thank you for the consult,  Roula Tobias RPH           
VANCO DAILY FOLLOW UP NOTE  [facility]   Pharmacy Pharmacokinetic Monitoring Service - Vancomycin    Consulting Provider: Dr. Baxter   Indication: MRSA pneumonia  Target Concentration: Goal AUC/SALIMA 400-600 mg*hr/L  Day of Therapy: 6 of 7   Additional Antimicrobials: none    Pertinent Laboratory Values:   Wt Readings from Last 1 Encounters:   03/21/24 78.8 kg (173 lb 11.2 oz)     Temp Readings from Last 1 Encounters:   03/21/24 98 °F (36.7 °C) (Oral)     Recent Labs     03/19/24  0556   BUN 15   CREATININE 0.80   WBC 9.4     Estimated Creatinine Clearance: 97 mL/min (based on SCr of 0.8 mg/dL).    Lab Results   Component Value Date/Time    VANCORANDOM 27.3 03/18/2024 06:07 AM       MRSA Nasal Swab: MRSA pneumonia    Assessment:  Date/Time Dose Concentration AUC   3/18 0607 1250 mg q12h 27.3 761   Note: Serum concentrations collected for AUC dosing may appear elevated if collected in close proximity to the dose administered, this is not necessarily an indication of toxicity    Plan:  Current dosing regimen is therapeutic  Continue current dose of vancomycin 1250 mg IV every 18 hours  Repeat vancomycin concentrations will be ordered as clinically appropriate   Pharmacy will continue to monitor patient and adjust therapy as indicated    Thank you for the consult,  Roula Tobias RPH         
VANCO DAILY FOLLOW UP NOTE  [facility]   Pharmacy Pharmacokinetic Monitoring Service - Vancomycin    Consulting Provider: Dr. Baxter   Indication: MRSA pneumonia  Target Concentration: Goal AUC/SALIMA 400-600 mg*hr/L  Day of Therapy: 4 of 7   Additional Antimicrobials: none    Pertinent Laboratory Values:   Wt Readings from Last 1 Encounters:   03/19/24 80.3 kg (177 lb)     Temp Readings from Last 1 Encounters:   03/19/24 97.3 °F (36.3 °C) (Axillary)     Recent Labs     03/17/24  0450 03/18/24  0607 03/19/24  0556   BUN 15 15 15   CREATININE 0.80 0.90 0.80   WBC 8.1 9.0 9.4     Estimated Creatinine Clearance: 98 mL/min (based on SCr of 0.8 mg/dL).    Lab Results   Component Value Date/Time    VANCORANDOM 27.3 03/18/2024 06:07 AM       MRSA Nasal Swab: MRSA pneumonia    Assessment:  Date/Time Dose Concentration AUC   3/18 0607 1250 mg q12h 27.3 761   Note: Serum concentrations collected for AUC dosing may appear elevated if collected in close proximity to the dose administered, this is not necessarily an indication of toxicity    Plan:  Current dosing regimen is therapeutic  Continue current dose of vancomycin 1250 mg IV every 18 hours  Repeat vancomycin concentrations will be ordered as clinically appropriate   Pharmacy will continue to monitor patient and adjust therapy as indicated    Thank you for the consult,  Roula Tobias ARNOLD     
VANCO DAILY FOLLOW UP NOTE  [facility]   Pharmacy Pharmacokinetic Monitoring Service - Vancomycin    Consulting Provider: Dr. Baxter   Indication: MRSA pneumonia  Target Concentration: Goal AUC/SALIMA 400-600 mg*hr/L  Day of Therapy: 3 of 7   Additional Antimicrobials: none    Pertinent Laboratory Values:   Wt Readings from Last 1 Encounters:   03/18/24 78.9 kg (173 lb 14.4 oz)     Temp Readings from Last 1 Encounters:   03/18/24 97.5 °F (36.4 °C) (Axillary)     Recent Labs     03/16/24  0326 03/17/24  0450 03/18/24  0607   BUN 13 15 15   CREATININE 0.70* 0.80 0.90   WBC 7.4 8.1 9.0     Estimated Creatinine Clearance: 86 mL/min (based on SCr of 0.9 mg/dL).    Lab Results   Component Value Date/Time    VANCORANDOM 27.3 03/18/2024 06:07 AM       MRSA Nasal Swab: MRSA pneumonia    Assessment:  Date/Time Dose Concentration AUC   3/18 0607 1250 mg q12h 27.3 761   Note: Serum concentrations collected for AUC dosing may appear elevated if collected in close proximity to the dose administered, this is not necessarily an indication of toxicity    Plan:  Current dosing regimen is supra-therapeutic  Decrease dose to 1250 mg every 18 hours for predicted AUC/Tr of 523/13.4  Repeat vancomycin concentrations will be ordered as clinically appropriate   Pharmacy will continue to monitor patient and adjust therapy as indicated    Thank you for the consult,  Emma Das [facility]  
VANCO DAILY FOLLOW UP NOTE  [facility]   Pharmacy Pharmacokinetic Monitoring Service - Vancomycin    Consulting Provider: Dr. Baxter   Indication: MRSA pneumonia  Target Concentration: Goal AUC/SALIMA 400-600 mg*hr/L  Day of Therapy: 1 of 7   Additional Antimicrobials: none    Pertinent Laboratory Values:   Wt Readings from Last 1 Encounters:   03/16/24 75.8 kg (167 lb 1.6 oz)     Temp Readings from Last 1 Encounters:   03/16/24 98.4 °F (36.9 °C) (Oral)     Recent Labs     03/14/24  0411 03/15/24  0549 03/16/24  0326   BUN 10 12 13   CREATININE 0.70* 0.70* 0.70*   WBC 8.8 8.4 7.4     Estimated Creatinine Clearance: 101 mL/min (A) (based on SCr of 0.7 mg/dL (L)).    No results found for: \"VANCOTROUGH\", \"VANCORANDOM\"    MRSA Nasal Swab: MRSA pneumonia    Assessment:  Date/Time Dose Concentration AUC         Note: Serum concentrations collected for AUC dosing may appear elevated if collected in close proximity to the dose administered, this is not necessarily an indication of toxicity    Plan:  Dosing recommendations based on Bayesian software  Start vancomycin 2000 mg IV x 1 followed by 1250 mg IV q12h  Anticipated AUC of 539 and trough concentration of 14.1 at steady state  Renal labs as indicated   Vancomycin concentrations will be ordered as clinically appropriate   Pharmacy will continue to monitor patient and adjust therapy as indicated    Thank you for the consult,  Betty Persaud [facility]  
am  3/14/2024 6:45 AM    Admit Date: 3/12/2024    Admit Diagnosis: Elevated troponin [R79.89]      Subjective:    Patient :     Patient is feeling well this morning. He does not have chest pain. No other concerns at this time.    Objective:    /78   Pulse 82   Temp 98.2 °F (36.8 °C) (Oral)   Resp 18   Ht 1.676 m (5' 6\")   Wt 77.5 kg (170 lb 14.4 oz)   SpO2 94%   BMI 27.58 kg/m²     ROS:  General ROS: negative for - chills  Hematological and Lymphatic ROS: negative for - blood clots or jaundice  Respiratory ROS: no cough, shortness of breath, or wheezing  Cardiovascular ROS: no chest pain or dyspnea on exertion  Gastrointestinal ROS: no abdominal pain, change in bowel habits, or black or bloody stools  Neurological ROS: no TIA or stroke symptoms    Physical Exam:      Physical Examination: General appearance - Appearance: alert, well appearing, and in no distress.   Neck/lymph - supple, no significant adenopathy  Chest/CV - clear to auscultation, no wheezes, rales or rhonchi, symmetric air entry  Heart - normal rate, regular rhythm, normal S1, S2, no murmurs, rubs, clicks or gallops  Abdomen/GI - soft, nontender, nondistended, no masses or organomegaly   Musculoskeletal - no joint tenderness, deformity or swelling  Extremities - peripheral pulses normal, no pedal edema, no clubbing or cyanosis  Skin - normal coloration and turgor, no rashes, no suspicious skin lesions noted    Current Facility-Administered Medications   Medication Dose Route Frequency    traMADol (ULTRAM) tablet 50 mg  50 mg Oral Q6H PRN    ALPRAZolam (XANAX) tablet 1 mg  1 mg Oral BID PRN    sodium chloride flush 0.9 % injection 5-40 mL  5-40 mL IntraVENous 2 times per day    sodium chloride flush 0.9 % injection 5-40 mL  5-40 mL IntraVENous PRN    0.9 % sodium chloride infusion   IntraVENous PRN    potassium chloride (KLOR-CON M) extended release tablet 40 mEq  40 mEq Oral PRN    Or    potassium bicarb-citric acid (EFFER-K) 
am  3/20/2024 7:29 AM    Admit Date: 3/12/2024    Admit Diagnosis: Elevated troponin [R79.89]      Subjective:    Patient : Patient resting comfortably in bed this morning. Denies chest pain, palpitations, or SOB. Ok to discharge, we are just waiting for a rehab bed to open up. No acute overnight events.     Objective:    BP 97/65   Pulse 76   Temp 97.3 °F (36.3 °C) (Axillary)   Resp 17   Ht 1.676 m (5' 6\")   Wt 80.2 kg (176 lb 14.4 oz)   SpO2 95%   BMI 28.55 kg/m²     ROS:  General ROS: negative for - chills  Hematological and Lymphatic ROS: negative for - blood clots or jaundice  Respiratory ROS: no cough, shortness of breath, or wheezing  Cardiovascular ROS: no chest pain or dyspnea on exertion  Gastrointestinal ROS: no abdominal pain, change in bowel habits, or black or bloody stools  Neurological ROS: no TIA or stroke symptoms    Physical Exam:  Physical Examination: General appearance - Appearance: alert, well appearing, and in no distress.   Neck/lymph - supple, no significant adenopathy  Chest/CV - clear to auscultation, no wheezes, rales or rhonchi, symmetric air entry  Heart - normal rate, regular rhythm, normal S1, S2, no murmurs, rubs, clicks or gallops  Abdomen/GI - soft, nontender, nondistended, no masses or organomegaly   Musculoskeletal - no joint tenderness, deformity or swelling  Extremities - peripheral pulses normal, no pedal edema, no clubbing or cyanosis  Skin - normal coloration and turgor, no rashes, no suspicious skin lesions noted    Current Facility-Administered Medications   Medication Dose Route Frequency    vancomycin (VANCOCIN) 1250 mg in sodium chloride 0.9% 250 mL IVPB  1,250 mg IntraVENous Q18H    oxymetazoline (AFRIN) 0.05 % nasal spray 2 spray  2 spray Each Nostril BID PRN    mineral oil-hydrophilic petrolatum (AQUAPHOR) ointment   Topical BID PRN    divalproex (DEPAKOTE) DR tablet 1,000 mg  1,000 mg Oral Nightly    ticagrelor (BRILINTA) tablet 90 mg  90 mg Oral BID    
am  3/25/2024 9:09 AM    Admit Date: 3/12/2024    Admit Diagnosis: Elevated troponin [R79.89]      Subjective:   Katarina Zuñiga is a 58 y.o. male with history of CAD s/p PCI/stents 2013, COPD, LHC 2016, HTN, dyslipidemia, DM who presented from [facility] on 3/11 with generalized weakness of 1 month duration associated with dyspnea and cough of 2-week duration that have worsened. At [facility] ED, troponin elevated 0.39-0.71.  CXR shows right lung base opacity concerning for infection. CTA chest shows bibasilar groundglass and nodular opacities; LLL consolidation; bilateral mucous plugging; no acute PE.    Patient was transferred to [facility] and admitted to cardiology service on 3/12 for elevated troponin.  Hospitalist was consulted for generalized weakness and pneumonia. MRI Brain on 3/13 showed evidence of right frontoparietal gyral abnormality.Neruo recommended followup with repeat imaging and is not a candidate for biopsy due to DAPT. s/p status post PCI RCA 3/16/2024 on DAPT. Echo showed EF 60-65%.    No acute events overnight. No complaints at this time. Denies fever, SOB, Chest pain/pressure/tightness. Offered bed at [facility] and approved for transfer to [facility] on 3/25. Patient ready for discharge.    Objective:    /77   Pulse 77   Temp 98.5 °F (36.9 °C) (Oral)   Resp 18   Ht 1.676 m (5' 6\")   Wt 77.2 kg (170 lb 3.2 oz)   SpO2 98%   BMI 27.47 kg/m²     ROS:  General ROS: negative for - chills  Hematological and Lymphatic ROS: negative for - blood clots or jaundice  Respiratory ROS: no cough, shortness of breath, or wheezing  Cardiovascular ROS: no chest pain or dyspnea on exertion  Gastrointestinal ROS: no abdominal pain, change in bowel habits, or black or bloody stools  Neurological ROS: no TIA or stroke symptoms    Physical Exam:    Physical Examination: General appearance - Appearance: alert, well appearing, and in no distress. A&O x 3.  Neck/lymph - supple, no significant 
changes or side effects.            We will continue to follow the patient's blood            pressure closely.      COPD (chronic obstructive pulmonary disease) (HCC)            Last Assessment & Plan:              Formatting of this note might be different from            the original. Disease state was reviewed,            considered chronic and currently unchanged/stable.            Continue current therapy.  Followed by pulmonology      Post traumatic stress disorder (PTSD)      Gastroesophageal reflux disease without esophagitis      MARSHA on CPAP            Last Assessment & Plan:              Formatting of this note might be different from            the original. The patient be continued on CPAP per            protocol.  I will also place him on supplemental            oxygen via nasal cannula during the day.      Coronary atherosclerosis of native coronary vessel            MI 5/16/2013 stent to LAD            Jun 2013: repeat cath one month after stent widely            patent stent, no other             obstructive disease                  Chronic anxiety        
q6h prn--verified via [place] scripts     GERD  Continue home PPI     HTN  Hold home lisinopril, Coreg d/t borderline hypotension     MARSHA  CPAP nightly    Diet:  ADULT DIET; Regular; No Added Salt (3-4 gm)  VTE prophylaxis: Lovenox  Code status: Full Code      Non-peripheral Lines and Tubes (if present):          Telemetry (if present):  Cardiac/Telemetry Monitor On: Portable telemetry pack applied        Hospital Problems:  Principal Problem:    Elevated troponin  Active Problems:    Chronic anxiety    Coronary atherosclerosis of native coronary vessel    Pneumonia of left lower lobe due to infectious organism    Acquired tracheomalacia    Mucus plugging of bronchi    Failure to thrive in adult    Post traumatic stress disorder (PTSD)    COPD (chronic obstructive pulmonary disease) ([facility])    Essential hypertension    Gastroesophageal reflux disease without esophagitis    MARSHA on CPAP    Abnormal brain MRI    Pneumonia of both lower lobes due to methicillin resistant Staphylococcus aureus (MRSA) (Former[facility])  Resolved Problems:    * No resolved hospital problems. *      Objective:   Patient Vitals for the past 24 hrs:   Temp Pulse Resp BP SpO2   03/17/24 1148 97.9 °F (36.6 °C) 77 16 (!) 90/58 95 %   03/17/24 1044 -- -- 14 -- --   03/17/24 0751 97.9 °F (36.6 °C) 66 16 90/66 95 %   03/17/24 0415 97.3 °F (36.3 °C) 71 16 95/61 96 %   03/16/24 2333 98.4 °F (36.9 °C) 86 16 98/70 94 %   03/16/24 2023 -- 86 18 -- 94 %   03/16/24 2005 97.7 °F (36.5 °C) 95 16 98/66 96 %   03/16/24 1715 -- 93 -- -- --   03/16/24 1700 -- 84 -- (!) 81/67 --   03/16/24 1645 -- (!) 102 -- -- --   03/16/24 1630 -- 97 -- 92/72 --   03/16/24 1618 97.9 °F (36.6 °C) (!) 102 18 102/74 94 %   03/16/24 1615 -- (!) 103 -- -- --   03/16/24 1600 -- 66 -- (!) 100/55 --   03/16/24 1545 -- 98 -- -- --   03/16/24 1531 -- -- 18 -- --   03/16/24 1530 -- 90 -- (!) 82/63 --   03/16/24 1515 -- 90 -- -- --   03/16/24 1500 -- 86 -- 105/61 --   03/16/24 1445 -- 92 -- -- --   03/16/24 
  Distance   200 feet then 50'    DME Rolling Walker    Gait Quality Decreased aleena , Decreased step clearance, and Decreased step length    Weightbearing Status      Stairs      I=Independent, Mod I=Modified Independent, S=Supervision, SBA=Standby Assistance, CGA=Contact Guard Assistance,   Min=Minimal Assistance, Mod=Moderate Assistance, Max=Maximal Assistance, Total=Total Assistance, NT=Not Tested    PLAN:   FREQUENCY AND DURATION: 3 times/week for duration of hospital stay or until stated goals are met, whichever comes first.    TREATMENT:   TREATMENT:   Therapeutic Activity (15 Minutes): Therapeutic activity included Sit to Supine, Scooting, Transfer Training, Ambulation on level ground, Sitting balance , and Standing balance to improve functional Activity tolerance, Balance, Mobility, and Strength.    TREATMENT GRID:  N/A    AFTER TREATMENT PRECAUTIONS: Alarm Activated, Bed, Bed/Chair Locked, Call light within reach, Heels floated, Needs within reach, RN notified, Side rails x3, and Visitors at bedside    INTERDISCIPLINARY COLLABORATION:  RN/ PCT and PT/ PTA    EDUCATION:      TIME IN/OUT:  Time In: 1135  Time Out: 1150  Minutes: 15    Dina Jamison PTA    
BID    albuterol (PROVENTIL) (2.5 MG/3ML) 0.083% nebulizer solution 2.5 mg  2.5 mg Nebulization 4x Daily RT    sodium chloride (Inhalant) 3 % nebulizer solution 4 mL  4 mL Nebulization BID RT    cefTRIAXone (ROCEPHIN) 2,000 mg in sodium chloride 0.9 % 50 mL IVPB (mini-bag)  2,000 mg IntraVENous Q24H    azithromycin (ZITHROMAX) 500 mg in sodium chloride 0.9 % 250 mL IVPB ([id number])  500 mg IntraVENous Q24H    aspirin EC tablet 81 mg  81 mg Oral Daily    [Held by provider] lisinopril (PRINIVIL;ZESTRIL) tablet 5 mg  5 mg Oral Daily       Data Review: data included in this note has been independently reviewed by the author     TELEMETRY: Normal sinus rhythm    Assessment/Plan:     Patient Active Problem List   Diagnosis    Hyperlipidemia    Chronic anxiety    Coronary atherosclerosis of native coronary vessel    Right shoulder pain    AVN of femur (HCC)    MRSA (methicillin resistant staph aureus) culture positive    Chronic systolic heart failure (HCC)    Chest pain    Acute myocardial infarction (HCC)    Avascular necrosis (HCC)    Elevated troponin    Pneumonia of left lower lobe due to infectious organism    Acquired tracheomalacia    Mucus plugging of bronchi    Failure to thrive in adult    Post traumatic stress disorder (PTSD)    Acquired hypothyroidism    COPD (chronic obstructive pulmonary disease) (HCC)    Essential hypertension    Gastroesophageal reflux disease without esophagitis    Iron deficiency anemia secondary to inadequate dietary iron intake    MARSHA on CPAP    Type 2 diabetes mellitus (HCC)     PLAN    Elevated Troponin  -continues to downtrend, pt is not experiencing chest pain  -EKG normal sinus rhythm  -echo 3/12 with EF 60-65%, normal systolic and diastolic function, and no wall motion abnormalities.  Likely type II troponin leak but will need to consider potential for diagnostic heart cath with his prior history of early onset aggressive coronary artery disease and prior 
[] []    Functional Mobility [] [] [] [] [x] [] [] [] [] [] X RW   I=Independent, Mod I=Modified Independent, S=Supervision/Setup, SBA=Standby Assistance, CGA=Contact Guard Assistance, Min=Minimal Assistance, Mod=Moderate Assistance, Max=Maximal Assistance, Total=Total Assistance, NT=Not Tested    BALANCE: Good Fair+ Fair Fair- Poor NT Comments   Sitting Static [x] [] [] [] [] []    Sitting Dynamic [] [x] [] [] [] []              Standing Static [] [x] [] [] [] []    Standing Dynamic [] [] [x] [] [] [] X RW       PLAN:     FREQUENCY/DURATION   OT Plan of Care: 3 times/week for duration of hospital stay or until stated goals are met, whichever comes first.    TREATMENT:     TREATMENT:   Neuromuscular Re-education (12 Minutes): Patient participated in neuromuscular re-education including functional reaching, weight shifting, postural training, midline training, standing tolerance activity , sitting balance activity , and activity tolerance  with minimal assistance and verbal cues to improve sitting balance, standing balance, posture, coordination, static balance, and dynamic balance in order to prepare for functional task, prepare for seated ADLs, prepare for standing ADLs, prepare for functional transfer, prepare for discharge home , and prepare for self care..     TREATMENT GRID:  N/A    AFTER TREATMENT PRECAUTIONS: Alarm Activated, Bed, Bed/Chair Locked, Call light within reach, Needs within reach, RN notified, and Visitors at bedside    INTERDISCIPLINARY COLLABORATION:  RN/ PCT, PT/ PTA, and OT/ GRIMES    EDUCATION:       TOTAL TREATMENT DURATION AND TIME:  Time In: 1509  Time Out: 1525  Minutes: 16    Lauryn Gold, OT             
cath,percutaneous (2013).  Precautions/Allergies: Ciprofloxacin, Clopidogrel, Escitalopram, Escitalopram oxalate, Levofloxacin, Pregabalin, and Sulfa antibiotics     Observations:  Alertness: Alert  Voice: WFL and low volume  Speech: Intelligible  Expressive Language: Fluent and Able to communicate wants and needs  Receptive Language: Answers yes/no questions, Follows basic commands, and Appropriately responds to questions  Cognition: Appropriately attends to clinician    Prior Dysphagia History: None per chart review  Prior Instrumental Assessment: None per chart review    Current Diet:  Regular Consistency  Thin Liquids    Respiratory Status: Room air    Pain:  Patient does not c/o pain    OBJECTIVE    Oral Motor Assessment: Labial: no impairment  Lingual: mild lingual movements/tremor present  Dentition: natural  Oral Hygiene: adequate  Vocal quality: low volume  Oropharyngeal Assessment: Patient presented with breakfast tray of thin liquids and regular solids.  Appropriate oral prep with all textures. Timely swallow initiation. Adequate oral clearing. Patient states no current difficulty with swallowing. No overt signs or symptoms of airway compromise observed with liquid or solid textures presented.     Attempted full cog assessment, however patient defers at this time. SLP provided memory strategies and communicated with patient regarding important dates to remember including , wife's , children's names and ages, etc. Patient states he has no difficulty remembering info he wants to remember. SLP educated on increased troponin's are associated with accelerated with cog decline and increased risk of dementia, patient verbalized understanding of recommendations for memory strategies.     Dysphagia Outcome and Severity Scale (TAISHA)  Score: 6 Description   [] 7 Normal in all situations   [x] 6 Within Functional Limits/ Modified independent   [] 5 Mild Dysphagia: Distant Supervision. May need one diet 
sodium chloride 0.9 % 50 mL IVPB (mini-bag)  2,000 mg IntraVENous Q24H    azithromycin (ZITHROMAX) 500 mg in sodium chloride 0.9 % 250 mL IVPB ([id number])  500 mg IntraVENous Q24H    aspirin EC tablet 81 mg  81 mg Oral Daily    [Held by provider] lisinopril (PRINIVIL;ZESTRIL) tablet 5 mg  5 mg Oral Daily       Data Review: data included in this note has been independently reviewed by the author     TELEMETRY: Normal sinus rhythm    Assessment/Plan:     Patient Active Problem List   Diagnosis    Hyperlipidemia    Chronic anxiety    Coronary atherosclerosis of native coronary vessel    Right shoulder pain    AVN of femur (HCC)    MRSA (methicillin resistant staph aureus) culture positive    Chronic systolic heart failure (HCC)    Chest pain    Acute myocardial infarction (HCC)    Avascular necrosis (HCC)    Elevated troponin    Pneumonia of left lower lobe due to infectious organism    Acquired tracheomalacia    Mucus plugging of bronchi    Failure to thrive in adult    Post traumatic stress disorder (PTSD)    Acquired hypothyroidism    COPD (chronic obstructive pulmonary disease) (HCC)    Essential hypertension    Gastroesophageal reflux disease without esophagitis    Iron deficiency anemia secondary to inadequate dietary iron intake    MARSHA on CPAP    Type 2 diabetes mellitus (HCC)    Abnormal brain MRI     PLAN    Elevated Troponin  -continues to downtrend, pt is not experiencing chest pain  -EKG normal sinus rhythm  -echo 3/12 with EF 60-65%, normal systolic and diastolic function, and no wall motion abnormalities.  -Likely type II troponin leak and prior history of CAD and cardiac interventions. Will perform heart cath for further evaluation     Pneumonia  -Pt has history of tracheomalacia and chronic mucus plugging  -on CAP treatment     CAD  -history of stents in 2012, recent left heart cath in 2016 showed mild nonobstructive CAD.  -will continue aspirin and atorvastatin 80 mg.    -Lipoprotein a and 
tuberculin injection 5 Units  5 Units IntraDERmal Once    albuterol (PROVENTIL) (2.5 MG/3ML) 0.083% nebulizer solution 2.5 mg  2.5 mg Nebulization 4x Daily RT    sodium chloride (Inhalant) 3 % nebulizer solution 4 mL  4 mL Nebulization BID RT    cefTRIAXone (ROCEPHIN) 2,000 mg in sodium chloride 0.9 % 50 mL IVPB (mini-bag)  2,000 mg IntraVENous Q24H    azithromycin (ZITHROMAX) 500 mg in sodium chloride 0.9 % 250 mL IVPB ([id number])  500 mg IntraVENous Q24H    aspirin EC tablet 81 mg  81 mg Oral Daily    [Held by provider] lisinopril (PRINIVIL;ZESTRIL) tablet 5 mg  5 mg Oral Daily     Review of Systems: Comprehensive ROS negative except in HPI  Objective:   Blood pressure (!) 85/59, pulse 99, temperature 99 °F (37.2 °C), temperature source Oral, resp. rate 16, height 1.676 m (5' 6\"), weight 77.9 kg (171 lb 11.2 oz), SpO2 96 %.   Intake/Output Summary (Last 24 hours) at 3/13/2024 1114  Last data filed at 3/13/2024 0541  Gross per 24 hour   Intake --   Output 1425 ml   Net -1425 ml     Physical Exam:   Constitutional:  the patient is well developed and in no acute distress  EENMT:  Sclera clear, pupils equal, oral mucosa moist  Respiratory: symmetric chest rise. Mild rhonchi  Cardiovascular:  RRR without M,G,R. There is no lower extremity edema.  Gastrointestinal: soft and non-tender; with positive bowel sounds.  Musculoskeletal: warm without cyanosis. Normal muscle tone.   Skin:  no jaundice or rashes, no wounds   Neurologic: symmetric strength, fluent speech  Psychiatric:  calm, appropriate, oriented x 4    Imaging: I performed an independent interpretation of the patient's images.  CXR: 3/2024: bilateral  basilar infiltrates, possible left effusion    2013 for comparison    CT Chest: report, no images available    2013      LAB:  Recent Labs     03/13/24  0323   WBC 8.1   HGB 12.4*   HCT 38.8*        Recent Labs     03/13/24  0323      K 3.7   *   CO2 21   BUN 10   CREATININE 0.80     Recent Labs 
assistance, supervision, or strategies.       Two or more diet consistencies restricted.   [] 2 Moderate-Severe Dysphagia: Maximum assistance or maximum use     of strategies with partial po intake   [] 1 Severe dysphagia- NPO. Unable to tolerate any po safely     PLAN    Duration/Frequency: No treatment indicated at this time    Rehabilitation Potential For Stated Goals: Good    Interdisciplinary Collaboration: RN/ PCT    Medical Necessity    Recommending ST at next level of care if patient is agreeable.    Education:   Patient educated on Speech therapy recommendations, Role of speech therapy, SLP plan, and Diet recommendations  Education provided to Patient, Family/Caregiver, and RN  Education response: Verbalizes understanding    Safety:   In Bed  RN notified     Therapy Time:  Time In: 1451  Time Out: 1503  Minutes: 12    Perri Hand M.S., CCC-SLP   3/20/2024 3:13 PM      
docusate (COLACE) 50 MG/5ML liquid 50 mg  50 mg Oral Daily    fluticasone (FLONASE) 50 MCG/ACT nasal spray 2 spray  2 spray Nasal Daily PRN    mometasone-formoterol (DULERA) 100-5 MCG/ACT inhaler 2 puff  2 puff Inhalation BID RT    ipratropium 0.5 mg-albuterol 2.5 mg (DUONEB) nebulizer solution 1 Dose  1 Dose Inhalation Q6H PRN    levothyroxine (SYNTHROID) tablet 50 mcg  50 mcg Oral QAM AC    OLANZapine (ZYPREXA) tablet 5 mg  5 mg Oral Nightly    pantoprazole (PROTONIX) tablet 40 mg  40 mg Oral BID    topiramate (TOPAMAX) tablet 50 mg  50 mg Oral BID    aspirin EC tablet 81 mg  81 mg Oral Daily       Data Review: data included in this note has been independently reviewed by the author     TELEMETRY: Normal sinus rhythm.    Imaging:       Inpatient Imaging:     ECHO 3/12/24 showed:     Left Ventricle: Normal left ventricular systolic function with a visually estimated EF of 60 - 65%. Left ventricle size is normal. Normal wall thickness. Normal wall motion. Normal diastolic function.    Image quality is adequate.     MRI Brain: 3/14/24  IMPRESSION:  1. Evidence of right frontoparietal gyral signal abnormality without significant  post gadolinium enhancement. No corresponding signal abnormality on the  diffusion weighted images are appreciated. A low grade glioma would remain  within differential consideration. Further correlation is recommended. A repeat  follow-up in 3 months is advised. Additional nonspecific T2 and FLAIR signal  changes caudal to the main lesion identified predominantly in the right frontal  and frontoparietal regions.  2. Sinonasal disease right maxillary antrum.     [facility] 3/16/24 Showed:    Left heart cath selective coronary angiography left ventriculogram and stenting of a culprit lesion for non-ST elevation myocardial infarction and partially thrombosed and occluded artery  1.  Diffuse nonobstructive coronary artery disease in the LAD and circumflex system  2.  High-grade culprit ruptured plaque 
tracheomalacia and chronic mucus plugging  -on CAP treatment     CAD  -history of stents in 2012, recent left heart cath in 2016 showed mild nonobstructive CAD.  -will continue aspirin and atorvastatin 80 mg.    -Lipoprotein a and apolipoprotein labs pending     Hypertension  -Blood pressure stable, coreg and lisinopril held due to concerns for hypotension.  Will reinstitute appropriate antihypertensive medications if patient's blood pressure becomes elevated  -will continue to closely monitor and restart medications as pressures allow.   Still unable to initiate appropriate beta-blocker and/or ACE inhibitor therapy     Obstructive Sleep Apnea  -Continue CPAP    If neurosurgery plans any sort of inpatient workup patient can remain in the hospital but as I suspect they probably will work him up outpatient now that he has a new stent and will be on dual antiplatelet therapy for the foreseeable future.  Will attempt to discharge today  Sandhya Manuel  I have personally seen and evaluated the patient and reviewed the students note and agree with the following assessment and plan and findings. I was present for and observed the key components of this note.  Any appropriate additions or editing of the information have been done by me.    Javier Wallace MD, FACC  Cardiology   
tablet 90 mg  90 mg Oral BID    HYDROcodone-acetaminophen (NORCO) 7.5-325 MG per tablet 1 tablet  1 tablet Oral Q6H PRN    atorvastatin (LIPITOR) tablet 80 mg  80 mg Oral Nightly    acetylcysteine (MUCOMYST) 20 % solution 600 mg  600 mg Inhalation BID RT    traMADol (ULTRAM) tablet 50 mg  50 mg Oral Q6H PRN    guaiFENesin-dextromethorphan (ROBITUSSIN DM) 100-10 MG/5ML syrup 5 mL  5 mL Oral Q4H PRN    ALPRAZolam (XANAX) tablet 1 mg  1 mg Oral BID PRN    sodium chloride flush 0.9 % injection 5-40 mL  5-40 mL IntraVENous 2 times per day    sodium chloride flush 0.9 % injection 5-40 mL  5-40 mL IntraVENous PRN    0.9 % sodium chloride infusion   IntraVENous PRN    potassium chloride (KLOR-CON M) extended release tablet 40 mEq  40 mEq Oral PRN    Or    potassium bicarb-citric acid (EFFER-K) effervescent tablet 40 mEq  40 mEq Oral PRN    Or    potassium chloride 10 mEq/100 mL IVPB (Peripheral Line)  10 mEq IntraVENous PRN    magnesium sulfate 2000 mg in 50 mL IVPB premix  2,000 mg IntraVENous PRN    ondansetron (ZOFRAN-ODT) disintegrating tablet 4 mg  4 mg Oral Q8H PRN    Or    ondansetron (ZOFRAN) injection 4 mg  4 mg IntraVENous Q6H PRN    acetaminophen (TYLENOL) tablet 650 mg  650 mg Oral Q6H PRN    Or    acetaminophen (TYLENOL) suppository 650 mg  650 mg Rectal Q6H PRN    polyethylene glycol (GLYCOLAX) packet 17 g  17 g Oral Daily PRN    enoxaparin (LOVENOX) injection 40 mg  40 mg SubCUTAneous Daily    cetirizine (ZYRTEC) tablet 10 mg  10 mg Oral Daily PRN    dicyclomine (BENTYL) capsule 20 mg  20 mg Oral BID    docusate (COLACE) 50 MG/5ML liquid 50 mg  50 mg Oral Daily    fluticasone (FLONASE) 50 MCG/ACT nasal spray 2 spray  2 spray Nasal Daily PRN    mometasone-formoterol (DULERA) 100-5 MCG/ACT inhaler 2 puff  2 puff Inhalation BID RT    ipratropium 0.5 mg-albuterol 2.5 mg (DUONEB) nebulizer solution 1 Dose  1 Dose Inhalation Q6H PRN    levothyroxine (SYNTHROID) tablet 50 mcg  50 mcg Oral QAM AC    OLANZapine 
grammatical/other typographical errors.

## 2024-03-27 NOTE — CARE COORDINATION
CM contacted Sr Manager regarding delayed decision for [employer] prior auth. JERROD informed by Vinh Magaña of a P2P offer.  JERROD sent information for P2P: [phone], opt. 3 by 12 noon 3/27. Ref # [id number] to RENATA Garcia.     
CM contacted Vinh at [facility] for update on prior auth . Vinh reports it is still pending.  DCP: [facility].  
CM following for discharge needs. CM reviewed clinical notes. Pulm and disciplines of PT,OT, SLP following.   Home CPAP. Refusing resp treatments.  Cardiology consideration for diagnostic LHC with CAD/stent history.  PT/OT recommending SNF. CM attempted to contact [facility] to follow up referral. LVOM. CM emailed Behtann at [facility] for referral follow up. Awaiting respons.    CM will follow up with spouse for additional SNF choices if [facility] declines.  DCP: SNF.  LOS 2D    1535 CM met with spouse. Request referral sent to [facility]. CM sent.  [employer] medicare will require prior authorization for placement.   1630 CM messaged [facility] declined.      
CM following for transition of care plan to [facility]. Prior auth pending today according to Vinh at [facility].  No new CM needs identified.  LOS 9D.     
CM met with patient's spouse after IDR.PT had written for home health on 3/18. Home situation has patient at home alone for the 6-8 hrs his wife is at work and his children are in school. Patient needs to be able to get himself to the bathroom during that time.   Therapy documented SNF to be the safe discharge.   CM informed by patient's spouse preferences for [facility]. CM sent referrals for placement.  CM updated Yady with [facility]/DCP.  
CM met with patient's spouse who is requesting SNF preferences be sent. PT/OT recommending SNF at discharge.   Preference for [facility].CM sent referral via fax to [phone].   CM provided list of [employer] network SNF. Pt recently change coverage. Other preferences were not in network with [employer].  
CM received bed offer from [facility]. CM discussed with patient and his spouse. Patient verbalizes agreement. CM accepted and selected bed. CM asked Vinh to initiate prior auth.    
CM reviewed clinical notes. S/P C with stent to RCA 3/16. Pulm- on room air. Continues Vancomycin with EOT 3/23. Home CPAP at night.  Neurosurgery consulted generalized weakness. MRI revealed focal area  with concern for possible glioma. MRI follow up in OP setting.   PT recommending home health from previous SNF last week.  CM will obtain preference for home health.    CM will continue to follow.      
Covid screen negative. Result placed in packet.  Spouse to transport by car to [facility], [address]. Report called by Lia primary nurse.   03/27/24 1101   Services At/After Discharge   Transition of Care Consult (CM Consult) SNF  ([facility] [address])   Services At/After Discharge Skilled Nursing Facility (SNF)    Resource Information Provided? No   Mode of Transport at Discharge Other (see comment)  (Car)   Confirm Follow Up Transport Family   Condition of Participation: Discharge Planning   The Plan for Transition of Care is related to the following treatment goals: SNF  ([facility])   The Patient and/or Patient Representative was provided with a Choice of Provider? Patient Representative   Name of the Patient Representative who was provided with the Choice of Provider and agrees with the Discharge Plan?  Spouse   The Patient and/Or Patient Representative agree with the Discharge Plan? Yes   Freedom of Choice list was provided with basic dialogue that supports the patient's individualized plan of care/goals, treatment preferences, and shares the quality data associated with the providers?  Yes       
JERROD informed by PA extender for [email or web address]iology that Nabil has not followed up for P2P.   CM sent email to  , Aleja Becker, to communicate our P2P situation.   1015 CM notified that prior auth approval has been given.  1020 CM informed patient of prior auth approval. Patient verbalizes agreement to discharge to [facility] today. Patient informed there is no Covid patient at [facility] and he will need screen prior to admission. Patient requests his spouse to transport him by car to [facility]. JERROD sent message to Ela, [phone], to see if transport is possible for her today.      
if so, who? Yes  (Spouse)   Financial Resources Medicare   Community Resources None   Social/Functional History   Lives With Spouse   Type of Home House   Receives Help From Family   ADL Assistance Needs assistance   Ambulation Assistance Needs assistance  (RW)   Transfer Assistance Independent   Active  No   Patient's  Info Spouse transports   Mode of Transportation Car   Discharge Planning   Type of Residence House   Living Arrangements Spouse/Significant Other;Children   Current Services Prior To Admission C-pap;Home Care   Potential Assistance Needed Home Care   DME Ordered? No   Potential Assistance Purchasing Medications No   Type of Home Care Services OT;PT;Nursing Services   Patient expects to be discharged to: House   Services At/After Discharge   Transition of Care Consult (CM Consult) Home Health   Internal Home Health No   Reason Outside Agency Chosen Patient already serviced by other home care/hospice agency  (Interim home health)   Services At/After Discharge Home Health;PT;OT;Nursing services    Resource Information Provided? No   Mode of Transport at Discharge Other (see comment)  (Car)   Confirm Follow Up Transport Family   Condition of Participation: Discharge Planning   The Plan for Transition of Care is related to the following treatment goals: Home with home health   The Patient and/or Patient Representative was provided with a Choice of Provider? Patient Representative   Name of the Patient Representative who was provided with the Choice of Provider and agrees with the Discharge Plan?  Spouse   The Patient and/Or Patient Representative agree with the Discharge Plan? Yes   Freedom of Choice list was provided with basic dialogue that supports the patient's individualized plan of care/goals, treatment preferences, and shares the quality data associated with the providers?  Yes

## 2024-03-27 NOTE — DISCHARGE INSTRUCTIONS
The patient is being discharged to [facility] in stable condition on a low saturated fat, low cholesterol and low salt diet. The patient is instructed to advance activities as tolerated to the limit of fatigue or shortness of breath as per facility PT/OT evaluations. The patient is instructed to watch the cath site for bleeding/oozing; if seen, the patient is instructed to apply firm pressure with a clean cloth and call [facility] [[email or web address] Cardiology at [phone]. The patient is instructed to watch for signs of infection which include: increasing area of redness, fever/hot to touch or purulent drainage at the catheterization site. The patient is cleared to shower. The patient is instructed to call the office or return to the ER for immediate evaluation for any shortness of breath or chest pain not relieved by NTG.

## 2024-03-27 NOTE — PLAN OF CARE
Problem: Discharge Planning  Goal: Discharge to home or other facility with appropriate resources  Outcome: Progressing  Flowsheets (Taken 3/27/2024 0818)  Discharge to home or other facility with appropriate resources:   Identify barriers to discharge with patient and caregiver   Arrange for needed discharge resources and transportation as appropriate   Identify discharge learning needs (meds, wound care, etc)   Arrange for interpreters to assist at discharge as needed   Refer to discharge planning if patient needs post-hospital services based on physician order or complex needs related to functional status, cognitive ability or social support system     Problem: Safety - Adult  Goal: Free from fall injury  Outcome: Progressing  Flowsheets (Taken 3/27/2024 0818)  Free From Fall Injury:   Instruct family/caregiver on patient safety   Based on caregiver fall risk screen, instruct family/caregiver to ask for assistance with transferring infant if caregiver noted to have fall risk factors     Problem: Skin/Tissue Integrity  Goal: Absence of new skin breakdown  Description: 1.  Monitor for areas of redness and/or skin breakdown  2.  Assess vascular access sites hourly  3.  Every 4-6 hours minimum:  Change oxygen saturation probe site  4.  Every 4-6 hours:  If on nasal continuous positive airway pressure, respiratory therapy assess nares and determine need for appliance change or resting period.  Outcome: Progressing     Problem: Pain  Goal: Verbalizes/displays adequate comfort level or baseline comfort level  Outcome: Progressing  Flowsheets (Taken 3/27/2024 0818)  Verbalizes/displays adequate comfort level or baseline comfort level:   Encourage patient to monitor pain and request assistance   Assess pain using appropriate pain scale   Administer analgesics based on type and severity of pain and evaluate response   Implement non-pharmacological measures as appropriate and evaluate response   Consider cultural and

## 2024-03-27 NOTE — TELEPHONE ENCOUNTER
Spoke with caregiver from [facility] in [place] to complete TC call, was told pt is not at the facility.    Call to Ela, pt's spouse, Ela says pt is still in the hospital waiting on pt's insurance company approval for placement.

## 2024-03-28 ENCOUNTER — TELEPHONE (OUTPATIENT)
Age: 59
End: 2024-03-28

## 2024-03-28 RX ORDER — EVOLOCUMAB 140 MG/ML
140 INJECTION, SOLUTION SUBCUTANEOUS
COMMUNITY
End: 2024-03-28

## 2024-03-28 NOTE — TELEPHONE ENCOUNTER
Spoke with pt's wife, Ela, she says pt was finally discharged yesterday afternoon from hospital to [facility].

## 2024-03-28 NOTE — TELEPHONE ENCOUNTER
Care Transitions Initial Follow Up Call    Call within 2 business days of discharge: Yes     Patient: Katarina Zuñiga Patient : 1965 MRN: [id number]        RARS: Readmission Risk Score: 4.9       Spoke with: Meena     Discharge department/facility:  [facility]    Non-face-to-face services provided:  Spoke with Meena, nurse at [facility] to verify pt would make his appointment with Dr. Wallace today at 11:30 AM, Meena verified he would. Meena confirmed pt's cath site is completely healed, no symptoms to report. Meena verifies they did receive pt's discharge instructions with updated medication list, I informed her that if Dr. Wallace makes any changes today I would let her know, she states understanding and gave me their fax number# [phone]    Follow Up  Future Appointments   Date Time Provider Department Center   3/28/2024 11:30 AM Javier Wallace MD [facility]       LENI PENNINGTON, RN

## 2024-03-28 NOTE — TELEPHONE ENCOUNTER
New medication order to discontinue atorvastatin and start Evolocumab 140 mg/ml SQ q 14 days faxed to [phone] at [facility], confirmation received

## 2024-03-28 NOTE — TELEPHONE ENCOUNTER
Pt wife states that the transport didn't pick the pt and they need to reschedule the TC appt. Talked to Kia, who will call the pts EC with an appt.

## 2024-03-28 NOTE — TELEPHONE ENCOUNTER
Ela, pt's wife, is currently here at the [facility] office waiting on pt to arrive for his 11:30 AM appointment with Dr. Wallace.      Of note, originally when the TC call was completed with Meena ([facility]), it was verified by her that pt would be here for appointment today, as well as pt's wife confirming this. Apparently, per Ela, staff from [facility] spoke with someone from [facility] and was told to bring him in for appointment, that he would be worked in.

## 2024-03-29 NOTE — TELEPHONE ENCOUNTER
Lvm letting Ela know pt does not need to see Dr. Wallace in [place] today after getting clarification from him.

## 2024-03-29 NOTE — TELEPHONE ENCOUNTER
Spoke with Meena letting her know pt did not need the appt in [place] today with Dr. Wallace after getting clarification from him. Also I let Meena know about the new medication Repatha being faxed to #[phone] at [facility], with confirmation. Meena also knows to stop atorvastatin.

## 2024-04-24 NOTE — PROGRESS NOTES
[facility] [address], [address]  [place] [address]  [phone]      Chief Complaint   Patient presents with    Follow-Up from Hospital     Progressive weakness  Abnormal MRI       Katarina Zuñiga is a 58 y.o. male who presents on for hospital follow up for abnormal MRI.    Admit date: 3/12/2024  Discharge Date: 3/25/2024    PMH significant for CAD s/p PCI pLAD 5/17/2013, last [facility] 2016 with mild nonobstructive coronary artery disease with patent stent, HTN, dyslipidemia, DM, COPD who presented to [facility] ED on 3/11 with complaint of generalized weakness for the past one month. States weakness progressively worse over the last two week.     Work up in ED at [facility]: WBC 10.4, H/H 12.8/40.3, Ptl 190, Na 143, K 4.3, BUN/Cr 22/0.96, trop 0.39- 0.71. CXR showed right lung base opacities, concerning for infection. CTA chest with bibasilar groundglass and nodular opacities. Left lower lobe consolidation. Bilateral mucus plugging. No acute pulmonary arterial thromboembolism. He was given Unasyn and 1L bolus in ED. Patient transferred to [facility] to cardiology service for elevated troponin.     Neurology was consulted for progressive weakness and sensorimotor deficits. MRI of brain w/wo contrast showed a nonenhancing lesion in the frontal parietal region. There is no diffusion restriction to suggest acute or subacute infarct. Neurology felt findings and progressive nature of symptoms were concerning for possible glioma. CT chest and CT of abdomen, pelvis were without  evidence of malignancy. No seizure ppx was recommended.  NS was consulted, MRI findings were possible low grade glioma, however was not candidate for intracranial biopsy at present time due to recent heart catheterization s/p cardiac stenting. Recommended repeat MRI w/wo contrast in 3 mths and follow up as OP.       He was evaluated by therapies and recommended for STR.     Interval history:    He is here

## 2024-04-25 ENCOUNTER — OFFICE VISIT (OUTPATIENT)
Dept: NEUROLOGY | Age: 59
End: 2024-04-25
Payer: MEDICARE

## 2024-04-25 VITALS
BODY MASS INDEX: 26.03 KG/M2 | HEART RATE: 80 BPM | HEIGHT: 66 IN | OXYGEN SATURATION: 92 % | WEIGHT: 162 LBS | DIASTOLIC BLOOD PRESSURE: 60 MMHG | SYSTOLIC BLOOD PRESSURE: 93 MMHG

## 2024-04-25 DIAGNOSIS — Z09 HOSPITAL DISCHARGE FOLLOW-UP: Primary | ICD-10-CM

## 2024-04-25 DIAGNOSIS — R93.89 ABNORMAL MRI: ICD-10-CM

## 2024-04-25 DIAGNOSIS — R53.1 WEAKNESS GENERALIZED: ICD-10-CM

## 2024-04-25 DIAGNOSIS — G93.9 LESION OF RIGHT FRONTAL LOBE OF BRAIN: ICD-10-CM

## 2024-04-25 PROCEDURE — 3078F DIAST BP <80 MM HG: CPT | Performed by: NURSE PRACTITIONER

## 2024-04-25 PROCEDURE — 1111F DSCHRG MED/CURRENT MED MERGE: CPT | Performed by: NURSE PRACTITIONER

## 2024-04-25 PROCEDURE — 3074F SYST BP LT 130 MM HG: CPT | Performed by: NURSE PRACTITIONER

## 2024-04-25 PROCEDURE — 99204 OFFICE O/P NEW MOD 45 MIN: CPT | Performed by: NURSE PRACTITIONER

## 2024-04-25 RX ORDER — GUAIFENESIN 600 MG/1
600 TABLET, EXTENDED RELEASE ORAL 2 TIMES DAILY PRN
COMMUNITY

## 2024-04-25 RX ORDER — LANOLIN ALCOHOL/MO/W.PET/CERES
1 CREAM (GRAM) TOPICAL
COMMUNITY
Start: 2022-09-09

## 2024-04-25 RX ORDER — LEVOTHYROXINE SODIUM 0.07 MG/1
TABLET ORAL
COMMUNITY
Start: 2024-02-15

## 2024-04-25 RX ORDER — PREDNISONE 20 MG/1
20 TABLET ORAL 2 TIMES DAILY
COMMUNITY
Start: 2024-02-14 | End: 2024-04-25

## 2024-04-25 RX ORDER — ALPRAZOLAM 1 MG/1
1 TABLET ORAL 2 TIMES DAILY PRN
COMMUNITY
Start: 2024-04-10

## 2024-04-25 RX ORDER — LANCETS 33 GAUGE
EACH MISCELLANEOUS
COMMUNITY
Start: 2024-04-24

## 2024-04-25 RX ORDER — OMEPRAZOLE 10 MG/1
10 CAPSULE, DELAYED RELEASE ORAL 2 TIMES DAILY
COMMUNITY
Start: 2024-02-06

## 2024-04-25 RX ORDER — DULOXETIN HYDROCHLORIDE 60 MG/1
60 CAPSULE, DELAYED RELEASE ORAL DAILY
COMMUNITY
End: 2024-04-25

## 2024-04-25 RX ORDER — DICYCLOMINE HCL 20 MG
TABLET ORAL
COMMUNITY
Start: 2024-03-10

## 2024-04-25 RX ORDER — PANTOPRAZOLE SODIUM 20 MG/1
TABLET, DELAYED RELEASE ORAL
COMMUNITY
Start: 2024-02-06

## 2024-04-25 RX ORDER — AMOXICILLIN AND CLAVULANATE POTASSIUM 875; 125 MG/1; MG/1
1 TABLET, FILM COATED ORAL 2 TIMES DAILY
COMMUNITY
Start: 2024-01-18 | End: 2024-04-25

## 2024-04-25 RX ORDER — HYDROCODONE BITARTRATE AND ACETAMINOPHEN 10; 325 MG/1; MG/1
1 TABLET ORAL 4 TIMES DAILY
COMMUNITY
Start: 2024-02-11

## 2024-04-25 RX ORDER — UBIDECARENONE 200 MG
200 CAPSULE ORAL DAILY
COMMUNITY
End: 2024-04-25

## 2024-04-25 RX ORDER — BISACODYL 10 MG
10 SUPPOSITORY, RECTAL RECTAL DAILY PRN
COMMUNITY
Start: 2023-06-19

## 2024-04-25 RX ORDER — NITROFURANTOIN 25; 75 MG/1; MG/1
100 CAPSULE ORAL 2 TIMES DAILY
COMMUNITY
Start: 2024-04-17 | End: 2024-05-02

## 2024-04-25 RX ORDER — BLOOD SUGAR DIAGNOSTIC
STRIP MISCELLANEOUS
COMMUNITY
Start: 2024-04-24

## 2024-04-25 RX ORDER — HYDROXYZINE PAMOATE 25 MG/1
1 CAPSULE ORAL NIGHTLY PRN
COMMUNITY
Start: 2019-08-25 | End: 2024-04-25

## 2024-04-25 RX ORDER — DESVENLAFAXINE SUCCINATE 50 MG/1
50 TABLET, EXTENDED RELEASE ORAL DAILY
COMMUNITY
End: 2024-04-25

## 2024-04-25 RX ORDER — METHYLPREDNISOLONE 4 MG/1
TABLET ORAL SEE ADMIN INSTRUCTIONS
COMMUNITY
Start: 2019-04-30

## 2024-04-25 RX ORDER — CELECOXIB 200 MG/1
CAPSULE ORAL
COMMUNITY
Start: 2024-03-05

## 2024-04-25 RX ORDER — DIVALPROEX SODIUM 500 MG/1
TABLET, EXTENDED RELEASE ORAL
COMMUNITY
Start: 2024-04-10

## 2024-04-25 RX ORDER — TOPIRAMATE 50 MG/1
50 TABLET, FILM COATED ORAL 2 TIMES DAILY PRN
COMMUNITY
Start: 2024-01-17

## 2024-04-25 RX ORDER — DESVENLAFAXINE 100 MG/1
100 TABLET, EXTENDED RELEASE ORAL DAILY
COMMUNITY
Start: 2019-08-25 | End: 2024-04-25

## 2024-04-25 RX ORDER — OLANZAPINE 10 MG/1
10 TABLET ORAL
COMMUNITY
Start: 2024-04-10

## 2024-04-25 RX ORDER — ACETAMINOPHEN 325 MG/1
650 TABLET ORAL
COMMUNITY
Start: 2023-02-27

## 2024-04-25 RX ORDER — CIPROFLOXACIN 500 MG/1
500 TABLET, FILM COATED ORAL 2 TIMES DAILY
COMMUNITY
Start: 2024-02-15 | End: 2024-02-25

## 2024-04-25 ASSESSMENT — PATIENT HEALTH QUESTIONNAIRE - PHQ9
SUM OF ALL RESPONSES TO PHQ QUESTIONS 1-9: 0
SUM OF ALL RESPONSES TO PHQ QUESTIONS 1-9: 0
1. LITTLE INTEREST OR PLEASURE IN DOING THINGS: NOT AT ALL
SUM OF ALL RESPONSES TO PHQ9 QUESTIONS 1 & 2: 0
2. FEELING DOWN, DEPRESSED OR HOPELESS: NOT AT ALL
SUM OF ALL RESPONSES TO PHQ QUESTIONS 1-9: 0
SUM OF ALL RESPONSES TO PHQ QUESTIONS 1-9: 0

## 2024-04-25 ASSESSMENT — ENCOUNTER SYMPTOMS
ALLERGIC/IMMUNOLOGIC NEGATIVE: 1
RESPIRATORY NEGATIVE: 1
NAUSEA: 1
EYES NEGATIVE: 1

## 2024-04-30 LAB
ACID FAST STN SPEC: NEGATIVE
MYCOBACTERIUM SPEC QL CULT: NEGATIVE
SPECIMEN PREPARATION: NORMAL
SPECIMEN SOURCE: NORMAL

## 2024-05-17 ENCOUNTER — OFFICE VISIT (OUTPATIENT)
Age: 59
End: 2024-05-17
Payer: MEDICARE

## 2024-05-17 VITALS
SYSTOLIC BLOOD PRESSURE: 92 MMHG | HEIGHT: 66 IN | HEART RATE: 84 BPM | DIASTOLIC BLOOD PRESSURE: 62 MMHG | BODY MASS INDEX: 24.91 KG/M2 | WEIGHT: 155 LBS

## 2024-05-17 DIAGNOSIS — E78.2 MIXED HYPERLIPIDEMIA: Primary | ICD-10-CM

## 2024-05-17 DIAGNOSIS — I50.22 CHRONIC SYSTOLIC HEART FAILURE (HCC): ICD-10-CM

## 2024-05-17 DIAGNOSIS — I25.10 ATHEROSCLEROSIS OF NATIVE CORONARY ARTERY OF NATIVE HEART WITHOUT ANGINA PECTORIS: ICD-10-CM

## 2024-05-17 DIAGNOSIS — E11.21 TYPE 2 DIABETES MELLITUS WITH DIABETIC NEPHROPATHY, UNSPECIFIED WHETHER LONG TERM INSULIN USE (HCC): Chronic | ICD-10-CM

## 2024-05-17 PROCEDURE — 3044F HG A1C LEVEL LT 7.0%: CPT | Performed by: INTERNAL MEDICINE

## 2024-05-17 PROCEDURE — 3078F DIAST BP <80 MM HG: CPT | Performed by: INTERNAL MEDICINE

## 2024-05-17 PROCEDURE — 3074F SYST BP LT 130 MM HG: CPT | Performed by: INTERNAL MEDICINE

## 2024-05-17 PROCEDURE — 99215 OFFICE O/P EST HI 40 MIN: CPT | Performed by: INTERNAL MEDICINE

## 2024-05-17 RX ORDER — CLOPIDOGREL BISULFATE 75 MG/1
75 TABLET ORAL DAILY
Qty: 30 TABLET | Refills: 3 | Status: SHIPPED | OUTPATIENT
Start: 2024-05-17 | End: 2024-05-17

## 2024-05-17 RX ORDER — CLOPIDOGREL BISULFATE 75 MG/1
75 TABLET ORAL DAILY
Qty: 90 TABLET | Refills: 3 | Status: SHIPPED | OUTPATIENT
Start: 2024-05-17

## 2024-05-17 NOTE — PROGRESS NOTES
AS NEEDED FOR THRUSH, Disp: , Rfl:     celecoxib (CELEBREX) 200 MG capsule, TAKE 1 CAPSULE (200 MG) BY MOUTH DAILY INDICATIONS: JOINT DAMAGE CAUSING PAIN AND LOSS OF FUNCTION, Disp: , Rfl:     dicyclomine (BENTYL) 20 MG tablet, , Disp: , Rfl:     divalproex (DEPAKOTE ER) 500 MG extended release tablet, TAKE 2 TABLETS BY MOUTH EVERY DAY AT BEDTIME, Disp: , Rfl:     levothyroxine (SYNTHROID) 75 MCG tablet, TAKE 1 TABLET BY MOUTH EVERY DAY FOR UNDERACTIVE THYROID, Disp: , Rfl:     OLANZapine (ZYPREXA) 10 MG tablet, Take 1 tablet by mouth nightly, Disp: , Rfl:     pantoprazole (PROTONIX) 20 MG tablet, TAKE 1 TABLET (20 MG) BY MOUTH 2 (TWO) TIMES A DAY INDICATIONS: GASTROESOPHAGEAL REFLUX DISEASE, Disp: , Rfl:     topiramate (TOPAMAX) 50 MG tablet, Take 1 tablet by mouth 2 times daily as needed, Disp: , Rfl:     Evolocumab 140 MG/ML SOAJ, Inject 140 mg into the skin every 14 days, Disp: 2 mL, Rfl: 11    ticagrelor (BRILINTA) 90 MG TABS tablet, Take 1 tablet by mouth 2 times daily, Disp: 60 tablet, Rfl: 11    nitroGLYCERIN (NITROSTAT) 0.4 MG SL tablet, Place 1 sl under the tongue q 5 min prn cp, max 3 sl in a 15-min time period. Call 911 if no relief after the 3rd sl., Disp: 25 tablet, Rfl: 3    naloxone (NARCAN) 4 MG/0.1ML LIQD nasal spray, 1 spray by Nasal route as needed for Opioid Reversal, Disp: 1 each, Rfl: 1    aspirin 81 MG EC tablet, Take 1 tablet by mouth daily, Disp: , Rfl:     cetirizine (ZYRTEC) 10 MG tablet, Take 1 tablet by mouth daily as needed, Disp: , Rfl:     docusate sodium (COLACE) 50 MG capsule, Take 1 capsule by mouth, Disp: , Rfl:     fluticasone-vilanterol (BREO ELLIPTA) 100-25 MCG/INH AEPB inhaler, Inhale 1 puff into the lungs every other day, Disp: , Rfl:     fluticasone (FLONASE) 50 MCG/ACT nasal spray, 2 sprays by Nasal route daily as needed, Disp: , Rfl:     furosemide (LASIX) 20 MG tablet, Take 1 tablet by mouth daily, Disp: , Rfl:     ipratropium-albuterol (DUONEB) 0.5-2.5 (3) MG/3ML SOLN

## 2024-05-21 ENCOUNTER — OFFICE VISIT (OUTPATIENT)
Dept: NEUROSURGERY | Age: 59
End: 2024-05-21
Payer: MEDICARE

## 2024-05-21 VITALS
OXYGEN SATURATION: 92 % | BODY MASS INDEX: 25.02 KG/M2 | HEART RATE: 100 BPM | TEMPERATURE: 97.5 F | SYSTOLIC BLOOD PRESSURE: 91 MMHG | DIASTOLIC BLOOD PRESSURE: 65 MMHG | HEIGHT: 66 IN

## 2024-05-21 DIAGNOSIS — R90.89 ABNORMAL BRAIN MRI: Primary | ICD-10-CM

## 2024-05-21 PROCEDURE — 3078F DIAST BP <80 MM HG: CPT | Performed by: NEUROLOGICAL SURGERY

## 2024-05-21 PROCEDURE — 99205 OFFICE O/P NEW HI 60 MIN: CPT | Performed by: NEUROLOGICAL SURGERY

## 2024-05-21 PROCEDURE — 3074F SYST BP LT 130 MM HG: CPT | Performed by: NEUROLOGICAL SURGERY

## 2024-05-21 ASSESSMENT — ENCOUNTER SYMPTOMS
EYES NEGATIVE: 1
ALLERGIC/IMMUNOLOGIC NEGATIVE: 1
CONSTIPATION: 1
BACK PAIN: 1
RESPIRATORY NEGATIVE: 1

## 2024-05-21 NOTE — PROGRESS NOTES
heart failure (HCC)     EF 60% 6/2016    Coronary atherosclerosis of native coronary vessel 4/29/2016    Depression     managed with medication     Extradural cyst of spine     at the base of neck    GERD (gastroesophageal reflux disease)     managed with medication     Heart failure (HCC)     managed with medication     History of rectal bleeding     due to Plavix, discontinued with no further difficulty    Hyperlipidemia     no present treatment     Hypertension     managed with medication     MRSA (methicillin resistant staph aureus) culture positive 4/18/2016    Old MI (myocardial infarction)     x 1    PTSD (post-traumatic stress disorder)     Right BB block     Stroke (HCC) 1991    TIA, left side    Thyroid disease     Hypoactive    Unspecified sleep apnea     CPAP compliant      Past Surgical History:   Procedure Laterality Date    CARDIAC CATHETERIZATION  06/09/2016    normal    CARDIAC PROCEDURE N/A 3/16/2024    Left heart cath / coronary angiography performed by Javier Wallace MD at [facility] CARDIAC CATH LAB    CARDIAC PROCEDURE N/A 3/16/2024    Percutaneous coronary intervention performed by Javier Wallace MD at [facility] CARDIAC CATH LAB    COLONOSCOPY      HERNIA REPAIR      LEFT HEART CATH,PERCUTANEOUS  5/16/2013    stent x1 to LAD, 2 caths in  7/2013    ORTHOPEDIC SURGERY Right     Right hip replacement    SHOULDER ARTHROSCOPY Left     SINUS SURGERY PROC UNLISTED       x3    UPPER GASTROINTESTINAL ENDOSCOPY        Current Outpatient Medications   Medication Sig Dispense Refill    clopidogrel (PLAVIX) 75 MG tablet Take 1 tablet by mouth daily 90 tablet 3    acetaminophen (TYLENOL) 325 MG tablet Take 2 tablets by mouth      ALPRAZolam (XANAX) 1 MG tablet Take 1 tablet by mouth 2 times daily as needed.      bisacodyl (DULCOLAX) 10 MG suppository Place 1 suppository rectally daily as needed      diclofenac sodium (VOLTAREN) 1 % GEL Apply 1 Application topically      diphenhydrAMINE (BENYLIN) 12.5 MG/5ML liquid

## 2024-05-21 NOTE — ASSESSMENT & PLAN NOTE
has a very complicated medical history and has multiple very small enhancing lesions within his brain.  I really do not see anything that is either safe to biopsy or actually large enough that we would have a high enough yield to put him through the procedure.  When I first looked at the images I was thinking more likely that this was metastatic in nature but with his complicated infection history I am thinking this might be infectious in nature.he has another MRI set up within the next week or 2 and I think we should hold off on any decisions of our neck step until after that MRI.  I will see them back and we can discuss through various options.  If the lesions are still present I think the neck step would be to obtain spinal fluid.  I do not see any mass effect or compromise of the cisterns and I think an LP would be a safe way to obtain that.

## 2024-06-05 ENCOUNTER — TELEPHONE (OUTPATIENT)
Age: 59
End: 2024-06-05

## 2024-06-05 NOTE — TELEPHONE ENCOUNTER
Ela pt.wife says pt.is at [facility] for pneumonia.Was told heart paused for 5 seconds last night.He was told he needs a pacemaker and they want to put it in there.In talking to them would rather [facility] to review and advise.    I told the wife if he is currently in the hospital and and they feel pacemaker needed urgently to proceed and they can f/u after w/[facility].They can also request transfer of care.  Wife thanked me for my help.

## 2024-06-05 NOTE — TELEPHONE ENCOUNTER
Pt wife lEa states that pt is in [facility] with pneumonia and was told that there possibly needed to be a pacemaker placed but wants [facility] to review the results and advice how to proceeded with care. Ela would appreciate a call back.

## 2024-06-18 ENCOUNTER — OFFICE VISIT (OUTPATIENT)
Dept: NEUROSURGERY | Age: 59
End: 2024-06-18
Payer: MEDICARE

## 2024-06-18 VITALS
SYSTOLIC BLOOD PRESSURE: 98 MMHG | BODY MASS INDEX: 25.02 KG/M2 | OXYGEN SATURATION: 94 % | TEMPERATURE: 97.2 F | DIASTOLIC BLOOD PRESSURE: 64 MMHG | HEIGHT: 66 IN | HEART RATE: 82 BPM

## 2024-06-18 DIAGNOSIS — R90.89 ABNORMAL BRAIN MRI: Primary | ICD-10-CM

## 2024-06-18 PROCEDURE — 3078F DIAST BP <80 MM HG: CPT | Performed by: NEUROLOGICAL SURGERY

## 2024-06-18 PROCEDURE — 3074F SYST BP LT 130 MM HG: CPT | Performed by: NEUROLOGICAL SURGERY

## 2024-06-18 PROCEDURE — 99213 OFFICE O/P EST LOW 20 MIN: CPT | Performed by: NEUROLOGICAL SURGERY

## 2024-06-18 RX ORDER — PREDNISONE 20 MG/1
TABLET ORAL
COMMUNITY
Start: 2024-06-13

## 2024-06-18 RX ORDER — FERROUS SULFATE 325(65) MG
TABLET ORAL
COMMUNITY
Start: 2024-06-07

## 2024-06-18 ASSESSMENT — ENCOUNTER SYMPTOMS
GASTROINTESTINAL NEGATIVE: 1
ALLERGIC/IMMUNOLOGIC NEGATIVE: 1
BACK PAIN: 1
SHORTNESS OF BREATH: 1
EYES NEGATIVE: 1

## 2024-06-18 NOTE — ASSESSMENT & PLAN NOTE
Overall significant improvement in the small enhancing lesion seen before.  We will repeat the MRI in 3 months unless he should have any new symptomatology at which time we will repeat sooner.

## 2024-06-18 NOTE — PROGRESS NOTES
[facility] OFFICE NOTE:         CC: Routine follow-up for  review of most recent MRI    History of Present Illness:  Katarina Zuñiga (1965) is a 58 y.o.  male who was last seen in the office 5/21/2024  for multiple brain lesions.  They return to the office today for  review of his most recent repeat MRI .  Since his last in the office he has been admitted for yet another infection.  He states otherwise he is about the same.    HPI      Past Medical History:   Diagnosis Date    Abnormal liver function     Acute myocardial infarction (HCC) 4/29/2016    Anxiety     managed with medication    Arthritis     generalized    Asthma     managed with inhalers and jet neb    Autoimmune disease (HCC)     Fibromyalgia    AVN of femur (HCC) 4/18/2016    Bulging lumbar disc     x3 states patient     CAD (coronary artery disease) 5/16/2013    STEMI-1 stent    Chest pain 4/29/2016    Chronic anxiety 4/29/2016    Chronic obstructive pulmonary disease (HCC)      Chronic Bronchitis    Chronic pain     generalized , See Dr. Berg    Chronic systolic heart failure ([facility]) 4/29/2016    Claustrophobia     mild    Congestive heart failure ([facility])     EF 60% 6/2016    Coronary atherosclerosis of native coronary vessel 4/29/2016    Depression     managed with medication     Extradural cyst of spine     at the base of neck    GERD (gastroesophageal reflux disease)     managed with medication     Heart failure (HCC)     managed with medication     History of rectal bleeding     due to Plavix, discontinued with no further difficulty    Hyperlipidemia     no present treatment     Hypertension     managed with medication     MRSA (methicillin resistant staph aureus) culture positive 4/18/2016    Old MI (myocardial infarction)     x 1    PTSD (post-traumatic stress disorder)     Right BB block     Stroke (HCC) 1991    TIA, left side    Thyroid disease     Hypoactive    Unspecified sleep apnea     CPAP

## 2024-11-05 ENCOUNTER — OFFICE VISIT (OUTPATIENT)
Age: 59
End: 2024-11-05
Payer: MEDICARE

## 2024-11-05 VITALS
WEIGHT: 165 LBS | HEIGHT: 66 IN | HEART RATE: 68 BPM | DIASTOLIC BLOOD PRESSURE: 76 MMHG | BODY MASS INDEX: 26.52 KG/M2 | SYSTOLIC BLOOD PRESSURE: 123 MMHG

## 2024-11-05 DIAGNOSIS — E11.21 TYPE 2 DIABETES MELLITUS WITH DIABETIC NEPHROPATHY, UNSPECIFIED WHETHER LONG TERM INSULIN USE (HCC): ICD-10-CM

## 2024-11-05 DIAGNOSIS — I25.10 ATHEROSCLEROSIS OF NATIVE CORONARY ARTERY OF NATIVE HEART WITHOUT ANGINA PECTORIS: ICD-10-CM

## 2024-11-05 DIAGNOSIS — I50.22 CHRONIC SYSTOLIC HEART FAILURE (HCC): ICD-10-CM

## 2024-11-05 DIAGNOSIS — E78.2 MIXED HYPERLIPIDEMIA: Primary | ICD-10-CM

## 2024-11-05 PROCEDURE — 99214 OFFICE O/P EST MOD 30 MIN: CPT | Performed by: INTERNAL MEDICINE

## 2024-11-05 PROCEDURE — 3074F SYST BP LT 130 MM HG: CPT | Performed by: INTERNAL MEDICINE

## 2024-11-05 PROCEDURE — 3078F DIAST BP <80 MM HG: CPT | Performed by: INTERNAL MEDICINE

## 2024-11-05 PROCEDURE — 3044F HG A1C LEVEL LT 7.0%: CPT | Performed by: INTERNAL MEDICINE

## 2024-11-05 RX ORDER — MIDODRINE HYDROCHLORIDE 5 MG/1
5 TABLET ORAL
COMMUNITY
Start: 2024-10-09

## 2024-11-05 RX ORDER — NITROFURANTOIN 25; 75 MG/1; MG/1
100 CAPSULE ORAL 2 TIMES DAILY
COMMUNITY

## 2024-11-05 NOTE — PROGRESS NOTES
Disp: 25 tablet, Rfl: 3    naloxone (NARCAN) 4 MG/0.1ML LIQD nasal spray, 1 spray by Nasal route as needed for Opioid Reversal, Disp: 1 each, Rfl: 1    aspirin 81 MG EC tablet, Take 1 tablet by mouth daily, Disp: , Rfl:     cetirizine (ZYRTEC) 10 MG tablet, Take 1 tablet by mouth daily as needed, Disp: , Rfl:     docusate sodium (COLACE) 50 MG capsule, Take 1 capsule by mouth, Disp: , Rfl:     fluticasone-vilanterol (BREO ELLIPTA) 100-25 MCG/INH AEPB inhaler, Inhale 1 puff into the lungs every other day, Disp: , Rfl:     fluticasone (FLONASE) 50 MCG/ACT nasal spray, 2 sprays by Nasal route daily as needed, Disp: , Rfl:     furosemide (LASIX) 20 MG tablet, Take 1 tablet by mouth daily, Disp: , Rfl:     ipratropium-albuterol (DUONEB) 0.5-2.5 (3) MG/3ML SOLN nebulizer solution, Inhale 3 mLs into the lungs every 6 hours as needed, Disp: , Rfl:     levalbuterol (XOPENEX HFA) 45 MCG/ACT inhaler, Inhale 2 puffs into the lungs every 4 hours as needed, Disp: , Rfl:     BRIA SEYMOUR  11/5/2024  11:51 AM    Appropriate evaluation of guideline directed medical therapy for the patient's conditions have been reviewed.  If appropriate, adjustment of therapy for underlying cardiac issues has been offered.  If patient wishes for adjustment of therapy which would include intensification of pharmacologic therapy for any above conditions this will be sent to appropriate pharmacy.  If patient politely declines any further changes they will be encouraged to continue with current treatment and appropriate risk factor modification and healthy lifestyle.      Pt is instructed to follow all appropriate cardiac risk factor recommendations and to be compliant with meds and testing. Instructed to follow up appropriately and seek urgent medical care if acute or unstable issues arise. Results of some tests may be viewed thru MyChart but this does not substitute for follow up with MD. If follow up is not scheduled pt is instructed to call for

## (undated) DEVICE — CATHETER DIAG AD 5FR L110CM 145DEG COR GRY HYDRPHLC NYL

## (undated) DEVICE — KENDALL SCD EXPRESS SLEEVES, KNEE LENGTH, MEDIUM: Brand: KENDALL SCD

## (undated) DEVICE — 3000CC GUARDIAN II: Brand: GUARDIAN

## (undated) DEVICE — IMMOBILIZER SHOULDER SLING SWATHE DLX

## (undated) DEVICE — SYR 50ML LR LCK 1ML GRAD NSAF --

## (undated) DEVICE — FAN SPRAY KIT: Brand: PULSAVAC®

## (undated) DEVICE — STOCKINETTE TUBE 9X48 -- MEDICHOICE

## (undated) DEVICE — GLIDESHEATH SLENDER STAINLESS STEEL KIT: Brand: GLIDESHEATH SLENDER

## (undated) DEVICE — DRAPE,SHOULDER,BEACH CHAIR,STERILE: Brand: MEDLINE

## (undated) DEVICE — INTENDED FOR TISSUE SEPARATION, AND OTHER PROCEDURES THAT REQUIRE A SHARP SURGICAL BLADE TO PUNCTURE OR CUT.: Brand: BARD-PARKER SAFETY BLADES SIZE 15, STERILE

## (undated) DEVICE — RESTRAINT UNIVERSAL HEAD DISP --

## (undated) DEVICE — SUTURE VCRL SZ 1 L27IN ABSRB UD L36MM CP-1 1/2 CIR REV CUT J268H

## (undated) DEVICE — 3M™ IOBAN™ 2 ANTIMICROBIAL INCISE DRAPE 6650EZ: Brand: IOBAN™ 2

## (undated) DEVICE — CATHETER COR DIAG 4.0 5FR 110CM 2 SIDE H

## (undated) DEVICE — CATH BLLN ANGIO 2.75X27MM NC EUPHORIA RX

## (undated) DEVICE — 2108 SERIES SAGITTAL BLADE, GROUND (18.5 X 1.32 X 86.0MM)

## (undated) DEVICE — 3M™ COBAN™ STERILE SELF-ADHERENT WRAP, 1584S, 4 IN X 5 YD (10 CM X 4,5 M), 18 ROLLS/CASE: Brand: 3M™ COBAN™

## (undated) DEVICE — SUTURE VCRL SZ 2-0 L27IN ABSRB UD L26MM CT-2 1/2 CIR J269H

## (undated) DEVICE — BUTTON SWITCH PENCIL BLADE ELECTRODE, HOLSTER: Brand: EDGE

## (undated) DEVICE — ABDOMINAL PAD: Brand: DERMACEA

## (undated) DEVICE — SUTURE VCRL SZ 0 L27IN ABSRB UD L36MM CP-1 1/2 CIR REV CUT J267H

## (undated) DEVICE — SUTURE VCRL SZ 2-0 L27IN ABSRB UD L36MM CP-1 1/2 CIR REV J266H

## (undated) DEVICE — (D)PREP SKN CHLRAPRP APPL 26ML -- CONVERT TO ITEM 371833

## (undated) DEVICE — SUTURE FIBERWIRE SZ 2 W/ TAPERED NEEDLE BLUE L38IN NONABSORB BLU L26.5MM 1/2 CIRCLE AR7200

## (undated) DEVICE — COVER,TABLE,HEAVY DUTY,79"X110",STRL: Brand: MEDLINE

## (undated) DEVICE — STRYKER PERFORMANCE SERIES SAGITTAL BLADE: Brand: STRYKER PERFORMANCE SERIES

## (undated) DEVICE — INTENDED FOR TISSUE SEPARATION, AND OTHER PROCEDURES THAT REQUIRE A SHARP SURGICAL BLADE TO PUNCTURE OR CUT.: Brand: BARD-PARKER ® STAINLESS STEEL BLADES

## (undated) DEVICE — DRAPE TBL W72XH34IN D30IN SGL PC DISPOSABLE

## (undated) DEVICE — T4 HOOD

## (undated) DEVICE — REM POLYHESIVE ADULT PATIENT RETURN ELECTRODE: Brand: VALLEYLAB

## (undated) DEVICE — CATHETER GUID 6FR L100CM GRN PTFE JR4 TRUELUMEN HYBRID

## (undated) DEVICE — GUIDEWIRE WITH ICE™ HYDROPHILIC COATING: Brand: CHOICE™ PT

## (undated) DEVICE — BAND COMPR L24CM REG CLR PLAS HEMSTAT EXT HK AND LOOP RETEN

## (undated) DEVICE — SOLUTION IRRIG 3000ML 0.9% SOD CHL FLX CONT 0797208] ICU MEDICAL INC]

## (undated) DEVICE — SPONGE LAP 18X18IN STRL -- 5/PK

## (undated) DEVICE — CATH BLLN ANGIO 2.50X15MM SC EUPHORA RX

## (undated) DEVICE — SOLUTION IV 1000ML 0.9% SOD CHL

## (undated) DEVICE — GUIDEWIRE 035IN 210CM PTFE COAT FIX COR J TIP 15MM FIRM BODY

## (undated) DEVICE — SURGICAL PROCEDURE PACK BASIC ST FRANCIS

## (undated) DEVICE — SLIM BODY SKIN STAPLER: Brand: APPOSE ULC

## (undated) DEVICE — NEEDLE SUT 1/2 CIR TAPR TIP MAYO NONSTERILE SZ 5